# Patient Record
Sex: FEMALE | Race: BLACK OR AFRICAN AMERICAN | NOT HISPANIC OR LATINO | URBAN - METROPOLITAN AREA
[De-identification: names, ages, dates, MRNs, and addresses within clinical notes are randomized per-mention and may not be internally consistent; named-entity substitution may affect disease eponyms.]

---

## 2019-09-26 ENCOUNTER — OFFICE (OUTPATIENT)
Dept: URBAN - METROPOLITAN AREA CLINIC 64 | Facility: CLINIC | Age: 69
End: 2019-09-26

## 2019-09-26 VITALS
HEIGHT: 66 IN | DIASTOLIC BLOOD PRESSURE: 65 MMHG | SYSTOLIC BLOOD PRESSURE: 137 MMHG | HEART RATE: 81 BPM | WEIGHT: 293 LBS

## 2019-09-26 DIAGNOSIS — K59.09 OTHER CONSTIPATION: ICD-10-CM

## 2019-09-26 DIAGNOSIS — K80.20 CALCULUS OF GALLBLADDER WITHOUT CHOLECYSTITIS WITHOUT OBSTRU: ICD-10-CM

## 2019-09-26 DIAGNOSIS — K21.9 GASTRO-ESOPHAGEAL REFLUX DISEASE WITHOUT ESOPHAGITIS: ICD-10-CM

## 2019-09-26 DIAGNOSIS — K75.81 NONALCOHOLIC STEATOHEPATITIS (NASH): ICD-10-CM

## 2019-09-26 DIAGNOSIS — R13.19 OTHER DYSPHAGIA: ICD-10-CM

## 2019-09-26 DIAGNOSIS — R94.5 ABNORMAL RESULTS OF LIVER FUNCTION STUDIES: ICD-10-CM

## 2019-09-26 DIAGNOSIS — R10.13 EPIGASTRIC PAIN: ICD-10-CM

## 2019-09-26 DIAGNOSIS — Z12.11 ENCOUNTER FOR SCREENING FOR MALIGNANT NEOPLASM OF COLON: ICD-10-CM

## 2019-09-26 PROCEDURE — 99243 OFF/OP CNSLTJ NEW/EST LOW 30: CPT | Performed by: INTERNAL MEDICINE

## 2019-09-26 RX ORDER — OMEPRAZOLE 40 MG/1
40 CAPSULE, DELAYED RELEASE ORAL
Qty: 90 | Refills: 0 | Status: ACTIVE
Start: 2019-09-26

## 2019-09-26 RX ORDER — URSODIOL 500 MG/1
TABLET, FILM COATED ORAL
Qty: 60 | Refills: 10 | Status: COMPLETED
Start: 2019-09-26 | End: 2023-02-21 | Stop reason: CLARIF

## 2019-09-26 RX ORDER — LINACLOTIDE 145 UG/1
145 CAPSULE, GELATIN COATED ORAL
Qty: 90 | Refills: 3 | Status: COMPLETED
Start: 2019-09-26 | End: 2023-02-21

## 2019-10-03 ENCOUNTER — ON CAMPUS - OUTPATIENT (OUTPATIENT)
Dept: URBAN - METROPOLITAN AREA HOSPITAL 77 | Facility: HOSPITAL | Age: 69
End: 2019-10-03

## 2019-10-03 DIAGNOSIS — R74.8 ABNORMAL LEVELS OF OTHER SERUM ENZYMES: ICD-10-CM

## 2019-10-03 DIAGNOSIS — K76.89 OTHER SPECIFIED DISEASES OF LIVER: ICD-10-CM

## 2019-10-03 DIAGNOSIS — K76.0 FATTY (CHANGE OF) LIVER, NOT ELSEWHERE CLASSIFIED: ICD-10-CM

## 2019-10-03 PROCEDURE — 43242 EGD US FINE NEEDLE BX/ASPIR: CPT | Performed by: INTERNAL MEDICINE

## 2019-10-25 ENCOUNTER — OFFICE (OUTPATIENT)
Dept: URBAN - METROPOLITAN AREA CLINIC 64 | Facility: CLINIC | Age: 69
End: 2019-10-25

## 2019-10-25 VITALS
DIASTOLIC BLOOD PRESSURE: 72 MMHG | HEIGHT: 66 IN | SYSTOLIC BLOOD PRESSURE: 121 MMHG | WEIGHT: 293 LBS | HEART RATE: 75 BPM

## 2019-10-25 DIAGNOSIS — K59.09 OTHER CONSTIPATION: ICD-10-CM

## 2019-10-25 DIAGNOSIS — K21.9 GASTRO-ESOPHAGEAL REFLUX DISEASE WITHOUT ESOPHAGITIS: ICD-10-CM

## 2019-10-25 DIAGNOSIS — K75.81 NONALCOHOLIC STEATOHEPATITIS (NASH): ICD-10-CM

## 2019-10-25 DIAGNOSIS — R10.13 EPIGASTRIC PAIN: ICD-10-CM

## 2019-10-25 DIAGNOSIS — R13.19 OTHER DYSPHAGIA: ICD-10-CM

## 2019-10-25 DIAGNOSIS — R94.5 ABNORMAL RESULTS OF LIVER FUNCTION STUDIES: ICD-10-CM

## 2019-10-25 DIAGNOSIS — Z46.51 ENCOUNTER FOR FITTING AND ADJUSTMENT OF GASTRIC LAP BAND: ICD-10-CM

## 2019-10-25 DIAGNOSIS — K80.20 CALCULUS OF GALLBLADDER WITHOUT CHOLECYSTITIS WITHOUT OBSTRU: ICD-10-CM

## 2019-10-25 PROCEDURE — 99213 OFFICE O/P EST LOW 20 MIN: CPT | Performed by: INTERNAL MEDICINE

## 2019-12-17 ENCOUNTER — ON CAMPUS - OUTPATIENT (OUTPATIENT)
Dept: URBAN - METROPOLITAN AREA HOSPITAL 77 | Facility: HOSPITAL | Age: 69
End: 2019-12-17
Payer: COMMERCIAL

## 2019-12-17 DIAGNOSIS — Z12.11 ENCOUNTER FOR SCREENING FOR MALIGNANT NEOPLASM OF COLON: ICD-10-CM

## 2019-12-17 DIAGNOSIS — R13.10 DYSPHAGIA, UNSPECIFIED: ICD-10-CM

## 2019-12-17 PROCEDURE — 43450 DILATE ESOPHAGUS 1/MULT PASS: CPT | Performed by: INTERNAL MEDICINE

## 2019-12-17 PROCEDURE — 45378 DIAGNOSTIC COLONOSCOPY: CPT | Mod: 33 | Performed by: INTERNAL MEDICINE

## 2019-12-17 PROCEDURE — 43235 EGD DIAGNOSTIC BRUSH WASH: CPT | Mod: 59 | Performed by: INTERNAL MEDICINE

## 2020-01-28 ENCOUNTER — OFFICE (OUTPATIENT)
Dept: URBAN - METROPOLITAN AREA CLINIC 64 | Facility: CLINIC | Age: 70
End: 2020-01-28

## 2020-01-28 VITALS
HEIGHT: 66 IN | DIASTOLIC BLOOD PRESSURE: 78 MMHG | HEART RATE: 90 BPM | WEIGHT: 293 LBS | SYSTOLIC BLOOD PRESSURE: 125 MMHG

## 2020-01-28 DIAGNOSIS — R13.19 OTHER DYSPHAGIA: ICD-10-CM

## 2020-01-28 DIAGNOSIS — R94.5 ABNORMAL RESULTS OF LIVER FUNCTION STUDIES: ICD-10-CM

## 2020-01-28 DIAGNOSIS — K59.09 OTHER CONSTIPATION: ICD-10-CM

## 2020-01-28 DIAGNOSIS — R10.13 EPIGASTRIC PAIN: ICD-10-CM

## 2020-01-28 DIAGNOSIS — K75.81 NONALCOHOLIC STEATOHEPATITIS (NASH): ICD-10-CM

## 2020-01-28 DIAGNOSIS — K21.9 GASTRO-ESOPHAGEAL REFLUX DISEASE WITHOUT ESOPHAGITIS: ICD-10-CM

## 2020-01-28 PROCEDURE — 99213 OFFICE O/P EST LOW 20 MIN: CPT | Performed by: INTERNAL MEDICINE

## 2020-01-28 RX ORDER — OMEPRAZOLE 40 MG/1
40 CAPSULE, DELAYED RELEASE ORAL
Qty: 90 | Refills: 0 | Status: ACTIVE
Start: 2019-09-26

## 2020-01-28 RX ORDER — LINACLOTIDE 145 UG/1
145 CAPSULE, GELATIN COATED ORAL
Qty: 90 | Refills: 3 | Status: COMPLETED
Start: 2019-09-26 | End: 2023-02-21

## 2020-01-28 RX ORDER — URSODIOL 500 MG/1
TABLET, FILM COATED ORAL
Qty: 60 | Refills: 10 | Status: COMPLETED
Start: 2019-09-26 | End: 2023-02-21 | Stop reason: CLARIF

## 2020-07-30 ENCOUNTER — OUTSIDE FACILITY SERVICE (OUTPATIENT)
Dept: CARDIOLOGY | Facility: CLINIC | Age: 70
End: 2020-07-30

## 2020-07-30 PROCEDURE — 99254 IP/OBS CNSLTJ NEW/EST MOD 60: CPT | Performed by: INTERNAL MEDICINE

## 2020-07-31 ENCOUNTER — OUTSIDE FACILITY SERVICE (OUTPATIENT)
Dept: CARDIOLOGY | Facility: CLINIC | Age: 70
End: 2020-07-31

## 2020-07-31 PROCEDURE — 99232 SBSQ HOSP IP/OBS MODERATE 35: CPT | Performed by: INTERNAL MEDICINE

## 2020-08-01 ENCOUNTER — OUTSIDE FACILITY SERVICE (OUTPATIENT)
Dept: CARDIOLOGY | Facility: CLINIC | Age: 70
End: 2020-08-01

## 2020-08-01 PROCEDURE — 99232 SBSQ HOSP IP/OBS MODERATE 35: CPT | Performed by: INTERNAL MEDICINE

## 2021-11-01 ENCOUNTER — APPOINTMENT (OUTPATIENT)
Dept: CT IMAGING | Facility: HOSPITAL | Age: 71
End: 2021-11-01

## 2021-11-01 ENCOUNTER — APPOINTMENT (OUTPATIENT)
Dept: GENERAL RADIOLOGY | Facility: HOSPITAL | Age: 71
End: 2021-11-01

## 2021-11-01 ENCOUNTER — HOSPITAL ENCOUNTER (INPATIENT)
Facility: HOSPITAL | Age: 71
LOS: 9 days | Discharge: NURSING, REHAB OR LONG-TERM CARE | End: 2021-11-11
Attending: EMERGENCY MEDICINE | Admitting: INTERNAL MEDICINE

## 2021-11-01 DIAGNOSIS — M16.12 PRIMARY OSTEOARTHRITIS OF LEFT HIP: ICD-10-CM

## 2021-11-01 DIAGNOSIS — R55 SYNCOPE, UNSPECIFIED SYNCOPE TYPE: Primary | ICD-10-CM

## 2021-11-01 DIAGNOSIS — I26.99 BILATERAL PULMONARY EMBOLISM (HCC): ICD-10-CM

## 2021-11-01 DIAGNOSIS — J96.01 ACUTE RESPIRATORY FAILURE WITH HYPOXIA (HCC): ICD-10-CM

## 2021-11-01 DIAGNOSIS — R77.8 ELEVATED TROPONIN: ICD-10-CM

## 2021-11-01 LAB
ALBUMIN SERPL-MCNC: 4.1 G/DL (ref 3.5–5.2)
ALBUMIN/GLOB SERPL: 1.1 G/DL
ALP SERPL-CCNC: 101 U/L (ref 39–117)
ALT SERPL W P-5'-P-CCNC: 37 U/L (ref 1–33)
ANION GAP SERPL CALCULATED.3IONS-SCNC: 14.9 MMOL/L (ref 5–15)
AST SERPL-CCNC: 57 U/L (ref 1–32)
B PARAPERT DNA SPEC QL NAA+PROBE: NOT DETECTED
B PERT DNA SPEC QL NAA+PROBE: NOT DETECTED
BASOPHILS # BLD AUTO: 0.1 10*3/MM3 (ref 0–0.2)
BASOPHILS NFR BLD AUTO: 0.8 % (ref 0–1.5)
BILIRUB SERPL-MCNC: 0.4 MG/DL (ref 0–1.2)
BUN SERPL-MCNC: 31 MG/DL (ref 8–23)
BUN/CREAT SERPL: 19.3 (ref 7–25)
C PNEUM DNA NPH QL NAA+NON-PROBE: NOT DETECTED
CALCIUM SPEC-SCNC: 9.4 MG/DL (ref 8.6–10.5)
CHLORIDE SERPL-SCNC: 97 MMOL/L (ref 98–107)
CO2 SERPL-SCNC: 22.1 MMOL/L (ref 22–29)
CREAT SERPL-MCNC: 1.61 MG/DL (ref 0.57–1)
DEPRECATED RDW RBC AUTO: 39.4 FL (ref 37–54)
EOSINOPHIL # BLD AUTO: 0.39 10*3/MM3 (ref 0–0.4)
EOSINOPHIL NFR BLD AUTO: 3 % (ref 0.3–6.2)
ERYTHROCYTE [DISTWIDTH] IN BLOOD BY AUTOMATED COUNT: 12.4 % (ref 12.3–15.4)
FLUAV SUBTYP SPEC NAA+PROBE: NOT DETECTED
FLUBV RNA ISLT QL NAA+PROBE: NOT DETECTED
GFR SERPL CREATININE-BSD FRML MDRD: 38 ML/MIN/1.73
GLOBULIN UR ELPH-MCNC: 3.9 GM/DL
GLUCOSE BLDC GLUCOMTR-MCNC: 316 MG/DL (ref 70–130)
GLUCOSE SERPL-MCNC: 451 MG/DL (ref 65–99)
HADV DNA SPEC NAA+PROBE: NOT DETECTED
HCOV 229E RNA SPEC QL NAA+PROBE: NOT DETECTED
HCOV HKU1 RNA SPEC QL NAA+PROBE: NOT DETECTED
HCOV NL63 RNA SPEC QL NAA+PROBE: NOT DETECTED
HCOV OC43 RNA SPEC QL NAA+PROBE: NOT DETECTED
HCT VFR BLD AUTO: 39 % (ref 34–46.6)
HGB BLD-MCNC: 12.5 G/DL (ref 12–15.9)
HMPV RNA NPH QL NAA+NON-PROBE: NOT DETECTED
HPIV1 RNA SPEC QL NAA+PROBE: NOT DETECTED
HPIV2 RNA SPEC QL NAA+PROBE: NOT DETECTED
HPIV3 RNA NPH QL NAA+PROBE: NOT DETECTED
HPIV4 P GENE NPH QL NAA+PROBE: NOT DETECTED
LYMPHOCYTES # BLD AUTO: 2.34 10*3/MM3 (ref 0.7–3.1)
LYMPHOCYTES NFR BLD AUTO: 18.1 % (ref 19.6–45.3)
M PNEUMO IGG SER IA-ACNC: NOT DETECTED
MCH RBC QN AUTO: 28.3 PG (ref 26.6–33)
MCHC RBC AUTO-ENTMCNC: 32.1 G/DL (ref 31.5–35.7)
MCV RBC AUTO: 88.2 FL (ref 79–97)
MONOCYTES # BLD AUTO: 0.88 10*3/MM3 (ref 0.1–0.9)
MONOCYTES NFR BLD AUTO: 6.8 % (ref 5–12)
NEUTROPHILS NFR BLD AUTO: 70.8 % (ref 42.7–76)
NEUTROPHILS NFR BLD AUTO: 9.14 10*3/MM3 (ref 1.7–7)
NT-PROBNP SERPL-MCNC: 99.8 PG/ML (ref 0–900)
PLATELET # BLD AUTO: 225 10*3/MM3 (ref 140–450)
PMV BLD AUTO: 10.8 FL (ref 6–12)
POTASSIUM SERPL-SCNC: 4.7 MMOL/L (ref 3.5–5.2)
PROT SERPL-MCNC: 8 G/DL (ref 6–8.5)
QT INTERVAL: 353 MS
RBC # BLD AUTO: 4.42 10*6/MM3 (ref 3.77–5.28)
RHINOVIRUS RNA SPEC NAA+PROBE: NOT DETECTED
RSV RNA NPH QL NAA+NON-PROBE: NOT DETECTED
SARS-COV-2 RNA NPH QL NAA+NON-PROBE: NOT DETECTED
SODIUM SERPL-SCNC: 134 MMOL/L (ref 136–145)
TROPONIN T SERPL-MCNC: 0.03 NG/ML (ref 0–0.03)
TROPONIN T SERPL-MCNC: 0.18 NG/ML (ref 0–0.03)
WBC # BLD AUTO: 12.92 10*3/MM3 (ref 3.4–10.8)

## 2021-11-01 PROCEDURE — 70450 CT HEAD/BRAIN W/O DYE: CPT

## 2021-11-01 PROCEDURE — G0378 HOSPITAL OBSERVATION PER HR: HCPCS

## 2021-11-01 PROCEDURE — 83880 ASSAY OF NATRIURETIC PEPTIDE: CPT | Performed by: EMERGENCY MEDICINE

## 2021-11-01 PROCEDURE — 82962 GLUCOSE BLOOD TEST: CPT

## 2021-11-01 PROCEDURE — 84484 ASSAY OF TROPONIN QUANT: CPT | Performed by: EMERGENCY MEDICINE

## 2021-11-01 PROCEDURE — 93010 ELECTROCARDIOGRAM REPORT: CPT | Performed by: INTERNAL MEDICINE

## 2021-11-01 PROCEDURE — 93005 ELECTROCARDIOGRAM TRACING: CPT | Performed by: EMERGENCY MEDICINE

## 2021-11-01 PROCEDURE — 71045 X-RAY EXAM CHEST 1 VIEW: CPT

## 2021-11-01 PROCEDURE — 85025 COMPLETE CBC W/AUTO DIFF WBC: CPT | Performed by: EMERGENCY MEDICINE

## 2021-11-01 PROCEDURE — 80053 COMPREHEN METABOLIC PANEL: CPT | Performed by: EMERGENCY MEDICINE

## 2021-11-01 PROCEDURE — 99284 EMERGENCY DEPT VISIT MOD MDM: CPT

## 2021-11-01 PROCEDURE — 0202U NFCT DS 22 TRGT SARS-COV-2: CPT | Performed by: EMERGENCY MEDICINE

## 2021-11-01 RX ORDER — SODIUM CHLORIDE 0.9 % (FLUSH) 0.9 %
10 SYRINGE (ML) INJECTION AS NEEDED
Status: DISCONTINUED | OUTPATIENT
Start: 2021-11-01 | End: 2021-11-11 | Stop reason: HOSPADM

## 2021-11-01 RX ORDER — ASPIRIN 325 MG
325 TABLET ORAL ONCE
Status: COMPLETED | OUTPATIENT
Start: 2021-11-01 | End: 2021-11-02

## 2021-11-01 NOTE — ED TRIAGE NOTES
Pt states she was at [employer] when she started to feel dizzy and passed out, bystanders state she did not hit head. Upon EMS arrival, pt was found to be at %88 on room air. Now %96 on 6L NC.     Pt was recently at a wedding and states she has also been feeling fatigued for the past 2 days.     Pt and RN wearing mask upon triage.

## 2021-11-01 NOTE — ED PROVIDER NOTES
EMERGENCY DEPARTMENT ENCOUNTER    Room Number:  37/37  Date of encounter:  11/1/2021  PCP: Jani Sun MD  Patient Care Team:  Jani Sun MD as PCP - General (Family Medicine)   Historian: Patient, EMS    HPI:  Chief Complaint: Syncope, hypoxia  A complete HPI/ROS/PMH/PSH/SH/FH are unobtainable due to: Nothing    Context: Grace Renee is a 71 y.o. female who presents to the ED c/o having a syncopal episode today.  She was at [facility] and states that she got up to walk and felt short of breath and lightheaded.  She reports she passed out.  EMS found her with sats of 88% on room air.  She does not use oxygen at home.  She states since Saturday she has been having episodes of shortness of breath with exertion.  She states she went to a wedding ceremony on Saturday and had to sit intermittently while walking to catch her breath.  She states this is abnormal for her.  She has no chest pain or any other pain.  Her oxygen improved with nasal cannula.  Exertion always seems to make it worse.  Rest seems to make it better.  She has never had a syncopal episode in the past.  She states she had her second Covid vaccine on October 5.    Prior record review: No prior records in our system    PAST MEDICAL HISTORY  Active Ambulatory Problems     Diagnosis Date Noted   • No Active Ambulatory Problems     Resolved Ambulatory Problems     Diagnosis Date Noted   • No Resolved Ambulatory Problems     No Additional Past Medical History       The patient has started, but not completed, their COVID-19 vaccination series.    PAST SURGICAL HISTORY  No past surgical history on file.      FAMILY HISTORY  No family history on file.      SOCIAL HISTORY  Social History     Socioeconomic History   • Marital status:          ALLERGIES  Patient has no known allergies.        REVIEW OF SYSTEMS  Review of Systems   Negative fever, negative chills, positive cough, positive sneezing, negative chest pain, positive shortness of  breath, negative abdominal pain, negative nausea, negative vomiting  All systems reviewed and negative except for those discussed in HPI.       PHYSICAL EXAM    I have reviewed the triage vital signs and nursing notes.    ED Triage Vitals [11/01/21 1835]   Temp Heart Rate Resp BP SpO2   97.8 °F (36.6 °C) 101 24 173/81 96 %      Temp src Heart Rate Source Patient Position BP Location FiO2 (%)   -- -- -- -- --       Physical Exam  GENERAL: Awake, alert, no acute distress  SKIN: Warm, dry  HENT: Normocephalic, atraumatic  EYES: no scleral icterus  CV: regular rhythm, regular rate  RESPIRATORY: normal effort, lungs clear  ABDOMEN: soft, nontender, nondistended  MUSCULOSKELETAL: no deformity  NEURO: alert, moves all extremities, follows commands          LAB RESULTS  Recent Results (from the past 24 hour(s))   ECG 12 Lead    Collection Time: 11/01/21  7:08 PM   Result Value Ref Range    QT Interval 353 ms   Comprehensive Metabolic Panel    Collection Time: 11/01/21  7:14 PM    Specimen: Blood   Result Value Ref Range    Glucose 451 (C) 65 - 99 mg/dL    BUN 31 (H) 8 - 23 mg/dL    Creatinine 1.61 (H) 0.57 - 1.00 mg/dL    Sodium 134 (L) 136 - 145 mmol/L    Potassium 4.7 3.5 - 5.2 mmol/L    Chloride 97 (L) 98 - 107 mmol/L    CO2 22.1 22.0 - 29.0 mmol/L    Calcium 9.4 8.6 - 10.5 mg/dL    Total Protein 8.0 6.0 - 8.5 g/dL    Albumin 4.10 3.50 - 5.20 g/dL    ALT (SGPT) 37 (H) 1 - 33 U/L    AST (SGOT) 57 (H) 1 - 32 U/L    Alkaline Phosphatase 101 39 - 117 U/L    Total Bilirubin 0.4 0.0 - 1.2 mg/dL    eGFR  African Amer 38 (L) >60 mL/min/1.73    Globulin 3.9 gm/dL    A/G Ratio 1.1 g/dL    BUN/Creatinine Ratio 19.3 7.0 - 25.0    Anion Gap 14.9 5.0 - 15.0 mmol/L   Troponin    Collection Time: 11/01/21  7:14 PM    Specimen: Blood   Result Value Ref Range    Troponin T 0.033 (C) 0.000 - 0.030 ng/mL   BNP    Collection Time: 11/01/21  7:14 PM    Specimen: Blood   Result Value Ref Range    proBNP 99.8 0.0 - 900.0 pg/mL   CBC Auto  Differential    Collection Time: 11/01/21  7:14 PM    Specimen: Blood   Result Value Ref Range    WBC 12.92 (H) 3.40 - 10.80 10*3/mm3    RBC 4.42 3.77 - 5.28 10*6/mm3    Hemoglobin 12.5 12.0 - 15.9 g/dL    Hematocrit 39.0 34.0 - 46.6 %    MCV 88.2 79.0 - 97.0 fL    MCH 28.3 26.6 - 33.0 pg    MCHC 32.1 31.5 - 35.7 g/dL    RDW 12.4 12.3 - 15.4 %    RDW-SD 39.4 37.0 - 54.0 fl    MPV 10.8 6.0 - 12.0 fL    Platelets 225 140 - 450 10*3/mm3    Neutrophil % 70.8 42.7 - 76.0 %    Lymphocyte % 18.1 (L) 19.6 - 45.3 %    Monocyte % 6.8 5.0 - 12.0 %    Eosinophil % 3.0 0.3 - 6.2 %    Basophil % 0.8 0.0 - 1.5 %    Neutrophils, Absolute 9.14 (H) 1.70 - 7.00 10*3/mm3    Lymphocytes, Absolute 2.34 0.70 - 3.10 10*3/mm3    Monocytes, Absolute 0.88 0.10 - 0.90 10*3/mm3    Eosinophils, Absolute 0.39 0.00 - 0.40 10*3/mm3    Basophils, Absolute 0.10 0.00 - 0.20 10*3/mm3   Respiratory Panel PCR w/COVID-19(SARS-CoV-2) KENDALL/JOHN/HANNAH/PAD/COR/MAD/ZEKE In-House, NP Swab in [facility], 3-4 HR TAT - Swab, Nasopharynx    Collection Time: 11/01/21  7:54 PM    Specimen: Nasopharynx; Swab   Result Value Ref Range    ADENOVIRUS, PCR Not Detected Not Detected    Coronavirus 229E Not Detected Not Detected    Coronavirus HKU1 Not Detected Not Detected    Coronavirus NL63 Not Detected Not Detected    Coronavirus OC43 Not Detected Not Detected    COVID19 Not Detected Not Detected - Ref. Range    Human Metapneumovirus Not Detected Not Detected    Human Rhinovirus/Enterovirus Not Detected Not Detected    Influenza A PCR Not Detected Not Detected    Influenza B PCR Not Detected Not Detected    Parainfluenza Virus 1 Not Detected Not Detected    Parainfluenza Virus 2 Not Detected Not Detected    Parainfluenza Virus 3 Not Detected Not Detected    Parainfluenza Virus 4 Not Detected Not Detected    RSV, PCR Not Detected Not Detected    Bordetella pertussis pcr Not Detected Not Detected    Bordetella parapertussis PCR Not Detected Not Detected    Chlamydophila pneumoniae  PCR Not Detected Not Detected    Mycoplasma pneumo by PCR Not Detected Not Detected   Troponin    Collection Time: 11/01/21  9:48 PM    Specimen: Blood   Result Value Ref Range    Troponin T 0.185 (C) 0.000 - 0.030 ng/mL   POC Glucose Once    Collection Time: 11/01/21  9:59 PM    Specimen: Blood   Result Value Ref Range    Glucose 316 (H) 70 - 130 mg/dL       Ordered the above labs and independently reviewed the results.        RADIOLOGY  CT Head Without Contrast    Result Date: 11/1/2021  CT HEAD WITHOUT CONTRAST  HISTORY: Syncope  COMPARISON: None available.  TECHNIQUE: Axial CT imaging was obtained through the brain. No IV contrast was administered.  FINDINGS: No acute intracranial hemorrhage is seen. Ventricles are normal in size. There is no midline shift or mass effect. No calvarial fracture is seen. There is mucosal thickening noted within the left maxillary sinus. There is also a mucous retention cyst. Mastoid air cells appear clear.      No acute intracranial findings.  Radiation dose reduction techniques were utilized, including automated exposure control and exposure modulation based on body size.  This report was finalized on 11/1/2021 10:00 PM by Dr. Josie Malik M.D.      XR Chest 1 View    Result Date: 11/1/2021  PORTABLE CHEST  HISTORY: Cough.  FINDINGS: A single view of the chest was obtained. The study is hampered by the patient's body habitus. The heart is at the upper limits of normal in size. There is no evidence of infiltrate, effusion or of congestive failure.        I ordered the above noted radiological studies. Reviewed by me and discussed with radiologist.  See dictation for official radiology interpretation.      PROCEDURES    Critical Care  Performed by: Canelo Muniz MD  Authorized by: Canelo Muniz MD     Critical care provider statement:     Critical care time (minutes): between 30&74 minutes.          MEDICATIONS GIVEN IN ER    Medications   sodium chloride 0.9 % flush 10  mL (has no administration in time range)   enoxaparin (LOVENOX) syringe 130 mg (has no administration in time range)   aspirin tablet 325 mg (has no administration in time range)         PROGRESS, DATA ANALYSIS, CONSULTS, AND MEDICAL DECISION MAKING    All labs have been independently reviewed by me.  All radiology studies have been reviewed by me and discussed with radiologist dictating the report.   EKG's independently viewed and interpreted by me.  Discussion below represents my analysis of pertinent findings related to patient's condition, differential diagnosis, treatment plan and final disposition.    Differential diagnosis includes but is not limited to pneumonia, intracranial hemorrhage, PE, viral pneumonia, Covid, dehydration, arrhythmia, STEMI, non-STEMI.    ED Course as of 11/01/21 2310 Mon Nov 01, 2021 1924 EKG          EKG time: 1908  Rhythm/Rate: Sinus tachycardia, rate 104  P waves and NM: Normal P, normal NM  QRS, axis: Narrow QRS, normal axis  ST and T waves: Normal ST and T waves    Interpreted Contemporaneously by me, independently viewed  No prior EKG available for comparison   [TR]   2001 Troponin T(!!): 0.033 [TR]   2001 Glucose(!!): 451 [TR]   2001 Creatinine(!): 1.61 [TR]   2001 She reports she took her insulin just prior to having her syncopal episode.  Plan to monitor her blood sugar rather than acutely intervening. [TR]   2015 Renal function does not support getting IV contrasted CT scan of her chest.  Her troponin is elevated.  Plan admission for further work-up and Lovenox treatment until VQ scan can be performed tomorrow. [TR]   2207 I reviewed work-up and findings with the patient.  Plan admission for cardiac evaluation given syncope and elevated troponin, hypoxia, to rule out PE.  She is agreeable. [TR]   2245 Speaking with Dr. Tang with cardiology. [TR]   2307 Speaking with Mayelin with A.  Will admit to Dr. Mcnulty. [TR]      ED Course User Index  [TR] Canelo Muniz MD            PPE: The patient wore a mask and I wore a N95 mask throughout the entire patient encounter.  I wore a gown.      AS OF 23:10 EDT VITALS:    BP - 173/81  HR - 103  TEMP - 97.8 °F (36.6 °C)  O2 SATS - 96%        DIAGNOSIS  Final diagnoses:   Syncope, unspecified syncope type   Elevated troponin         DISPOSITION  ED Disposition     ED Disposition Condition Comment    Decision to Admit  Level of Care: Telemetry [5]   Diagnosis: Syncope, unspecified syncope type [[id number]]   Admitting Physician: JEAN DOLAN [[id number]]   Attending Physician: JEAN DOLAN [[id number]]                  Canelo Muniz MD  11/01/21 1911

## 2021-11-02 ENCOUNTER — APPOINTMENT (OUTPATIENT)
Dept: CT IMAGING | Facility: HOSPITAL | Age: 71
End: 2021-11-02

## 2021-11-02 ENCOUNTER — APPOINTMENT (OUTPATIENT)
Dept: CARDIOLOGY | Facility: HOSPITAL | Age: 71
End: 2021-11-02

## 2021-11-02 PROBLEM — G56.03 CARPAL TUNNEL SYNDROME, BILATERAL: Status: ACTIVE | Noted: 2021-11-02

## 2021-11-02 PROBLEM — M25.552 LEFT HIP PAIN: Status: ACTIVE | Noted: 2021-11-02

## 2021-11-02 PROBLEM — I26.99 BILATERAL PULMONARY EMBOLISM (HCC): Status: ACTIVE | Noted: 2021-11-02

## 2021-11-02 LAB
ACT BLD: 235 SECONDS (ref 82–152)
ACT BLD: 246 SECONDS (ref 82–152)
ACT BLD: 285 SECONDS (ref 82–152)
ALBUMIN SERPL-MCNC: 3.9 G/DL (ref 3.5–5.2)
ALBUMIN/GLOB SERPL: 1.1 G/DL
ALP SERPL-CCNC: 97 U/L (ref 39–117)
ALT SERPL W P-5'-P-CCNC: 33 U/L (ref 1–33)
ANION GAP SERPL CALCULATED.3IONS-SCNC: 11.5 MMOL/L (ref 5–15)
AORTIC DIMENSIONLESS INDEX: 0.7 (DI)
APTT PPP: >200 SECONDS (ref 22.7–35.4)
AST SERPL-CCNC: 41 U/L (ref 1–32)
BASOPHILS # BLD AUTO: 0.1 10*3/MM3 (ref 0–0.2)
BASOPHILS NFR BLD AUTO: 0.9 % (ref 0–1.5)
BH CV ECHO MEAS - ACS: 1.7 CM
BH CV ECHO MEAS - AO MAX PG (FULL): 6 MMHG
BH CV ECHO MEAS - AO MAX PG: 15.5 MMHG
BH CV ECHO MEAS - AO MEAN PG (FULL): 3 MMHG
BH CV ECHO MEAS - AO MEAN PG: 7 MMHG
BH CV ECHO MEAS - AO V2 MAX: 196.7 CM/SEC
BH CV ECHO MEAS - AO V2 MEAN: 122.8 CM/SEC
BH CV ECHO MEAS - AO V2 VTI: 37.9 CM
BH CV ECHO MEAS - AVA(I,A): 2 CM^2
BH CV ECHO MEAS - AVA(I,D): 2 CM^2
BH CV ECHO MEAS - AVA(V,A): 2.3 CM^2
BH CV ECHO MEAS - AVA(V,D): 2.3 CM^2
BH CV ECHO MEAS - BSA(HAYCOCK): 2.5 M^2
BH CV ECHO MEAS - BSA: 2.3 M^2
BH CV ECHO MEAS - BZI_BMI: 43.9 KILOGRAMS/M^2
BH CV ECHO MEAS - BZI_METRIC_HEIGHT: 170.2 CM
BH CV ECHO MEAS - BZI_METRIC_WEIGHT: 127 KG
BH CV ECHO MEAS - CONTRAST EF 4CH: 85 CM2
BH CV ECHO MEAS - EDV(CUBED): 30 ML
BH CV ECHO MEAS - EDV(MOD-SP2): 64 ML
BH CV ECHO MEAS - EDV(MOD-SP4): 48 ML
BH CV ECHO MEAS - EDV(TEICH): 38.2 ML
BH CV ECHO MEAS - EF(CUBED): 58.4 %
BH CV ECHO MEAS - EF(MOD-BP): 84.7 %
BH CV ECHO MEAS - EF(MOD-SP2): 89.1 %
BH CV ECHO MEAS - EF(MOD-SP4): 79.2 %
BH CV ECHO MEAS - EF(TEICH): 51.5 %
BH CV ECHO MEAS - ESV(CUBED): 12.5 ML
BH CV ECHO MEAS - ESV(MOD-SP2): 7 ML
BH CV ECHO MEAS - ESV(MOD-SP4): 10 ML
BH CV ECHO MEAS - ESV(TEICH): 18.5 ML
BH CV ECHO MEAS - FS: 25.4 %
BH CV ECHO MEAS - IVS/LVPW: 1.1
BH CV ECHO MEAS - IVSD: 1.5 CM
BH CV ECHO MEAS - LAT PEAK E' VEL: 10 CM/SEC
BH CV ECHO MEAS - LV DIASTOLIC VOL/BSA (35-75): 20.6 ML/M^2
BH CV ECHO MEAS - LV MASS(C)D: 149.8 GRAMS
BH CV ECHO MEAS - LV MASS(C)DI: 64.2 GRAMS/M^2
BH CV ECHO MEAS - LV MAX PG: 9.5 MMHG
BH CV ECHO MEAS - LV MEAN PG: 4 MMHG
BH CV ECHO MEAS - LV SYSTOLIC VOL/BSA (12-30): 4.3 ML/M^2
BH CV ECHO MEAS - LV V1 MAX: 154.2 CM/SEC
BH CV ECHO MEAS - LV V1 MEAN: 91 CM/SEC
BH CV ECHO MEAS - LV V1 VTI: 26.2 CM
BH CV ECHO MEAS - LVIDD: 3.1 CM
BH CV ECHO MEAS - LVIDS: 2.3 CM
BH CV ECHO MEAS - LVLD AP2: 6.7 CM
BH CV ECHO MEAS - LVLD AP4: 6.7 CM
BH CV ECHO MEAS - LVLS AP2: 4.8 CM
BH CV ECHO MEAS - LVLS AP4: 5 CM
BH CV ECHO MEAS - LVOT AREA (M): 2.8 CM^2
BH CV ECHO MEAS - LVOT AREA: 2.9 CM^2
BH CV ECHO MEAS - LVOT DIAM: 1.9 CM
BH CV ECHO MEAS - LVPWD: 1.3 CM
BH CV ECHO MEAS - MED PEAK E' VEL: 5.4 CM/SEC
BH CV ECHO MEAS - MV A DUR: 0.13 SEC
BH CV ECHO MEAS - MV A MAX VEL: 111 CM/SEC
BH CV ECHO MEAS - MV DEC SLOPE: 343.2 CM/SEC^2
BH CV ECHO MEAS - MV DEC TIME: 170 SEC
BH CV ECHO MEAS - MV E MAX VEL: 72.8 CM/SEC
BH CV ECHO MEAS - MV E/A: 0.66
BH CV ECHO MEAS - MV MAX PG: 6.3 MMHG
BH CV ECHO MEAS - MV MEAN PG: 2.8 MMHG
BH CV ECHO MEAS - MV P1/2T MAX VEL: 86.1 CM/SEC
BH CV ECHO MEAS - MV P1/2T: 73.5 MSEC
BH CV ECHO MEAS - MV V2 MAX: 125.7 CM/SEC
BH CV ECHO MEAS - MV V2 MEAN: 79.5 CM/SEC
BH CV ECHO MEAS - MV V2 VTI: 24.2 CM
BH CV ECHO MEAS - MVA P1/2T LCG: 2.6 CM^2
BH CV ECHO MEAS - MVA(P1/2T): 3 CM^2
BH CV ECHO MEAS - MVA(VTI): 3.1 CM^2
BH CV ECHO MEAS - PULM A REVS DUR: 0.17 SEC
BH CV ECHO MEAS - PULM A REVS VEL: 30.5 CM/SEC
BH CV ECHO MEAS - PULM DIAS VEL: 28.8 CM/SEC
BH CV ECHO MEAS - PULM S/D: 1.6
BH CV ECHO MEAS - PULM SYS VEL: 46.2 CM/SEC
BH CV ECHO MEAS - RAP SYSTOLE: 8 MMHG
BH CV ECHO MEAS - RVSP: 31.3 MMHG
BH CV ECHO MEAS - SI(CUBED): 7.5 ML/M^2
BH CV ECHO MEAS - SI(LVOT): 32.6 ML/M^2
BH CV ECHO MEAS - SI(MOD-SP2): 24.4 ML/M^2
BH CV ECHO MEAS - SI(MOD-SP4): 16.3 ML/M^2
BH CV ECHO MEAS - SI(TEICH): 8.4 ML/M^2
BH CV ECHO MEAS - SV(CUBED): 17.6 ML
BH CV ECHO MEAS - SV(LVOT): 76 ML
BH CV ECHO MEAS - SV(MOD-SP2): 57 ML
BH CV ECHO MEAS - SV(MOD-SP4): 38 ML
BH CV ECHO MEAS - SV(TEICH): 19.7 ML
BH CV ECHO MEAS - TAPSE (>1.6): 1 CM
BH CV ECHO MEAS - TR MAX VEL: 241.3 CM/SEC
BH CV ECHO MEASUREMENTS AVERAGE E/E' RATIO: 9.45
BH CV VAS BP RIGHT ARM: NORMAL MMHG
BH CV XLRA - RV BASE: 3 CM
BH CV XLRA - RV LENGTH: 6 CM
BH CV XLRA - RV MID: 2 CM
BH CV XLRA - TDI S': 7.7 CM/SEC
BILIRUB SERPL-MCNC: 0.3 MG/DL (ref 0–1.2)
BUN SERPL-MCNC: 27 MG/DL (ref 8–23)
BUN/CREAT SERPL: 19.6 (ref 7–25)
CALCIUM SPEC-SCNC: 9.6 MG/DL (ref 8.6–10.5)
CHLORIDE SERPL-SCNC: 102 MMOL/L (ref 98–107)
CO2 SERPL-SCNC: 27.5 MMOL/L (ref 22–29)
CREAT SERPL-MCNC: 1.38 MG/DL (ref 0.57–1)
D DIMER PPP FEU-MCNC: 8.36 MCGFEU/ML (ref 0–0.49)
DEPRECATED RDW RBC AUTO: 38.4 FL (ref 37–54)
DEPRECATED RDW RBC AUTO: 40.4 FL (ref 37–54)
EOSINOPHIL # BLD AUTO: 0.22 10*3/MM3 (ref 0–0.4)
EOSINOPHIL NFR BLD AUTO: 2 % (ref 0.3–6.2)
ERYTHROCYTE [DISTWIDTH] IN BLOOD BY AUTOMATED COUNT: 12.6 % (ref 12.3–15.4)
ERYTHROCYTE [DISTWIDTH] IN BLOOD BY AUTOMATED COUNT: 12.8 % (ref 12.3–15.4)
GFR SERPL CREATININE-BSD FRML MDRD: 46 ML/MIN/1.73
GLOBULIN UR ELPH-MCNC: 3.4 GM/DL
GLUCOSE BLDC GLUCOMTR-MCNC: 132 MG/DL (ref 70–130)
GLUCOSE BLDC GLUCOMTR-MCNC: 208 MG/DL (ref 70–130)
GLUCOSE BLDC GLUCOMTR-MCNC: 236 MG/DL (ref 70–130)
GLUCOSE BLDC GLUCOMTR-MCNC: 248 MG/DL (ref 70–130)
GLUCOSE BLDC GLUCOMTR-MCNC: 307 MG/DL (ref 70–130)
GLUCOSE BLDC GLUCOMTR-MCNC: 372 MG/DL (ref 70–130)
GLUCOSE SERPL-MCNC: 136 MG/DL (ref 65–99)
HBA1C MFR BLD: 9.27 % (ref 4.8–5.6)
HCT VFR BLD AUTO: 32.4 % (ref 34–46.6)
HCT VFR BLD AUTO: 35.6 % (ref 34–46.6)
HCT VFR BLDA CALC: 34 % (ref 38–51)
HCT VFR BLDA CALC: 34 % (ref 38–51)
HGB BLD-MCNC: 10.4 G/DL (ref 12–15.9)
HGB BLD-MCNC: 11.8 G/DL (ref 12–15.9)
HGB BLDA-MCNC: 11.6 G/DL (ref 12–17)
HGB BLDA-MCNC: 11.6 G/DL (ref 12–17)
IMM GRANULOCYTES # BLD AUTO: 0.04 10*3/MM3 (ref 0–0.05)
IMM GRANULOCYTES NFR BLD AUTO: 0.4 % (ref 0–0.5)
INR PPP: 1.46 (ref 0.9–1.1)
LEFT ATRIUM VOLUME INDEX: 24 ML/M2
LYMPHOCYTES # BLD AUTO: 2.11 10*3/MM3 (ref 0.7–3.1)
LYMPHOCYTES NFR BLD AUTO: 19 % (ref 19.6–45.3)
MAXIMAL PREDICTED HEART RATE: 150 BPM
MCH RBC QN AUTO: 28.2 PG (ref 26.6–33)
MCH RBC QN AUTO: 28.3 PG (ref 26.6–33)
MCHC RBC AUTO-ENTMCNC: 32.1 G/DL (ref 31.5–35.7)
MCHC RBC AUTO-ENTMCNC: 33.1 G/DL (ref 31.5–35.7)
MCV RBC AUTO: 85 FL (ref 79–97)
MCV RBC AUTO: 88 FL (ref 79–97)
MONOCYTES # BLD AUTO: 0.79 10*3/MM3 (ref 0.1–0.9)
MONOCYTES NFR BLD AUTO: 7.1 % (ref 5–12)
NEUTROPHILS NFR BLD AUTO: 7.85 10*3/MM3 (ref 1.7–7)
NEUTROPHILS NFR BLD AUTO: 70.6 % (ref 42.7–76)
NRBC BLD AUTO-RTO: 0.1 /100 WBC (ref 0–0.2)
PLATELET # BLD AUTO: 216 10*3/MM3 (ref 140–450)
PLATELET # BLD AUTO: 253 10*3/MM3 (ref 140–450)
PMV BLD AUTO: 11 FL (ref 6–12)
PMV BLD AUTO: 11.2 FL (ref 6–12)
POTASSIUM SERPL-SCNC: 4.1 MMOL/L (ref 3.5–5.2)
PROT SERPL-MCNC: 7.3 G/DL (ref 6–8.5)
PROTHROMBIN TIME: 17.5 SECONDS (ref 11.7–14.2)
QT INTERVAL: 425 MS
RBC # BLD AUTO: 3.68 10*6/MM3 (ref 3.77–5.28)
RBC # BLD AUTO: 4.19 10*6/MM3 (ref 3.77–5.28)
SAO2 % BLDA: 57 % (ref 95–98)
SAO2 % BLDA: 58 % (ref 95–98)
SODIUM SERPL-SCNC: 141 MMOL/L (ref 136–145)
STRESS TARGET HR: 128 BPM
TROPONIN T SERPL-MCNC: 0.25 NG/ML (ref 0–0.03)
WBC # BLD AUTO: 11.11 10*3/MM3 (ref 3.4–10.8)
WBC # BLD AUTO: 11.86 10*3/MM3 (ref 3.4–10.8)

## 2021-11-02 PROCEDURE — 85025 COMPLETE CBC W/AUTO DIFF WBC: CPT | Performed by: INTERNAL MEDICINE

## 2021-11-02 PROCEDURE — 93005 ELECTROCARDIOGRAM TRACING: CPT | Performed by: INTERNAL MEDICINE

## 2021-11-02 PROCEDURE — 02CR3ZZ EXTIRPATION OF MATTER FROM LEFT PULMONARY ARTERY, PERCUTANEOUS APPROACH: ICD-10-PCS | Performed by: INTERNAL MEDICINE

## 2021-11-02 PROCEDURE — 84484 ASSAY OF TROPONIN QUANT: CPT | Performed by: NURSE PRACTITIONER

## 2021-11-02 PROCEDURE — C1894 INTRO/SHEATH, NON-LASER: HCPCS | Performed by: INTERNAL MEDICINE

## 2021-11-02 PROCEDURE — 85379 FIBRIN DEGRADATION QUANT: CPT | Performed by: NURSE PRACTITIONER

## 2021-11-02 PROCEDURE — 25010000002 MIDAZOLAM PER 1 MG: Performed by: INTERNAL MEDICINE

## 2021-11-02 PROCEDURE — B31T1ZZ FLUOROSCOPY OF LEFT PULMONARY ARTERY USING LOW OSMOLAR CONTRAST: ICD-10-PCS | Performed by: INTERNAL MEDICINE

## 2021-11-02 PROCEDURE — 25010000002 MORPHINE PER 10 MG: Performed by: INTERNAL MEDICINE

## 2021-11-02 PROCEDURE — 99215 OFFICE O/P EST HI 40 MIN: CPT | Performed by: INTERNAL MEDICINE

## 2021-11-02 PROCEDURE — 85014 HEMATOCRIT: CPT

## 2021-11-02 PROCEDURE — C1769 GUIDE WIRE: HCPCS | Performed by: INTERNAL MEDICINE

## 2021-11-02 PROCEDURE — 4A023N6 MEASUREMENT OF CARDIAC SAMPLING AND PRESSURE, RIGHT HEART, PERCUTANEOUS APPROACH: ICD-10-PCS | Performed by: INTERNAL MEDICINE

## 2021-11-02 PROCEDURE — 93010 ELECTROCARDIOGRAM REPORT: CPT | Performed by: INTERNAL MEDICINE

## 2021-11-02 PROCEDURE — 85018 HEMOGLOBIN: CPT

## 2021-11-02 PROCEDURE — 93451 RIGHT HEART CATH: CPT | Performed by: INTERNAL MEDICINE

## 2021-11-02 PROCEDURE — 25010000002 ENOXAPARIN PER 10 MG: Performed by: EMERGENCY MEDICINE

## 2021-11-02 PROCEDURE — 85610 PROTHROMBIN TIME: CPT | Performed by: INTERNAL MEDICINE

## 2021-11-02 PROCEDURE — 80053 COMPREHEN METABOLIC PANEL: CPT | Performed by: NURSE PRACTITIONER

## 2021-11-02 PROCEDURE — 99153 MOD SED SAME PHYS/QHP EA: CPT | Performed by: INTERNAL MEDICINE

## 2021-11-02 PROCEDURE — C1760 CLOSURE DEV, VASC: HCPCS | Performed by: INTERNAL MEDICINE

## 2021-11-02 PROCEDURE — 82962 GLUCOSE BLOOD TEST: CPT

## 2021-11-02 PROCEDURE — 93306 TTE W/DOPPLER COMPLETE: CPT

## 2021-11-02 PROCEDURE — B31S1ZZ FLUOROSCOPY OF RIGHT PULMONARY ARTERY USING LOW OSMOLAR CONTRAST: ICD-10-PCS | Performed by: INTERNAL MEDICINE

## 2021-11-02 PROCEDURE — 37184 PRIM ART M-THRMBC 1ST VSL: CPT | Performed by: INTERNAL MEDICINE

## 2021-11-02 PROCEDURE — 93568 NJX CAR CTH NSLC P-ART ANGRP: CPT | Performed by: INTERNAL MEDICINE

## 2021-11-02 PROCEDURE — 25010000002 FENTANYL CITRATE (PF) 50 MCG/ML SOLUTION: Performed by: INTERNAL MEDICINE

## 2021-11-02 PROCEDURE — 83036 HEMOGLOBIN GLYCOSYLATED A1C: CPT | Performed by: NURSE PRACTITIONER

## 2021-11-02 PROCEDURE — 85347 COAGULATION TIME ACTIVATED: CPT

## 2021-11-02 PROCEDURE — 85730 THROMBOPLASTIN TIME PARTIAL: CPT | Performed by: INTERNAL MEDICINE

## 2021-11-02 PROCEDURE — 25010000002 PERFLUTREN (DEFINITY) 8.476 MG IN SODIUM CHLORIDE (PF) 0.9 % 10 ML INJECTION: Performed by: INTERNAL MEDICINE

## 2021-11-02 PROCEDURE — 85027 COMPLETE CBC AUTOMATED: CPT | Performed by: NURSE PRACTITIONER

## 2021-11-02 PROCEDURE — 02CQ3ZZ EXTIRPATION OF MATTER FROM RIGHT PULMONARY ARTERY, PERCUTANEOUS APPROACH: ICD-10-PCS | Performed by: INTERNAL MEDICINE

## 2021-11-02 PROCEDURE — 0 IOPAMIDOL PER 1 ML: Performed by: INTERNAL MEDICINE

## 2021-11-02 PROCEDURE — 63710000001 INSULIN REGULAR HUMAN PER 5 UNITS: Performed by: INTERNAL MEDICINE

## 2021-11-02 PROCEDURE — 25010000002 DIPHENHYDRAMINE PER 50 MG: Performed by: INTERNAL MEDICINE

## 2021-11-02 PROCEDURE — 25010000002 HEPARIN (PORCINE) PER 1000 UNITS: Performed by: INTERNAL MEDICINE

## 2021-11-02 PROCEDURE — C1757 CATH, THROMBECTOMY/EMBOLECT: HCPCS | Performed by: INTERNAL MEDICINE

## 2021-11-02 PROCEDURE — 71275 CT ANGIOGRAPHY CHEST: CPT

## 2021-11-02 PROCEDURE — 99152 MOD SED SAME PHYS/QHP 5/>YRS: CPT | Performed by: INTERNAL MEDICINE

## 2021-11-02 PROCEDURE — 93306 TTE W/DOPPLER COMPLETE: CPT | Performed by: INTERNAL MEDICINE

## 2021-11-02 PROCEDURE — 63710000001 INSULIN LISPRO (HUMAN) PER 5 UNITS: Performed by: NURSE PRACTITIONER

## 2021-11-02 PROCEDURE — 36415 COLL VENOUS BLD VENIPUNCTURE: CPT | Performed by: NURSE PRACTITIONER

## 2021-11-02 RX ORDER — FENTANYL CITRATE 50 UG/ML
INJECTION, SOLUTION INTRAMUSCULAR; INTRAVENOUS AS NEEDED
Status: DISCONTINUED | OUTPATIENT
Start: 2021-11-02 | End: 2021-11-02 | Stop reason: HOSPADM

## 2021-11-02 RX ORDER — ONDANSETRON 4 MG/1
4 TABLET, FILM COATED ORAL EVERY 6 HOURS PRN
Status: DISCONTINUED | OUTPATIENT
Start: 2021-11-02 | End: 2021-11-11 | Stop reason: HOSPADM

## 2021-11-02 RX ORDER — DEXTROSE MONOHYDRATE 25 G/50ML
25 INJECTION, SOLUTION INTRAVENOUS
Status: DISCONTINUED | OUTPATIENT
Start: 2021-11-02 | End: 2021-11-09 | Stop reason: SDUPTHER

## 2021-11-02 RX ORDER — NICOTINE POLACRILEX 4 MG
15 LOZENGE BUCCAL
Status: DISCONTINUED | OUTPATIENT
Start: 2021-11-02 | End: 2021-11-09 | Stop reason: SDUPTHER

## 2021-11-02 RX ORDER — MORPHINE SULFATE 2 MG/ML
2 INJECTION, SOLUTION INTRAMUSCULAR; INTRAVENOUS EVERY 4 HOURS PRN
Status: DISPENSED | OUTPATIENT
Start: 2021-11-02 | End: 2021-11-09

## 2021-11-02 RX ORDER — NITROGLYCERIN 0.4 MG/1
0.4 TABLET SUBLINGUAL
Status: DISCONTINUED | OUTPATIENT
Start: 2021-11-02 | End: 2021-11-11 | Stop reason: HOSPADM

## 2021-11-02 RX ORDER — DEXTROSE MONOHYDRATE 25 G/50ML
25 INJECTION, SOLUTION INTRAVENOUS
Status: DISCONTINUED | OUTPATIENT
Start: 2021-11-02 | End: 2021-11-11 | Stop reason: HOSPADM

## 2021-11-02 RX ORDER — HEPARIN SODIUM 10000 [USP'U]/100ML
18 INJECTION, SOLUTION INTRAVENOUS
Status: DISCONTINUED | OUTPATIENT
Start: 2021-11-02 | End: 2021-11-11 | Stop reason: HOSPADM

## 2021-11-02 RX ORDER — HEPARIN SODIUM 10000 [USP'U]/100ML
INJECTION, SOLUTION INTRAVENOUS CONTINUOUS PRN
Status: COMPLETED | OUTPATIENT
Start: 2021-11-02 | End: 2021-11-02

## 2021-11-02 RX ORDER — SODIUM CHLORIDE 9 MG/ML
125 INJECTION, SOLUTION INTRAVENOUS CONTINUOUS
Status: DISCONTINUED | OUTPATIENT
Start: 2021-11-02 | End: 2021-11-02

## 2021-11-02 RX ORDER — SODIUM CHLORIDE 0.9 % (FLUSH) 0.9 %
10 SYRINGE (ML) INJECTION EVERY 12 HOURS SCHEDULED
Status: DISCONTINUED | OUTPATIENT
Start: 2021-11-02 | End: 2021-11-11 | Stop reason: HOSPADM

## 2021-11-02 RX ORDER — HEPARIN SODIUM 1000 [USP'U]/ML
INJECTION, SOLUTION INTRAVENOUS; SUBCUTANEOUS AS NEEDED
Status: DISCONTINUED | OUTPATIENT
Start: 2021-11-02 | End: 2021-11-02 | Stop reason: HOSPADM

## 2021-11-02 RX ORDER — HEPARIN SODIUM 5000 [USP'U]/ML
40-80 INJECTION, SOLUTION INTRAVENOUS; SUBCUTANEOUS EVERY 6 HOURS PRN
Status: DISCONTINUED | OUTPATIENT
Start: 2021-11-02 | End: 2021-11-11 | Stop reason: HOSPADM

## 2021-11-02 RX ORDER — SODIUM CHLORIDE 0.9 % (FLUSH) 0.9 %
10 SYRINGE (ML) INJECTION AS NEEDED
Status: DISCONTINUED | OUTPATIENT
Start: 2021-11-02 | End: 2021-11-11 | Stop reason: HOSPADM

## 2021-11-02 RX ORDER — NICOTINE POLACRILEX 4 MG
15 LOZENGE BUCCAL
Status: DISCONTINUED | OUTPATIENT
Start: 2021-11-02 | End: 2021-11-11 | Stop reason: HOSPADM

## 2021-11-02 RX ORDER — LIDOCAINE HYDROCHLORIDE 20 MG/ML
INJECTION, SOLUTION INFILTRATION; PERINEURAL AS NEEDED
Status: DISCONTINUED | OUTPATIENT
Start: 2021-11-02 | End: 2021-11-02 | Stop reason: HOSPADM

## 2021-11-02 RX ORDER — MIDAZOLAM HYDROCHLORIDE 1 MG/ML
INJECTION INTRAMUSCULAR; INTRAVENOUS AS NEEDED
Status: DISCONTINUED | OUTPATIENT
Start: 2021-11-02 | End: 2021-11-02 | Stop reason: HOSPADM

## 2021-11-02 RX ORDER — DIPHENHYDRAMINE HYDROCHLORIDE 50 MG/ML
INJECTION INTRAMUSCULAR; INTRAVENOUS AS NEEDED
Status: DISCONTINUED | OUTPATIENT
Start: 2021-11-02 | End: 2021-11-02 | Stop reason: HOSPADM

## 2021-11-02 RX ORDER — ONDANSETRON 2 MG/ML
4 INJECTION INTRAMUSCULAR; INTRAVENOUS EVERY 6 HOURS PRN
Status: DISCONTINUED | OUTPATIENT
Start: 2021-11-02 | End: 2021-11-11 | Stop reason: HOSPADM

## 2021-11-02 RX ORDER — HYDROCODONE BITARTRATE AND ACETAMINOPHEN 5; 325 MG/1; MG/1
1 TABLET ORAL ONCE AS NEEDED
Status: COMPLETED | OUTPATIENT
Start: 2021-11-02 | End: 2021-11-02

## 2021-11-02 RX ORDER — INSULIN LISPRO 100 [IU]/ML
0-9 INJECTION, SOLUTION INTRAVENOUS; SUBCUTANEOUS
Status: DISCONTINUED | OUTPATIENT
Start: 2021-11-02 | End: 2021-11-02

## 2021-11-02 RX ORDER — ACETAMINOPHEN 325 MG/1
650 TABLET ORAL EVERY 4 HOURS PRN
Status: DISCONTINUED | OUTPATIENT
Start: 2021-11-02 | End: 2021-11-11 | Stop reason: HOSPADM

## 2021-11-02 RX ADMIN — SODIUM CHLORIDE 500 ML: 9 INJECTION, SOLUTION INTRAVENOUS at 11:36

## 2021-11-02 RX ADMIN — SODIUM CHLORIDE, PRESERVATIVE FREE 10 ML: 5 INJECTION INTRAVENOUS at 21:41

## 2021-11-02 RX ADMIN — IOPAMIDOL 95 ML: 755 INJECTION, SOLUTION INTRAVENOUS at 08:59

## 2021-11-02 RX ADMIN — INSULIN LISPRO 4 UNITS: 100 INJECTION, SOLUTION INTRAVENOUS; SUBCUTANEOUS at 12:18

## 2021-11-02 RX ADMIN — SODIUM CHLORIDE 125 ML/HR: 9 INJECTION, SOLUTION INTRAVENOUS at 11:40

## 2021-11-02 RX ADMIN — ENOXAPARIN SODIUM 130 MG: 80 INJECTION, SOLUTION INTRAVENOUS; SUBCUTANEOUS at 01:49

## 2021-11-02 RX ADMIN — SODIUM CHLORIDE, PRESERVATIVE FREE 10 ML: 5 INJECTION INTRAVENOUS at 15:32

## 2021-11-02 RX ADMIN — ASPIRIN 325 MG: 325 TABLET ORAL at 01:49

## 2021-11-02 RX ADMIN — HYDROCODONE BITARTRATE AND ACETAMINOPHEN 1 TABLET: 5; 325 TABLET ORAL at 09:28

## 2021-11-02 RX ADMIN — MORPHINE SULFATE 2 MG: 2 INJECTION, SOLUTION INTRAMUSCULAR; INTRAVENOUS at 15:31

## 2021-11-02 RX ADMIN — SODIUM CHLORIDE 1000 ML: 9 INJECTION, SOLUTION INTRAVENOUS at 08:05

## 2021-11-02 RX ADMIN — INSULIN HUMAN 10 UNITS: 100 INJECTION, SOLUTION PARENTERAL at 23:41

## 2021-11-02 RX ADMIN — PERFLUTREN 2 ML: 6.52 INJECTION, SUSPENSION INTRAVENOUS at 16:10

## 2021-11-02 RX ADMIN — INSULIN HUMAN 5 UNITS: 100 INJECTION, SOLUTION PARENTERAL at 18:02

## 2021-11-02 NOTE — PROGRESS NOTES
Pharmacy consult for Lovenox dosing    Grace Renee is a 71 y.o. female 127 kg (280 lb).    Pharmacy consulted to dose for Active DVT/PE per Samantha SIFUENTES's request.    Estimated Creatinine Clearance: 52.6 mL/min (A) (by C-G formula based on SCr of 1.38 mg/dL (H)).  Creatinine   Date Value Ref Range Status   11/02/2021 1.38 (H) 0.57 - 1.00 mg/dL Final   11/01/2021 1.61 (H) 0.57 - 1.00 mg/dL Final     Platelets   Date Value Ref Range Status   11/02/2021 253 140 - 450 10*3/mm3 Final   11/01/2021 225 140 - 450 10*3/mm3 Final       Home anticoagulation: no meds listed on PTA med list    PLAN:  I will start Lovenox 1 mg/kg sc Q 12 hr.      I will profile the medication order and remove consult placeholder order.  Pharmacy services will follow for dose changes per renal policy    Zeenat Jimenez, Pharm.D., BCPS  11/02/21 08:29 EDT

## 2021-11-02 NOTE — PLAN OF CARE
Goal Outcome Evaluation:  Plan of Care Reviewed With: patient        Progress: no change   Pt admitted after having a syncope episode while at [facility]. CXR negative for infiltrates, CHF and Effusions. D Dimer noted to be 8.36 Troponin 0.185 On call practitioner notified. Uses [facility] with the assist of 1 staff. Aox4. Covid negative.

## 2021-11-02 NOTE — H&P
Patient Name:  Grace Renee  YOB: 1950  MRN:  [id number]  Admit Date:  11/1/2021  Patient Care Team:  Jani Sun MD as PCP - General (Family Medicine)      Subjective   History Present Illness     Chief Complaint   Patient presents with   • Shortness of Breath   • Syncope     History of Present Illness   Ms. Renee is a 71 y.o. non-smoker with a history of CONNOR, HTN, HLD, GERD, carpal tunnel and DM2 that presents to [facility] complaining of dyspnea and syncope. The patient states for the last several months she has been dealing with some left hip pain as well as bilateral upper extremity paraesthesias. She recently reports a detailed work up with [facility] providers where she had MRI of her spine. She states she has bilateral carpal tunnel related to some stenosis in her spine as well as left hip pain from this as well. She was being seen by Dr. Hunt with hand surgery and underwent recent repair of her left hand on 10/15/21 and is scheduled for right hand surgery on 11/22/21. She reports no post operative complications from this.   The patient states she was doing well at home and was going to bin with her sister and niece yesterday. She drove from [place] to the [employer] tomas where she reports feeling more weak than normal when getting out of the car. She was able to walk into the tomas, but felt more short of breath in the process. Her sister had her sit down and catch her breath where she initially recovered. She states she got back up to go pay for her cards and again felt short of breath with exertion. She had to stop at a closer table on the way back, but felt like she was going to pass out. She yelled for her sister to help her prior to fall backwards and losing consciousness. She states she woke up to people calling her name. She denies any associated chest pain, cough, fever or chills. She denies any recent changes in bowel or bladder. She is unaware of any prior  history of clotting and denies prior syncopal episodes in the past.    In the ED, CXR and CT head were negative for acute findings. WBC were 12.92, hemoglobin 12.5, troponin 0.033, BNP 99.8, glucose 451 and creatinine 1.61. ALT was 37 and AST 57. RVP negative. D-dimer was 8.36 and troponin increased to 0.185->0.249.  EKG showed ST with anterior Q waves. There was high suspicion for PE and Lovenox started. Cardiology was consulted and CTA chest performed this morning showing bilateral pulmonary thromboemboli with RV:LV 1.6. She has been intermittently tachycardic with BP trending down to 96/66 on last check. She is 96% on 6 L at this time.    Review of Systems   Constitutional: Negative for activity change, appetite change, chills, fatigue and fever.   HENT: Negative for congestion and ear pain.    Eyes: Negative for pain and discharge.   Respiratory: Positive for shortness of breath. Negative for cough and chest tightness.    Cardiovascular: Positive for leg swelling. Negative for chest pain.   Gastrointestinal: Negative for abdominal distention, abdominal pain, constipation, diarrhea, nausea and vomiting.   Genitourinary: Negative for difficulty urinating and dysuria.   Musculoskeletal: Positive for arthralgias. Negative for back pain.   Skin: Negative for color change and pallor.   Neurological: Positive for dizziness, syncope, weakness and light-headedness.   Psychiatric/Behavioral: Negative for confusion. The patient is not nervous/anxious.       Personal History     Past Medical History:   Diagnosis Date   • Diabetes mellitus (HCC)    • Elevated cholesterol    • GERD (gastroesophageal reflux disease)    • Hypertension    • Sleep apnea      History reviewed. No pertinent surgical history.  History reviewed. No pertinent family history.  Social History     Tobacco Use   • Smoking status: Never Smoker   • Smokeless tobacco: Never Used   Substance Use Topics   • Alcohol use: Not Currently   • Drug use: Never     No  medications prior to admission.     Allergies:  No Known Allergies    Objective    Objective     Vital Signs  Temp:  [97.4 °F (36.3 °C)-98.2 °F (36.8 °C)] 97.4 °F (36.3 °C)  Heart Rate:  [] 84  Resp:  [16-24] 16  BP: ()/(20-81) 92/51  SpO2:  [95 %-99 %] 95 %  on  Flow (L/min):  [6] 6;   Device (Oxygen Therapy): humidified; nasal cannula  Body mass index is 43.85 kg/m².    Physical Exam  Vitals and nursing note reviewed.   Constitutional:       General: She is not in acute distress.     Appearance: She is obese. She is ill-appearing.   HENT:      Head: Normocephalic and atraumatic.      Right Ear: External ear normal.      Left Ear: External ear normal.      Nose: Nose normal.   Eyes:      General:         Right eye: No discharge.         Left eye: No discharge.      Conjunctiva/sclera: Conjunctivae normal.   Cardiovascular:      Rate and Rhythm: Regular rhythm. Tachycardia present.      Pulses: Normal pulses.      Heart sounds: Normal heart sounds.   Pulmonary:      Effort: Respiratory distress (mild, currently reporting severe left hip pain) present.      Comments: Diminished throughout. Fairly clear. 96% on 6 L  Abdominal:      General: Bowel sounds are normal. There is no distension.      Palpations: Abdomen is soft.      Tenderness: There is no abdominal tenderness.   Musculoskeletal:         General: Swelling (bilateral lower extremities.) and tenderness (to left hip/groin) present. Normal range of motion.      Cervical back: Normal range of motion and neck supple.   Skin:     General: Skin is warm and dry.      Findings: No bruising.   Neurological:      Mental Status: She is alert and oriented to person, place, and time.      Sensory: No sensory deficit.      Coordination: Coordination normal.   Psychiatric:      Comments: Mildly anxious, but pleasant and conversing.        Results Review:  I reviewed the patient's new clinical results.  I reviewed the patient's new imaging results and agree with  the interpretation.  I reviewed the patient's other test results and agree with the interpretation  I personally viewed and interpreted the patient's EKG/Telemetry data    Lab Results (last 24 hours)     Procedure Component Value Units Date/Time    CBC & Differential [[id number]]  (Abnormal) Collected: 11/01/21 1914    Specimen: Blood Updated: 11/01/21 1925    Narrative:      The following orders were created for panel order CBC & Differential.  Procedure                               Abnormality         Status                     ---------                               -----------         ------                     CBC Auto Differential[[id number]]        Abnormal            Final result                 Please view results for these tests on the individual orders.    Comprehensive Metabolic Panel [[id number]]  (Abnormal) Collected: 11/01/21 1914    Specimen: Blood Updated: 11/01/21 1958     Glucose 451 mg/dL      BUN 31 mg/dL      Creatinine 1.61 mg/dL      Sodium 134 mmol/L      Potassium 4.7 mmol/L      Chloride 97 mmol/L      CO2 22.1 mmol/L      Calcium 9.4 mg/dL      Total Protein 8.0 g/dL      Albumin 4.10 g/dL      ALT (SGPT) 37 U/L      AST (SGOT) 57 U/L      Alkaline Phosphatase 101 U/L      Total Bilirubin 0.4 mg/dL      eGFR  African Amer 38 mL/min/1.73      Globulin 3.9 gm/dL      A/G Ratio 1.1 g/dL      BUN/Creatinine Ratio 19.3     Anion Gap 14.9 mmol/L     Narrative:      GFR Normal >60  Chronic Kidney Disease <60  Kidney Failure <15      Troponin [[id number]]  (Abnormal) Collected: 11/01/21 1914    Specimen: Blood Updated: 11/01/21 1950     Troponin T 0.033 ng/mL     Narrative:      Troponin T Reference Range:  <= 0.03 ng/mL-   Negative for AMI  >0.03 ng/mL-     Abnormal for myocardial necrosis.  Clinicians would have to utilize clinical acumen, EKG, Troponin and serial changes to determine if it is an Acute Myocardial Infarction or myocardial injury due to an underlying chronic condition.       Results  may be falsely decreased if patient taking Biotin.      BNP [[id number]]  (Normal) Collected: 11/01/21 1914    Specimen: Blood Updated: 11/01/21 1944     proBNP 99.8 pg/mL     Narrative:      Among patients with dyspnea, NT-proBNP is highly sensitive for the detection of acute congestive heart failure. In addition NT-proBNP of <300 pg/ml effectively rules out acute congestive heart failure with 99% negative predictive value.    Results may be falsely decreased if patient taking Biotin.      CBC Auto Differential [[id number]]  (Abnormal) Collected: 11/01/21 1914    Specimen: Blood Updated: 11/01/21 1925     WBC 12.92 10*3/mm3      RBC 4.42 10*6/mm3      Hemoglobin 12.5 g/dL      Hematocrit 39.0 %      MCV 88.2 fL      MCH 28.3 pg      MCHC 32.1 g/dL      RDW 12.4 %      RDW-SD 39.4 fl      MPV 10.8 fL      Platelets 225 10*3/mm3      Neutrophil % 70.8 %      Lymphocyte % 18.1 %      Monocyte % 6.8 %      Eosinophil % 3.0 %      Basophil % 0.8 %      Neutrophils, Absolute 9.14 10*3/mm3      Lymphocytes, Absolute 2.34 10*3/mm3      Monocytes, Absolute 0.88 10*3/mm3      Eosinophils, Absolute 0.39 10*3/mm3      Basophils, Absolute 0.10 10*3/mm3     Respiratory Panel PCR w/COVID-19(SARS-CoV-2) KENDALL/JOHN/HANNAH/PAD/COR/MAD/ZEKE In-House, NP Swab in [facility], 3-4 HR TAT - Swab, Nasopharynx [[id number]]  (Normal) Collected: 11/01/21 1954    Specimen: Swab from Nasopharynx Updated: 11/01/21 2125     ADENOVIRUS, PCR Not Detected     Coronavirus 229E Not Detected     Coronavirus HKU1 Not Detected     Coronavirus NL63 Not Detected     Coronavirus OC43 Not Detected     COVID19 Not Detected     Human Metapneumovirus Not Detected     Human Rhinovirus/Enterovirus Not Detected     Influenza A PCR Not Detected     Influenza B PCR Not Detected     Parainfluenza Virus 1 Not Detected     Parainfluenza Virus 2 Not Detected     Parainfluenza Virus 3 Not Detected     Parainfluenza Virus 4 Not Detected     RSV, PCR Not Detected     Bordetella  pertussis pcr Not Detected     Bordetella parapertussis PCR Not Detected     Chlamydophila pneumoniae PCR Not Detected     Mycoplasma pneumo by PCR Not Detected    Narrative:      In the setting of a positive respiratory panel with a viral infection PLUS a negative procalcitonin without other underlying concern for bacterial infection, consider observing off antibiotics or discontinuation of antibiotics and continue supportive care. If the respiratory panel is positive for atypical bacterial infection (Bordetella pertussis, Chlamydophila pneumoniae, or Mycoplasma pneumoniae), consider antibiotic de-escalation to target atypical bacterial infection.    Troponin [[id number]]  (Abnormal) Collected: 11/01/21 2148    Specimen: Blood Updated: 11/01/21 2226     Troponin T 0.185 ng/mL     Narrative:      Troponin T Reference Range:  <= 0.03 ng/mL-   Negative for AMI  >0.03 ng/mL-     Abnormal for myocardial necrosis.  Clinicians would have to utilize clinical acumen, EKG, Troponin and serial changes to determine if it is an Acute Myocardial Infarction or myocardial injury due to an underlying chronic condition.       Results may be falsely decreased if patient taking Biotin.      POC Glucose Once [[id number]]  (Abnormal) Collected: 11/01/21 2159    Specimen: Blood Updated: 11/01/21 2200     Glucose 316 mg/dL      Comment: Meter: [id number] : [id number] Isaias Jim RN       Hemoglobin A1c [[id number]]  (Abnormal) Collected: 11/02/21 0144    Specimen: Blood Updated: 11/02/21 0435     Hemoglobin A1C 9.27 %     Narrative:      Hemoglobin A1C Ranges:    Increased Risk for Diabetes  5.7% to 6.4%  Diabetes                     >= 6.5%  Diabetic Goal                < 7.0%    D-dimer, Quantitative [[id number]]  (Abnormal) Collected: 11/02/21 0144    Specimen: Blood Updated: 11/02/21 0256     D-Dimer, Quantitative 8.36 MCGFEU/mL     Narrative:      The Stago D-Dimer test used in conjunction with a clinical pretest probability (PTP)  assessment model, has been approved by the FDA to rule out the presence of venous thromboembolism (VTE) in outpatients suspected of deep venous thrombosis (DVT) or pulmonary embolism (PE). The cut-off for negative predictive value is <0.50 MCGFEU/mL.    Troponin [[id number]]  (Abnormal) Collected: 11/02/21 0541    Specimen: Blood Updated: 11/02/21 0728     Troponin T 0.249 ng/mL     Narrative:      Troponin T Reference Range:  <= 0.03 ng/mL-   Negative for AMI  >0.03 ng/mL-     Abnormal for myocardial necrosis.  Clinicians would have to utilize clinical acumen, EKG, Troponin and serial changes to determine if it is an Acute Myocardial Infarction or myocardial injury due to an underlying chronic condition.       Results may be falsely decreased if patient taking Biotin.      Comprehensive Metabolic Panel [[id number]]  (Abnormal) Collected: 11/02/21 0541    Specimen: Blood Updated: 11/02/21 0726     Glucose 136 mg/dL      BUN 27 mg/dL      Creatinine 1.38 mg/dL      Sodium 141 mmol/L      Potassium 4.1 mmol/L      Chloride 102 mmol/L      CO2 27.5 mmol/L      Calcium 9.6 mg/dL      Total Protein 7.3 g/dL      Albumin 3.90 g/dL      ALT (SGPT) 33 U/L      AST (SGOT) 41 U/L      Alkaline Phosphatase 97 U/L      Total Bilirubin 0.3 mg/dL      eGFR  African Amer 46 mL/min/1.73      Globulin 3.4 gm/dL      A/G Ratio 1.1 g/dL      BUN/Creatinine Ratio 19.6     Anion Gap 11.5 mmol/L     Narrative:      GFR Normal >60  Chronic Kidney Disease <60  Kidney Failure <15      CBC (No Diff) [[id number]]  (Abnormal) Collected: 11/02/21 0541    Specimen: Blood Updated: 11/02/21 0713     WBC 11.86 10*3/mm3      RBC 4.19 10*6/mm3      Hemoglobin 11.8 g/dL      Hematocrit 35.6 %      MCV 85.0 fL      MCH 28.2 pg      MCHC 33.1 g/dL      RDW 12.8 %      RDW-SD 38.4 fl      MPV 11.0 fL      Platelets 253 10*3/mm3     POC Glucose Once [[id number]]  (Abnormal) Collected: 11/02/21 0547    Specimen: Blood Updated: 11/02/21 0549     Glucose 132  mg/dL      Comment: Meter: [id number] : [id number] Harper ANTONIO       POC Glucose Once [[id number]]  (Abnormal) Collected: 11/02/21 1050    Specimen: Blood Updated: 11/02/21 1051     Glucose 208 mg/dL      Comment: Meter: [id number] : [id number] Turner ANTONIO             Imaging Results (Last 24 Hours)     Procedure Component Value Units Date/Time    CT Angiogram Chest With & Without Contrast [[id number]] Collected: 11/02/21 1012     Updated: 11/02/21 1031    Narrative:      CT ANGIOGRAM OF THE CHEST. MULTIPLE CORONAL, SAGITTAL, AND 3D  RECONSTRUCTIONS     HISTORY: 71-year-old female with shortness of breath. Evaluate for  pulmonary thromboemboli.     TECHNIQUE: Radiation dose reduction techniques were utilized, including  automated exposure control and exposure modulation based on body size.   CT angiogram of the chest was performed following the administration of  IV contrast. Multiple coronal, sagittal, and 3D reconstruction images  were obtained. There is no previous CT for comparison.     FINDINGS: There is thrombus within the distal right main pulmonary  artery extending into the right upper, right middle, and lower lobe  pulmonary arteries. There is a smaller volume of pulmonary thromboemboli  within the lingular and left lower lobe pulmonary arteries. There is no  thrombus within the left main pulmonary artery. The RV:LV ratio is 1.6  (4.6/2.8). There are no airspace consolidations other than mild  dependent atelectatic changes. There are no pleural or pericardial  effusions. There is no lymphadenopathy within the chest. Gastric banding  is noted at the visualized upper abdomen. There is fairly extensive  fatty infiltration of the liver.       Impression:      Bilateral pulmonary thromboemboli as described. The RV:LV  ratio is 1.6.     Discussed with the patient's nurse by phone.     This report was finalized on 11/2/2021 10:28 AM by Dr. Valerie Martinez M.D.       CT Head Without Contrast  [[id number]] Collected: 11/01/21 2152     Updated: 11/01/21 2203    Narrative:      CT HEAD WITHOUT CONTRAST     HISTORY: Syncope     COMPARISON: None available.     TECHNIQUE: Axial CT imaging was obtained through the brain. No IV  contrast was administered.     FINDINGS:  No acute intracranial hemorrhage is seen. Ventricles are normal in size.  There is no midline shift or mass effect. No calvarial fracture is seen.  There is mucosal thickening noted within the left maxillary sinus. There  is also a mucous retention cyst. Mastoid air cells appear clear.       Impression:      No acute intracranial findings.     Radiation dose reduction techniques were utilized, including automated  exposure control and exposure modulation based on body size.     This report was finalized on 11/1/2021 10:00 PM by Dr. Josie Malik M.D.       XR Chest 1 View [[id number]] Collected: 11/01/21 2016     Updated: 11/01/21 2016    Narrative:      PORTABLE CHEST     HISTORY: Cough.     FINDINGS: A single view of the chest was obtained. The study is hampered  by the patient's body habitus. The heart is at the upper limits of  normal in size. There is no evidence of infiltrate, effusion or of  congestive failure.             ECG 12 Lead   Preliminary Result   HEART RATE= 78  bpm   RR Interval= 764  ms   HI Interval= 155  ms   P Horizontal Axis= 36  deg   P Front Axis= 51  deg   QRSD Interval= 85  ms   QT Interval= 425  ms   QRS Axis= 28  deg   T Wave Axis= 53  deg   - BORDERLINE ECG -   Sinus rhythm   Borderline prolonged QT interval   Electronically Signed By:    Date and Time of Study: 2021-11-02 11:05:22      ECG 12 Lead   Final Result   HEART RATE= 104  bpm   RR Interval= 580  ms   HI Interval= 167  ms   P Horizontal Axis=   deg   P Front Axis= 51  deg   QRSD Interval= 90  ms   QT Interval= 353  ms   QRS Axis= 53  deg   T Wave Axis= 11  deg   - ABNORMAL ECG -   Sinus tachycardia   Low voltage, precordial leads   Abnormal inferior Q  waves   Borderline T abnormalities, anterior leads   NO PRIOR TRACING AVAILABLE FOR COMPARISON   Electronically Signed By: Venu Valenzuela ([facility]) 01-Nov-2021 20:07:40   Date and Time of Study: 2021-11-01 19:08:51           Assessment/Plan     Active Hospital Problems    Diagnosis  POA   • **Syncope [R55]  Yes   • Left hip pain [M25.552]  Yes   • Carpal tunnel syndrome, bilateral [G56.03]  Yes   • Bilateral pulmonary embolism (HCC) [I26.99]  Yes   • Sleep apnea [G47.30]  Yes   • Hypertension [I10]  Yes   • GERD (gastroesophageal reflux disease) [K21.9]  Yes   • Elevated cholesterol [E78.00]  Yes   • Type 2 diabetes mellitus, with long-term current use of insulin (HCC) [E11.9, Z79.4]  Not Applicable   • DINAH (acute kidney injury) (HCC) [N17.9]  Yes     Ms. Renee is a 71 year old female who initially presented to the hospital with complaints of a syncopal episode at [facility]. She recently had a carpal tunnel surgery 10/15. Upon arrival to the hospital, her d-dimer was 8 and troponin trending up to 0.2. CTA confirmed presence of bilateral PE with heart strain.     · Syncope: Secondary to submassive PE. CT head negative.   · Bilateral PE: ? Related to recent surgeries in the setting of obesity. D-dimer markedly elevated with troponin rising with heart strain. Cardiology following and will need intervention. She is on weight-based Lovenox. Will plan to check lower extremity dopplers.  · Left hip pain: Acute on chronic. With her increased pain, do wonder if she has a DVT in this leg or if she injured herself further with fall. Will need x-ray/CT imaging as well as dopplers once more life-threatening issues resolved. Pain medication as needed.  · Carpel tunnel: Bilateral and being followed by Dr. Hunt.  · HTN: She reports she takes lisinopril but uncertain of her home meds. Regardless, she is hypotensive. Hold all agents. Bolus ordered.  · DM2: On insulin therapy at home. Await home medication list. Will monitor ACHS and use  SSI as needed. A1c 9% showing poor control.   · HLD: ? Home therapy. LFTs up at present. Monitor labs.  · DINAH: With questionable baseline. Improving with fluids overnight. Monitor trends.    · I discussed the patients findings and my recommendations with patient, nursing staff and Dr. Newell.    VTE Prophylaxis - Pharmacy to dose Lovenox.  Code Status - Full code. Confirmed with patient at bedside.       MARIA Purcell  [facility]  11/02/21  10:00 AM   Adequate: hears normal conversation without difficulty

## 2021-11-02 NOTE — PLAN OF CARE
Goal Outcome Evaluation:  Plan of Care Reviewed With: patient        Progress: improving     Pt admitted to CCU for chest pain, hypotension. No c/o chest pain upon arrival to unit. Pt went to cath lab for thrombectomy of bilat PE. VSS. Slightly hypotensive on arrival initally, and hypertensive after returning from cath lab with SBP <160. Pt complaints of pain in L hip. Treated with prn morphine, position changes. Arrived from cath lab on heparin gtt. Fem site soft, intact. Family updated at bedside.

## 2021-11-02 NOTE — PROGRESS NOTES
Name: Grace Renee ADMIT: 2021   : 1950  PCP: Jani Sun MD    MRN: [id number] LOS: 0 days   AGE/SEX: 71 y.o. female  ROOM: [facility]   Subjective   Chief Complaint   Patient presents with   • Shortness of Breath   • Syncope     Returned to see the patient a second time later in the morning due to hypotension and worsening status and concern of the RN    Pt in acute distress. +Resp distress.     Had CT with +PE  On lovenox        ROS  No f/c  No n/v  +cp/palp  +soa/cough    Objective   Vital Signs  Temp:  [97.8 °F (36.6 °C)-98.2 °F (36.8 °C)] 98.2 °F (36.8 °C)  Heart Rate:  [] 83  Resp:  [16-24] 16  BP: ()/(20-81) 92/51  SpO2:  [95 %-99 %] 98 %  on  Flow (L/min):  [6] 6;   Device (Oxygen Therapy): humidified; nasal cannula  Body mass index is 43.85 kg/m².    Physical Exam  Constitutional:       General: She is in acute distress.      Appearance: She is obese. She is ill-appearing and toxic-appearing.   HENT:      Head: Normocephalic and atraumatic.   Pulmonary:      Effort: Respiratory distress present.      Breath sounds: No wheezing.   Abdominal:      Palpations: Abdomen is soft.   Musculoskeletal:      Right lower leg: Edema present.      Left lower leg: Edema present.         Results Review:       I reviewed the patient's new clinical results.  Results from last 7 days   Lab Units 21  0541 21   WBC 10*3/mm3 11.86* 12.92*   HEMOGLOBIN g/dL 11.8* 12.5   PLATELETS 10*3/mm3 253 225     Results from last 7 days   Lab Units 21  0541 21   SODIUM mmol/L 141 134*   POTASSIUM mmol/L 4.1 4.7   CHLORIDE mmol/L 102 97*   CO2 mmol/L 27.5 22.1   BUN mg/dL 27* 31*   CREATININE mg/dL 1.38* 1.61*   GLUCOSE mg/dL 136* 451*   Estimated Creatinine Clearance: 52.6 mL/min (A) (by C-G formula based on SCr of 1.38 mg/dL (H)).  Results from last 7 days   Lab Units 21  0541 21  1914   ALBUMIN g/dL 3.90 4.10   BILIRUBIN mg/dL 0.3 0.4   ALK PHOS U/L 97 101    AST (SGOT) U/L 41* 57*   ALT (SGPT) U/L 33 37*     Results from last 7 days   Lab Units 11/02/21  0541 11/01/21  1914   CALCIUM mg/dL 9.6 9.4   ALBUMIN g/dL 3.90 4.10         Coag     HbA1C   Lab Results   Component Value Date    HGBA1C 9.27 (H) 11/02/2021     Infection     Radiology(recent) CT Head Without Contrast    Result Date: 11/1/2021  No acute intracranial findings.  Radiation dose reduction techniques were utilized, including automated exposure control and exposure modulation based on body size.  This report was finalized on 11/1/2021 10:00 PM by Dr. Josie Malik M.D.      CT Angiogram Chest With & Without Contrast    Result Date: 11/2/2021  Bilateral pulmonary thromboemboli as described. The RV:LV ratio is 1.6.  Discussed with the patient's nurse by phone.  This report was finalized on 11/2/2021 10:28 AM by Dr. Valerie Martinez M.D.      Troponin T   Date Value Ref Range Status   11/02/2021 0.249 (C) 0.000 - 0.030 ng/mL Final     No components found for: TSH;2    enoxaparin, 1 mg/kg, Subcutaneous, Q12H  insulin lispro, 0-9 Units, Subcutaneous, 4x Daily With Meals & Nightly  sodium chloride, 500 mL, Intravenous, Once      sodium chloride, 125 mL/hr    Diet Clear Liquid      Assessment/Plan      Active Hospital Problems    Diagnosis  POA   • **Syncope [R55]  Yes   • Left hip pain [M25.552]  Yes   • Carpal tunnel syndrome, bilateral [G56.03]  Yes   • Bilateral pulmonary embolism (HCC) [I26.99]  Yes   • Sleep apnea [G47.30]  Yes   • Hypertension [I10]  Yes   • GERD (gastroesophageal reflux disease) [K21.9]  Yes   • Elevated cholesterol [E78.00]  Yes   • Type 2 diabetes mellitus, with long-term current use of insulin (HCC) [E11.9, Z79.4]  Not Applicable   • DINAH (acute kidney injury) (HCC) [N17.9]  Yes      Resolved Hospital Problems   No resolved problems to display.     Returned to see the patient a second time due to hypotension and worsening status.     Submassive PE with acute resp distress, currently on  lovenox, with plans for thrombectomy today. Drop in BP to 79/51 - giving fluid bolus with improvement (92/51) but will go ahead and transfer to ICU for close monitoring as she is critical ill and risk for to deteriorate further. May need pressor agents or consideration of TPA if doesn't stabilize       DW RN  DW charge nurse      Full H/P to follow    Harrison Newell MD  [facility]  11/02/21  11:11 EDT          31 minutes critical care   10:40-11:11AM

## 2021-11-02 NOTE — CONSULTS
CONSULT NOTE    Patient Identification:  Grace Renee  71 y.o.  female  1950  [id number]            Requesting physician: Hospitalist    Reason for Consultation: Acute bilateral submassive PE with plans for EKOS catheter ICU comanagement        History of Present Illness:  71-year-old female history of sleep apnea hypertension hyperlipidemia GERD carpal tunnel presented to the emergency room with dyspnea and syncope.  Apparently she has been dealing with some left hip pain and some bilateral upper extremity paresthesias and recently underwent repair of left hand on 10/15/2021.  Apparently she started feeling weak and more short of breath with exertion.  In the emergency room elevated D-dimer led to CT angiogram which showed bilateral submassive PE and started on full dose therapeutic Lovenox.  She was on the floor and started decompensating with low blood pressure.  She was earlier evaluated by cardiology with plans for EKOS catheter-based thrombectomy.  She also reports that she has CONNOR but noncompliant with her CPAP.  Currently she reports some chest tightness and shortness of breath but requiring nasal cannula oxygen.  She had a brief episode of low hypertension which improved with fluid bolus.      Review of Systems  As above rest of the twelve point review system negative  Past Medical History:  Past Medical History:   Diagnosis Date   • Diabetes mellitus (HCC)    • Elevated cholesterol    • GERD (gastroesophageal reflux disease)    • Hypertension    • Sleep apnea        Past Surgical History:  History reviewed. No pertinent surgical history.     Home Meds:  No medications prior to admission.       Allergies:  No Known Allergies    Social History:   Social History     Socioeconomic History   • Marital status:    Tobacco Use   • Smoking status: Never Smoker   • Smokeless tobacco: Never Used   Substance and Sexual Activity   • Alcohol use: Not Currently   • Drug use: Never   • Sexual activity: Not  "Currently       Family History:  History reviewed. No pertinent family history.    Physical Exam:  /79   Pulse 81   Temp 97.4 °F (36.3 °C) (Oral)   Resp 16   Ht 170.2 cm (67\")   Wt 127 kg (280 lb)   SpO2 96%   BMI 43.85 kg/m²  Body mass index is 43.85 kg/m². 96% 127 kg (280 lb)  Physical Exam  Patient is examined using the personal protective equipment as per guidelines from infection control for this particular patient as enacted.  Hand hygiene was performed before and after patient interaction.  Well-developed morbidly obese body habitus  Eyes normal conjunctivae and pupils reactive to light  ENT Mallampati between 3 and 4 normal nasal exam  Neck midline trachea no thyromegaly  Chest no labored breathing clear  CVS regular rate and rhythm no lower extremity edema  Abdomen soft nontender no hepatosplenomegaly  CNS intact normal sensory exam  Skin no rashes no nodules  Psych oriented to time place and person normal memory  Musculoskeletal no cyanosis no clubbing normal range of motion        LABS:  Lab Results   Component Value Date    CALCIUM 9.6 11/02/2021     Results from last 7 days   Lab Units 11/02/21  0541 11/01/21 1914 11/01/21 1914   SODIUM mmol/L 141  --  134*   POTASSIUM mmol/L 4.1  --  4.7   CHLORIDE mmol/L 102  --  97*   CO2 mmol/L 27.5  --  22.1   BUN mg/dL 27*  --  31*   CREATININE mg/dL 1.38*  --  1.61*   GLUCOSE mg/dL 136*   < > 451*   CALCIUM mg/dL 9.6  --  9.4   WBC 10*3/mm3 11.86*  --  12.92*   HEMOGLOBIN g/dL 11.8*  --  12.5   PLATELETS 10*3/mm3 253  --  225   ALT (SGPT) U/L 33  --  37*   AST (SGOT) U/L 41*  --  57*   PROBNP pg/mL  --   --  99.8    < > = values in this interval not displayed.     Lab Results   Component Value Date    TROPONINT 0.249 (C) 11/02/2021     Results from last 7 days   Lab Units 11/02/21  0541 11/01/21 2148 11/01/21 1914   TROPONIN T ng/mL 0.249* 0.185* 0.033*                 Results from last 7 days   Lab Units 11/01/21 1954   ADENOVIRUS DETECTION BY " PCR  Not Detected   CORONAVIRUS 229E  Not Detected   CORONAVIRUS HKU1  Not Detected   CORONAVIRUS NL63  Not Detected   CORONAVIRUS OC43  Not Detected   HUMAN METAPNEUMOVIRUS  Not Detected   HUMAN RHINOVIRUS/ENTEROVIRUS  Not Detected   INFLUENZA B PCR  Not Detected   PARAINFLUENZA 1  Not Detected   PARAINFLUENZA VIRUS 2  Not Detected   PARAINFLUENZA VIRUS 3  Not Detected   PARAINFLUENZA VIRUS 4  Not Detected   BORDETELLA PERTUSSIS PCR  Not Detected   BORDETELLA PARAPERTUSSIS PCR  Not Detected   CHLAMYDOPHILA PNEUMONIAE PCR  Not Detected   MYCOPLAMA PNEUMO PCR  Not Detected   RSV, PCR  Not Detected             No results found for: TSH  Estimated Creatinine Clearance: 52.6 mL/min (A) (by C-G formula based on SCr of 1.38 mg/dL (H)).         Imaging: I personally visualized the images of scans/x-rays performed within last 3 days.      Assessment:  Syncope  Acute submassive PE  CONNOR intolerant to CPAP  Hypertension  GERD  Morbid obesity  Type 2 diabetes mellitus  Acute kidney injury      Recommendations:  At this point we have a elderly female with recent hand surgery postop presenting with bilateral submassive PE.  She was briefly hypotensive but resuscitated with IV fluids.  Plans per interventional cardiology for catheter-based EKOS thrombolysis.  Continue full dose Lovenox  Likely this reflects postop thromboembolism.  I will order Doppler lower extremity rule out DVT  Cardiology currently following  Educated patient on CONNOR and the importance of CPAP.  She will likely require repeat evaluation as outpatient sleep study.  If she desaturates with sleep during the hospitalization will recommend empiric CPAP.  ICU core measures  Monitor clinical course closely.    Critical care time 35 minutes          Alicia Clayton MD  11/2/2021  12:45 EDT      Much of this encounter note is an electronic transcription/translation of spoken language to printed text using Dragon Software.

## 2021-11-02 NOTE — ED NOTES
".  Nursing report ED to floor  Grace Renee  71 y.o.  female    HPI :   Chief Complaint   Patient presents with   • Shortness of Breath   • Syncope       Admitting doctor:   Grayson Mcnulty MD    Admitting diagnosis:   The primary encounter diagnosis was Syncope, unspecified syncope type. A diagnosis of Elevated troponin was also pertinent to this visit.    Code status:   Current Code Status     Date Active Code Status Order ID Comments User Context       11/2/2021 0035 CPR [id number]  Mayelin Weathers, APRN ED     Advance Care Planning Activity      Questions for Current Code Status     Question Answer    Code Status CPR    Medical Interventions (Level of Support Prior to Arrest) Full          Allergies:   Patient has no known allergies.    Intake and Output  No intake or output data in the 24 hours ending 11/02/21 0343    Weight:       11/01/21  2221   Weight: 127 kg (280 lb)       Most recent vitals:   Vitals:    11/01/21 1835 11/01/21 1928 11/01/21 2221   BP: 173/81     Pulse: 101 103    Resp: 24     Temp: 97.8 °F (36.6 °C)     SpO2: 96%     Weight:   127 kg (280 lb)   Height:   170.2 cm (67\")       Active LDAs/IV Access:   Lines, Drains & Airways     Active LDAs     Name Placement date Placement time Site Days    Peripheral IV 11/01/21 Right Antecubital 11/01/21  --  Antecubital  1                Labs (abnormal labs have a star):   Labs Reviewed   COMPREHENSIVE METABOLIC PANEL - Abnormal; Notable for the following components:       Result Value    Glucose 451 (*)     BUN 31 (*)     Creatinine 1.61 (*)     Sodium 134 (*)     Chloride 97 (*)     ALT (SGPT) 37 (*)     AST (SGOT) 57 (*)     eGFR   Amer 38 (*)     All other components within normal limits    Narrative:     GFR Normal >60  Chronic Kidney Disease <60  Kidney Failure <15     TROPONIN (IN-HOUSE) - Abnormal; Notable for the following components:    Troponin T 0.033 (*)     All other components within normal limits    Narrative:     Troponin T " Reference Range:  <= 0.03 ng/mL-   Negative for AMI  >0.03 ng/mL-     Abnormal for myocardial necrosis.  Clinicians would have to utilize clinical acumen, EKG, Troponin and serial changes to determine if it is an Acute Myocardial Infarction or myocardial injury due to an underlying chronic condition.       Results may be falsely decreased if patient taking Biotin.     TROPONIN (IN-HOUSE) - Abnormal; Notable for the following components:    Troponin T 0.185 (*)     All other components within normal limits    Narrative:     Troponin T Reference Range:  <= 0.03 ng/mL-   Negative for AMI  >0.03 ng/mL-     Abnormal for myocardial necrosis.  Clinicians would have to utilize clinical acumen, EKG, Troponin and serial changes to determine if it is an Acute Myocardial Infarction or myocardial injury due to an underlying chronic condition.       Results may be falsely decreased if patient taking Biotin.     CBC WITH AUTO DIFFERENTIAL - Abnormal; Notable for the following components:    WBC 12.92 (*)     Lymphocyte % 18.1 (*)     Neutrophils, Absolute 9.14 (*)     All other components within normal limits   D-DIMER, QUANTITATIVE - Abnormal; Notable for the following components:    D-Dimer, Quantitative 8.36 (*)     All other components within normal limits    Narrative:     The Stago D-Dimer test used in conjunction with a clinical pretest probability (PTP) assessment model, has been approved by the FDA to rule out the presence of venous thromboembolism (VTE) in outpatients suspected of deep venous thrombosis (DVT) or pulmonary embolism (PE). The cut-off for negative predictive value is <0.50 MCGFEU/mL.   POCT GLUCOSE FINGERSTICK - Abnormal; Notable for the following components:    Glucose 316 (*)     All other components within normal limits   RESPIRATORY PANEL PCR W/ COVID-19 (SARS-COV-2) KENDALL/JOHN/HANNAH/PAD/COR/MAD/ZEKE IN-HOUSE, NP SWAB IN UTM/VTP, 3-4 HR TAT - Normal    Narrative:     In the setting of a positive respiratory  panel with a viral infection PLUS a negative procalcitonin without other underlying concern for bacterial infection, consider observing off antibiotics or discontinuation of antibiotics and continue supportive care. If the respiratory panel is positive for atypical bacterial infection (Bordetella pertussis, Chlamydophila pneumoniae, or Mycoplasma pneumoniae), consider antibiotic de-escalation to target atypical bacterial infection.   BNP (IN-HOUSE) - Normal    Narrative:     Among patients with dyspnea, NT-proBNP is highly sensitive for the detection of acute congestive heart failure. In addition NT-proBNP of <300 pg/ml effectively rules out acute congestive heart failure with 99% negative predictive value.    Results may be falsely decreased if patient taking Biotin.     TROPONIN (IN-HOUSE)   HEMOGLOBIN A1C   COMPREHENSIVE METABOLIC PANEL   CBC (NO DIFF)   TROPONIN (IN-HOUSE)   POCT GLUCOSE FINGERSTICK   POCT GLUCOSE FINGERSTICK   POCT GLUCOSE FINGERSTICK   POCT GLUCOSE FINGERSTICK   POCT GLUCOSE FINGERSTICK   CBC AND DIFFERENTIAL    Narrative:     The following orders were created for panel order CBC & Differential.  Procedure                               Abnormality         Status                     ---------                               -----------         ------                     CBC Auto Differential[[id number]]        Abnormal            Final result                 Please view results for these tests on the individual orders.       EKG:   ECG 12 Lead   Final Result   HEART RATE= 104  bpm   RR Interval= 580  ms   AZ Interval= 167  ms   P Horizontal Axis=   deg   P Front Axis= 51  deg   QRSD Interval= 90  ms   QT Interval= 353  ms   QRS Axis= 53  deg   T Wave Axis= 11  deg   - ABNORMAL ECG -   Sinus tachycardia   Low voltage, precordial leads   Abnormal inferior Q waves   Borderline T abnormalities, anterior leads   NO PRIOR TRACING AVAILABLE FOR COMPARISON   Electronically Signed By: Venu Valenzuela  ([facility]) 01-Nov-2021 20:07:40   Date and Time of Study: 2021-11-01 19:08:51          Meds given in ED:   Medications   sodium chloride 0.9 % flush 10 mL (has no administration in time range)   sodium chloride 0.9 % flush 10 mL (has no administration in time range)   nitroglycerin (NITROSTAT) SL tablet 0.4 mg (has no administration in time range)   acetaminophen (TYLENOL) tablet 650 mg (has no administration in time range)   ondansetron (ZOFRAN) tablet 4 mg (has no administration in time range)     Or   ondansetron (ZOFRAN) injection 4 mg (has no administration in time range)   dextrose (GLUTOSE) oral gel 15 g (has no administration in time range)   dextrose (D50W) (25 g/50 mL) IV injection 25 g (has no administration in time range)   glucagon (human recombinant) (GLUCAGEN DIAGNOSTIC) injection 1 mg (has no administration in time range)   insulin lispro (ADMELOG) injection 0-9 Units (has no administration in time range)   enoxaparin (LOVENOX) syringe 130 mg (130 mg Subcutaneous Given 11/2/21 0149)   aspirin tablet 325 mg (325 mg Oral Given 11/2/21 0149)       Imaging results:  CT Head Without Contrast    Result Date: 11/1/2021  No acute intracranial findings.  Radiation dose reduction techniques were utilized, including automated exposure control and exposure modulation based on body size.  This report was finalized on 11/1/2021 10:00 PM by Dr. Josie Malik M.D.        Ambulatory status:   - with assistance    Social issues:   Social History     Socioeconomic History   • Marital status:         Nursing report ED to floor:       Diandra Esparza, RN  11/02/21 5314

## 2021-11-02 NOTE — CONSULTS
Date of Hospital Visit: 2021  Date of consult: 2021  Encounter Provider: Dm Tabor MD  Place of Service: [facility] CARDIOLOGY  Patient Name: Grace Renee  :1950  Referral Provider: Grayson Mcnulty MD    Chief complaint-syncope and shortness of breath    Reason for consult: Elevated troponin    History of Present Illness   Grace Renee is a 71 year old pt with a history of DM, GERD, HTN and sleep apnea.   Pt presented to ER with complaints of a syncopal episode. Pt reported she was at [facility] and states she got up to walk and felt short of breath and lightheaded. Pt reported she passed out. When EMS arrived her oxygen sats were 88% on room air. Pt reported she has been having episodes of shortness of breath with exertion since Saturday. Pt denied any chest pain. In ER CR 1.61, troponin 0.033, WBC 12.92, COVID 19 negative, CT of head was negative for anything acute, CXR showed no evidence of infiltrate, effusion or CHF. EKG showed , Pt was given lovenox until VQ scan can be obtained.         Past Medical History:   Diagnosis Date   • Diabetes mellitus (HCC)    • Elevated cholesterol    • GERD (gastroesophageal reflux disease)    • Hypertension    • Sleep apnea        History reviewed. No pertinent surgical history.    No medications prior to admission.       Current Meds  Scheduled Meds:insulin lispro, 0-9 Units, Subcutaneous, 4x Daily With Meals & Nightly  sodium chloride, 1,000 mL, Intravenous, Once      Continuous Infusions:   PRN Meds:.•  acetaminophen  •  dextrose  •  dextrose  •  glucagon (human recombinant)  •  [START ON 11/3/2021] influenza vaccine  •  nitroglycerin  •  ondansetron **OR** ondansetron  •  [COMPLETED] Insert peripheral IV **AND** sodium chloride  •  sodium chloride    Allergies as of 2021   • (No Known Allergies)       Social History     Socioeconomic History   • Marital status:    Tobacco Use   • Smoking status:  "Never Smoker   • Smokeless tobacco: Never Used   Substance and Sexual Activity   • Alcohol use: Not Currently   • Drug use: Never   • Sexual activity: Not Currently       History reviewed. No pertinent family history.    REVIEW OF SYSTEMS:   All systems reviewed and negative except as noted in HPI       Objective:   Temp:  [97.8 °F (36.6 °C)-98.2 °F (36.8 °C)] 98.2 °F (36.8 °C)  Heart Rate:  [] 92  Resp:  [18-24] 18  BP: ()/(66-81) 96/66  Body mass index is 43.85 kg/m².  Flowsheet Rows      First Filed Value   Admission Height 170.2 cm (67\") Documented at 11/01/2021 2221   Admission Weight 127 kg (280 lb) Documented at 11/01/2021 2221        Vitals:    11/02/21 0715   BP: 96/66   Pulse: 92   Resp: 18   Temp: 98.2 °F (36.8 °C)   SpO2: 98%       General Appearance:    Alert, cooperative, no distress on supplemental, obese   Head:    Normocephalic, without obvious abnormality, atraumatic   Eyes:            Lids and lashes normal, conjunctivae and sclerae normal, no   icterus, no pallor, corneas clear, PERRLA   Ears:    Ears appear intact with no abnormalities noted   Throat:   No oral lesions, no thrush, oral mucosa moist   Neck:   No adenopathy, supple, trachea midline, no thyromegaly, no   carotid bruit, no JVD   Back:     No kyphosis present, no scoliosis present, no skin lesions, erythema or scars, no tenderness to percussion or palpation, range of motion normal   Lungs:     Clear to auscultation,respirations regular, even and unlabored    Heart:    Regular rhythm and normal rate, normal S1 and S2, no murmur, no gallop, no rub, no click   Chest Wall:    No abnormalities observed   Abdomen:     Normal bowel sounds, no masses, no organomegaly, soft nontender, nondistended, no guarding, no rebound  tenderness   Extremities:   Moves all extremities well, no edema, no cyanosis, no redness   Pulses:   Pulses palpable and equal bilaterally. Normal radial, carotid, femoral, dorsalis pedis and posterior tibial " pulses bilaterally. Normal abdominal aorta   Skin:  Neurology:   Psychiatric:   No bleeding, bruising or rash   Normal speech and cranial nerve exam, no focal deficit   Alert and oriented x 3, normal mood and affect                 Review of Data:      Results from last 7 days   Lab Units 11/02/21  0541   SODIUM mmol/L 141   POTASSIUM mmol/L 4.1   CHLORIDE mmol/L 102   CO2 mmol/L 27.5   BUN mg/dL 27*   CREATININE mg/dL 1.38*   CALCIUM mg/dL 9.6   BILIRUBIN mg/dL 0.3   ALK PHOS U/L 97   ALT (SGPT) U/L 33   AST (SGOT) U/L 41*   GLUCOSE mg/dL 136*     Results from last 7 days   Lab Units 11/02/21  0541 11/01/21  2148 11/01/21  1914   TROPONIN T ng/mL 0.249* 0.185* 0.033*     @LABRCNTbnp@  Results from last 7 days   Lab Units 11/02/21  0541 11/01/21  1914   WBC 10*3/mm3 11.86* 12.92*   HEMOGLOBIN g/dL 11.8* 12.5   HEMATOCRIT % 35.6 39.0   PLATELETS 10*3/mm3 253 225             @LABRCNTIP(chol,trig,hdl,ldl)    I personally viewed and interpreted the patient's EKG/Telemetry data  )  Patient Active Problem List   Diagnosis   • Syncope     Assessment and Plan:    Ms. Renee is a 71 years old female patient with past medical history of hypertension, hyperlipidemia and type II DM, obesity came to the ER after syncopal episode that she sustained at a [employer] [facility].  She felt short of breath and lightheaded before passing out.  She reports she has had shortness of breath for past 4 days.  She has had worsening back problems and limping for the past 2 weeks or more with limited ambulation.  She was noted to be hypoxemic, hypotensive, tachycardic with elevated troponin D-dimer.  She probably has hemodynamically significant PE.  She received a therapeutic dose of Lovenox early this morning.  Her creatinine is slightly improved and I am giving her IV fluid and send her for stat CTA and also stat echocardiogram.  She may need invasive intervention for PE.    1.  Syncope and  acute hypoxemic respiratory failure-rule out PE  2.   Elevated troponin-no other evidence for ACS-likely due to myocardial injury from PE  3.  Hypotension-Baseline she is hypertensive on lisinopril and her blood pressure usually runs with systolic in the 140s but today it is below 100.  4.  Obesity  5.  Type II DM uncontrolled with A1c above 9  6.  Hyperlipidemia  7.  Degenerative back disease with radiculopathy and limited ambulation  8. DINAH- likely prerenal from hypotension-hold lisinopril.  She is getting IV fluid bolus    Further recommendations to follow CTA and echocardiogram findings.    Thank you for consulting with cardiology    Dm Tabor MD  11/02/21  08:08 EDT.  Time spent in reviewing chart, discussion and examination:

## 2021-11-03 ENCOUNTER — APPOINTMENT (OUTPATIENT)
Dept: CARDIOLOGY | Facility: HOSPITAL | Age: 71
End: 2021-11-03

## 2021-11-03 ENCOUNTER — RESEARCH ENCOUNTER (OUTPATIENT)
Dept: CARDIOLOGY | Facility: CLINIC | Age: 71
End: 2021-11-03

## 2021-11-03 PROBLEM — J96.01 ACUTE RESPIRATORY FAILURE WITH HYPOXIA: Status: ACTIVE | Noted: 2021-11-03

## 2021-11-03 LAB
APTT PPP: 138 SECONDS (ref 22.7–35.4)
APTT PPP: 87.9 SECONDS (ref 22.7–35.4)
APTT PPP: >200 SECONDS (ref 22.7–35.4)
BASOPHILS # BLD AUTO: 0.12 10*3/MM3 (ref 0–0.2)
BASOPHILS NFR BLD AUTO: 1.2 % (ref 0–1.5)
BH CV LOWER VASCULAR LEFT COMMON FEMORAL AUGMENT: NORMAL
BH CV LOWER VASCULAR LEFT COMMON FEMORAL COMPETENT: NORMAL
BH CV LOWER VASCULAR LEFT COMMON FEMORAL PHASIC: NORMAL
BH CV LOWER VASCULAR LEFT COMMON FEMORAL SPONT: NORMAL
BH CV LOWER VASCULAR LEFT DISTAL FEMORAL AUGMENT: NORMAL
BH CV LOWER VASCULAR LEFT DISTAL FEMORAL PHASIC: NORMAL
BH CV LOWER VASCULAR LEFT DISTAL FEMORAL SPONT: NORMAL
BH CV LOWER VASCULAR LEFT GASTRONEMIUS COMPRESS: NORMAL
BH CV LOWER VASCULAR LEFT GREATER SAPH AK COMPRESS: NORMAL
BH CV LOWER VASCULAR LEFT GREATER SAPH BK COMPRESS: NORMAL
BH CV LOWER VASCULAR LEFT LESSER SAPH COMPRESS: NORMAL
BH CV LOWER VASCULAR LEFT MID FEMORAL AUGMENT: NORMAL
BH CV LOWER VASCULAR LEFT MID FEMORAL PHASIC: NORMAL
BH CV LOWER VASCULAR LEFT MID FEMORAL SPONT: NORMAL
BH CV LOWER VASCULAR LEFT PERONEAL COMPRESS: NORMAL
BH CV LOWER VASCULAR LEFT POPLITEAL AUGMENT: NORMAL
BH CV LOWER VASCULAR LEFT POPLITEAL COMPETENT: NORMAL
BH CV LOWER VASCULAR LEFT POPLITEAL COMPRESS: NORMAL
BH CV LOWER VASCULAR LEFT POPLITEAL PHASIC: NORMAL
BH CV LOWER VASCULAR LEFT POPLITEAL SPONT: NORMAL
BH CV LOWER VASCULAR LEFT POSTERIOR TIBIAL COMPRESS: NORMAL
BH CV LOWER VASCULAR LEFT PROFUNDA FEMORAL AUGMENT: NORMAL
BH CV LOWER VASCULAR LEFT PROFUNDA FEMORAL PHASIC: NORMAL
BH CV LOWER VASCULAR LEFT PROFUNDA FEMORAL SPONT: NORMAL
BH CV LOWER VASCULAR LEFT PROXIMAL FEMORAL AUGMENT: NORMAL
BH CV LOWER VASCULAR LEFT PROXIMAL FEMORAL PHASIC: NORMAL
BH CV LOWER VASCULAR LEFT PROXIMAL FEMORAL SPONT: NORMAL
BH CV LOWER VASCULAR LEFT SAPHENOFEMORAL JUNCTION AUGMENT: NORMAL
BH CV LOWER VASCULAR LEFT SAPHENOFEMORAL JUNCTION PHASIC: NORMAL
BH CV LOWER VASCULAR LEFT SAPHENOFEMORAL JUNCTION SPONT: NORMAL
BH CV LOWER VASCULAR RIGHT DISTAL FEMORAL COMPRESS: NORMAL
BH CV LOWER VASCULAR RIGHT GASTRONEMIUS COMPRESS: NORMAL
BH CV LOWER VASCULAR RIGHT GREATER SAPH AK COMPRESS: NORMAL
BH CV LOWER VASCULAR RIGHT GREATER SAPH BK COMPRESS: NORMAL
BH CV LOWER VASCULAR RIGHT LESSER SAPH COMPRESS: NORMAL
BH CV LOWER VASCULAR RIGHT MID FEMORAL AUGMENT: NORMAL
BH CV LOWER VASCULAR RIGHT MID FEMORAL COMPETENT: NORMAL
BH CV LOWER VASCULAR RIGHT MID FEMORAL COMPRESS: NORMAL
BH CV LOWER VASCULAR RIGHT MID FEMORAL PHASIC: NORMAL
BH CV LOWER VASCULAR RIGHT MID FEMORAL SPONT: NORMAL
BH CV LOWER VASCULAR RIGHT PERONEAL COMPRESS: NORMAL
BH CV LOWER VASCULAR RIGHT POPLITEAL AUGMENT: NORMAL
BH CV LOWER VASCULAR RIGHT POPLITEAL COMPETENT: NORMAL
BH CV LOWER VASCULAR RIGHT POPLITEAL COMPRESS: NORMAL
BH CV LOWER VASCULAR RIGHT POPLITEAL PHASIC: NORMAL
BH CV LOWER VASCULAR RIGHT POPLITEAL SPONT: NORMAL
BH CV LOWER VASCULAR RIGHT POSTERIOR TIBIAL COMPRESS: NORMAL
BH CV LOWER VASCULAR RIGHT PROFUNDA FEMORAL COMPRESS: NORMAL
BH CV LOWER VASCULAR RIGHT PROXIMAL FEMORAL COMPRESS: NORMAL
DEPRECATED RDW RBC AUTO: 41.7 FL (ref 37–54)
EOSINOPHIL # BLD AUTO: 0.64 10*3/MM3 (ref 0–0.4)
EOSINOPHIL NFR BLD AUTO: 6.2 % (ref 0.3–6.2)
ERYTHROCYTE [DISTWIDTH] IN BLOOD BY AUTOMATED COUNT: 12.7 % (ref 12.3–15.4)
GLUCOSE BLDC GLUCOMTR-MCNC: 300 MG/DL (ref 70–130)
GLUCOSE BLDC GLUCOMTR-MCNC: 308 MG/DL (ref 70–130)
GLUCOSE BLDC GLUCOMTR-MCNC: 326 MG/DL (ref 70–130)
HCT VFR BLD AUTO: 30.7 % (ref 34–46.6)
HGB BLD-MCNC: 9.6 G/DL (ref 12–15.9)
IMM GRANULOCYTES # BLD AUTO: 0.04 10*3/MM3 (ref 0–0.05)
IMM GRANULOCYTES NFR BLD AUTO: 0.4 % (ref 0–0.5)
LYMPHOCYTES # BLD AUTO: 2.27 10*3/MM3 (ref 0.7–3.1)
LYMPHOCYTES NFR BLD AUTO: 21.9 % (ref 19.6–45.3)
MCH RBC QN AUTO: 28 PG (ref 26.6–33)
MCHC RBC AUTO-ENTMCNC: 31.3 G/DL (ref 31.5–35.7)
MCV RBC AUTO: 89.5 FL (ref 79–97)
MONOCYTES # BLD AUTO: 0.98 10*3/MM3 (ref 0.1–0.9)
MONOCYTES NFR BLD AUTO: 9.4 % (ref 5–12)
NEUTROPHILS NFR BLD AUTO: 6.33 10*3/MM3 (ref 1.7–7)
NEUTROPHILS NFR BLD AUTO: 60.9 % (ref 42.7–76)
NRBC BLD AUTO-RTO: 0 /100 WBC (ref 0–0.2)
PLATELET # BLD AUTO: 205 10*3/MM3 (ref 140–450)
PMV BLD AUTO: 11.3 FL (ref 6–12)
QT INTERVAL: 411 MS
RBC # BLD AUTO: 3.43 10*6/MM3 (ref 3.77–5.28)
WBC # BLD AUTO: 10.38 10*3/MM3 (ref 3.4–10.8)

## 2021-11-03 PROCEDURE — 85025 COMPLETE CBC W/AUTO DIFF WBC: CPT | Performed by: INTERNAL MEDICINE

## 2021-11-03 PROCEDURE — 85730 THROMBOPLASTIN TIME PARTIAL: CPT | Performed by: INTERNAL MEDICINE

## 2021-11-03 PROCEDURE — 99232 SBSQ HOSP IP/OBS MODERATE 35: CPT | Performed by: INTERNAL MEDICINE

## 2021-11-03 PROCEDURE — 93970 EXTREMITY STUDY: CPT

## 2021-11-03 PROCEDURE — 93005 ELECTROCARDIOGRAM TRACING: CPT | Performed by: INTERNAL MEDICINE

## 2021-11-03 PROCEDURE — 63710000001 INSULIN REGULAR HUMAN PER 5 UNITS: Performed by: INTERNAL MEDICINE

## 2021-11-03 PROCEDURE — 63710000001 INSULIN LISPRO (HUMAN) PER 5 UNITS: Performed by: INTERNAL MEDICINE

## 2021-11-03 PROCEDURE — 82962 GLUCOSE BLOOD TEST: CPT

## 2021-11-03 PROCEDURE — 63710000001 INSULIN GLARGINE PER 5 UNITS: Performed by: INTERNAL MEDICINE

## 2021-11-03 PROCEDURE — 25010000002 MORPHINE PER 10 MG: Performed by: INTERNAL MEDICINE

## 2021-11-03 PROCEDURE — 25010000002 HEPARIN (PORCINE) PER 1000 UNITS: Performed by: INTERNAL MEDICINE

## 2021-11-03 RX ORDER — MELATONIN
2000 DAILY
COMMUNITY

## 2021-11-03 RX ORDER — AMLODIPINE BESYLATE 10 MG/1
10 TABLET ORAL
COMMUNITY

## 2021-11-03 RX ORDER — ALLOPURINOL 100 MG/1
100 TABLET ORAL NIGHTLY
COMMUNITY

## 2021-11-03 RX ORDER — MELOXICAM 15 MG/1
15 TABLET ORAL DAILY
COMMUNITY
End: 2021-11-11 | Stop reason: HOSPADM

## 2021-11-03 RX ORDER — URSODIOL 300 MG/1
500 CAPSULE ORAL 2 TIMES DAILY
COMMUNITY

## 2021-11-03 RX ORDER — CALCIUM CARBONATE 200(500)MG
1 TABLET,CHEWABLE ORAL 3 TIMES DAILY PRN
Status: DISCONTINUED | OUTPATIENT
Start: 2021-11-03 | End: 2021-11-11 | Stop reason: HOSPADM

## 2021-11-03 RX ORDER — GABAPENTIN 300 MG/1
300 CAPSULE ORAL 2 TIMES DAILY
Status: ON HOLD | COMMUNITY
End: 2021-11-10 | Stop reason: SDUPTHER

## 2021-11-03 RX ORDER — OMEPRAZOLE 40 MG/1
40 CAPSULE, DELAYED RELEASE ORAL DAILY
COMMUNITY
End: 2021-11-11 | Stop reason: HOSPADM

## 2021-11-03 RX ORDER — INSULIN LISPRO 100 [IU]/ML
0-14 INJECTION, SOLUTION INTRAVENOUS; SUBCUTANEOUS
Status: DISCONTINUED | OUTPATIENT
Start: 2021-11-03 | End: 2021-11-11 | Stop reason: HOSPADM

## 2021-11-03 RX ORDER — LISINOPRIL AND HYDROCHLOROTHIAZIDE 20; 12.5 MG/1; MG/1
1 TABLET ORAL 2 TIMES DAILY
COMMUNITY
End: 2021-11-11 | Stop reason: HOSPADM

## 2021-11-03 RX ORDER — INSULIN GLARGINE 100 [IU]/ML
15 INJECTION, SOLUTION SUBCUTANEOUS EVERY MORNING
Status: DISCONTINUED | OUTPATIENT
Start: 2021-11-03 | End: 2021-11-05

## 2021-11-03 RX ORDER — POTASSIUM CHLORIDE 750 MG/1
10 TABLET, FILM COATED, EXTENDED RELEASE ORAL 2 TIMES DAILY
COMMUNITY
End: 2021-11-11 | Stop reason: HOSPADM

## 2021-11-03 RX ADMIN — ANTACID TABLETS 1 TABLET: 500 TABLET, CHEWABLE ORAL at 10:10

## 2021-11-03 RX ADMIN — INSULIN LISPRO 10 UNITS: 100 INJECTION, SOLUTION INTRAVENOUS; SUBCUTANEOUS at 13:20

## 2021-11-03 RX ADMIN — INSULIN HUMAN 10 UNITS: 100 INJECTION, SOLUTION PARENTERAL at 06:29

## 2021-11-03 RX ADMIN — SODIUM CHLORIDE, PRESERVATIVE FREE 10 ML: 5 INJECTION INTRAVENOUS at 09:00

## 2021-11-03 RX ADMIN — MORPHINE SULFATE 2 MG: 2 INJECTION, SOLUTION INTRAMUSCULAR; INTRAVENOUS at 06:23

## 2021-11-03 RX ADMIN — INSULIN LISPRO 10 UNITS: 100 INJECTION, SOLUTION INTRAVENOUS; SUBCUTANEOUS at 17:35

## 2021-11-03 RX ADMIN — INSULIN GLARGINE 15 UNITS: 100 INJECTION, SOLUTION SUBCUTANEOUS at 13:20

## 2021-11-03 RX ADMIN — HEPARIN SODIUM 15 UNITS/KG/HR: 10000 INJECTION, SOLUTION INTRAVENOUS at 05:54

## 2021-11-03 NOTE — PLAN OF CARE
Goal Outcome Evaluation:  Plan of Care Reviewed With: patient        Progress: improving       Pt remains in CCU as tele overflow. No complaints of pain. VSS. 2L NC, pt desats when she sleeps. Fem site clean and intact, stitch removed by Dr Pritchett. Up to chair and BSC this shift, reports feeling stronger. Remains on hep gtt. Family updated on phone by the patient.

## 2021-11-03 NOTE — CASE MANAGEMENT/SOCIAL WORK
Discharge Planning Assessment  [facility]     Patient Name: Grace Renee  MRN: [id number]  Today's Date: 11/3/2021    Admit Date: 11/1/2021     Discharge Needs Assessment     Row Name 11/03/21 1526       Living Environment    Lives With other relative(s)    Current Living Arrangements home/apartment/condo    Primary Care Provided by self    Provides Primary Care For no one    Family Caregiver if Needed sibling(s); other relative(s)    Quality of Family Relationships helpful; supportive    Able to Return to Prior Arrangements yes       Resource/Environmental Concerns    Resource/Environmental Concerns none       Transition Planning    Patient/Family Anticipates Transition to home    Patient/Family Anticipated Services at Transition none    Transportation Anticipated family or friend will provide; car, drives self       Discharge Needs Assessment    Readmission Within the Last 30 Days no previous admission in last 30 days    Equipment Currently Used at Home none    Concerns to be Addressed denies needs/concerns at this time               Discharge Plan     Row Name 11/03/21 1527       Plan    Plan Home    Plan Comments S/W pt at bedside.  Facesheet and pharmacy info confirmed.  Pt lives in a single story house w/ her niece Yandy, is IADLs and can drive. She does not usee any home DME.  She has used HH in the past but does not recall which agency.  No hx of SNF.  Pt states her niece can assist at home as needed - she works from home.  Pt did enroll in Meds to Bed............sk              Continued Care and Services - Admitted Since 11/1/2021    Coordination has not been started for this encounter.          Demographic Summary     Row Name 11/03/21 1525       General Information    Admission Type inpatient    Arrived From home    Referral Source admission list    Reason for Consult discharge planning    Preferred Language English               Functional Status     Row Name 11/03/21 1525       Functional Status     Usual Activity Tolerance moderate       Functional Status, IADL    Medications independent    Meal Preparation independent; assistive person    Housekeeping independent; assistive person    Laundry independent; assistive person    Shopping independent; assistive person       Mental Status    General Appearance WDL WDL       Mental Status Summary    Recent Changes in Mental Status/Cognitive Functioning no changes       Employment/    Employment Status retired                         Tiffanie Tenorio RN

## 2021-11-03 NOTE — PLAN OF CARE
Goal Outcome Evaluation:               Plan of Care Reviewed With: patient  Progress: improving   Pt had no pain in her chest but some productive cough, pain in her left hip at around 0600 given pain medication. Insertion site looks great, no bleeding or swelling. Pt had about 1000 ml of reed color urine. Ate an drinks ok. ptt has been over 200. This time drawn with peripheral stick to see if it has any error. Day shift nurse is aware and heparin had stopped for an hour per protocol.

## 2021-11-03 NOTE — PROGRESS NOTES
"      [facility]         Dr Vargas Sayied   LOS: 1 day   Patient Care Team:  Jani Sun MD as PCP - General (Family Medicine)    Chief Complaint: Acute submassive PE with morbid obesity CONNOR recent hip pain and surgery.    Interval History: Status post mechanical thrombectomy.  Currently on heparin drip.  Feels much better this morning.  Reports shortness of breath and chest tightness better.    REVIEW OF SYSTEMS:   CARDIOVASCULAR: No chest pain, chest pressure or chest discomfort. No palpitations or edema.   RESPIRATORY: Shortness of breath with activity.  GASTROINTESTINAL: No anorexia, nausea, vomiting or diarrhea. No abdominal pain or blood.   HEMATOLOGIC: No bleeding or bruising.     Ventilator/Non-Invasive Ventilation Settings (From admission, onward)            None            Vital Signs  Temp:  [97.1 °F (36.2 °C)-98.4 °F (36.9 °C)] 98.3 °F (36.8 °C)  Heart Rate:  [77-92] 86  Resp:  [9-20] 17  BP: ()/() 167/100    Intake/Output Summary (Last 24 hours) at 11/3/2021 1036  Last data filed at 11/3/2021 0857  Gross per 24 hour   Intake 2008 ml   Output 900 ml   Net 1108 ml     Flowsheet Rows      First Filed Value   Admission Height 170.2 cm (67\") Documented at 11/01/2021 2221   Admission Weight 127 kg (280 lb) Documented at 11/01/2021 2221          Physical Exam:  Patient is examined using the personal protective equipment as per guidelines from infection control for this particular patient as enacted.  Hand hygiene was performed before and after patient interaction.   General Appearance:    Alert, cooperative, in no acute distress.  Following simple commands  Neck midline trachea, no thyromegaly   Lungs:    Diminished breath sounds in the bases    Heart:    Regular rhythm and normal rate, normal S1 and S2, no            murmur, no gallop, no rub, no click   Chest Wall:    No abnormalities observed   Abdomen:     Normal bowel sounds, no masses, no organomegaly, soft        " nontender, nondistended, no guarding, no rebound                tenderness   Extremities:   Moves all extremities well, no edema, no cyanosis, no             redness  CNS no focal neurological deficits normal sensory exam  Skin no rashes no nodules  Musculoskeletal no cyanosis no clubbing normal range of motion     Results Review:        Results from last 7 days   Lab Units 11/02/21  0541 11/01/21 1914 11/01/21 1914   SODIUM mmol/L 141  --  134*   POTASSIUM mmol/L 4.1  --  4.7   CHLORIDE mmol/L 102  --  97*   CO2 mmol/L 27.5  --  22.1   BUN mg/dL 27*  --  31*   CREATININE mg/dL 1.38*  --  1.61*   GLUCOSE mg/dL 136*   < > 451*   CALCIUM mg/dL 9.6  --  9.4    < > = values in this interval not displayed.     Results from last 7 days   Lab Units 11/02/21  0541 11/01/21  2148 11/01/21 1914   TROPONIN T ng/mL 0.249* 0.185* 0.033*     Results from last 7 days   Lab Units 11/03/21  0522 11/02/21  1541 11/02/21  1432 11/02/21  1342 11/02/21  0541   WBC 10*3/mm3 10.38 11.11*  --   --  11.86*   HEMOGLOBIN g/dL 9.6* 10.4*  --   --  11.8*   HEMOGLOBIN, POC g/dL  --   --  11.6*   < >  --    HEMATOCRIT % 30.7* 32.4*  --   --  35.6   HEMATOCRIT POC %  --   --  34*   < >  --    PLATELETS 10*3/mm3 205 216  --   --  253    < > = values in this interval not displayed.     Results from last 7 days   Lab Units 11/03/21  0717 11/03/21  0522 11/02/21  2216 11/02/21  1541   INR   --   --   --  1.46*   APTT seconds 138.0* >200.0* >200.0*  --                        I reviewed the patient's new clinical results.  I personally viewed and interpreted the patient's chest x-ray.        Medication Review:   insulin regular, 0-14 Units, Subcutaneous, Q6H  sodium chloride, 10 mL, Intravenous, Q12H        heparin (porcine), 18 Units/kg/hr, Last Rate: 12 Units/kg/hr (11/03/21 0928)        ASSESSMENT:   Syncope  Acute submassive PE status post mechanical thrombectomy  CONNOR intolerant to CPAP  Hypertension  GERD  Morbid obesity  Type 2 diabetes  mellitus  Acute kidney injury    PLAN:  Much improved post mechanical thrombectomy.  Heparin drip possibly could be transitioned to NOAC per cardiology.  Home CPAP okay to use  Oxygen to keep sats above 90%  PT OT  Doppler lower extremity still pending  Mobilize ambulate  We'll transfer patient out of the ICU today.        Alicia Clayton MD  11/03/21  10:36 EDT

## 2021-11-03 NOTE — PROGRESS NOTES
Name: Grace Renee ADMIT: 2021   : 1950  PCP: Jani Sun MD    MRN: [id number] LOS: 1 days   AGE/SEX: 71 y.o. female  ROOM: [facility]   Subjective   Chief Complaint   Patient presents with   • Shortness of Breath   • Syncope       Had CT with +PE  Moved to ICU yesterday  S/p thrombectomy  On heparin gtt  Dyspnea better      ROS  No f/c  No n/v  +cp/palp  +soa/cough    Objective   Vital Signs  Temp:  [97.1 °F (36.2 °C)-98.4 °F (36.9 °C)] 98.3 °F (36.8 °C)  Heart Rate:  [77-92] 86  Resp:  [9-20] 17  BP: ()/() 167/100  SpO2:  [91 %-100 %] 93 %  on  Flow (L/min):  [2-6] 2;   Device (Oxygen Therapy): nasal cannula  Body mass index is 43.85 kg/m².    Physical Exam  Constitutional:       Appearance: She is obese. She is ill-appearing.   HENT:      Head: Normocephalic and atraumatic.   Eyes:      General:         Right eye: No discharge.         Left eye: No discharge.   Cardiovascular:      Rate and Rhythm: Normal rate and regular rhythm.   Pulmonary:      Effort: Respiratory distress present.      Breath sounds: No wheezing.   Abdominal:      Palpations: Abdomen is soft.   Musculoskeletal:      Right lower leg: Edema present.      Left lower leg: Edema present.   Neurological:      Mental Status: She is alert.   Psychiatric:         Mood and Affect: Mood normal.         Thought Content: Thought content normal.         Judgment: Judgment normal.         Results Review:       I reviewed the patient's new clinical results.  Results from last 7 days   Lab Units 21  0522 21  1541 21  1432 21  1342 21  0541 21   WBC 10*3/mm3 10.38 11.11*  --   --  11.86*  --  12.92*   HEMOGLOBIN g/dL 9.6* 10.4*  --   --  11.8*   < > 12.5   HEMOGLOBIN, POC g/dL  --   --  11.6* 11.6*  --   --   --    PLATELETS 10*3/mm3 205 216  --   --  253  --  225    < > = values in this interval not displayed.     Results from last 7 days   Lab Units 21  0537  11/01/21 1914   SODIUM mmol/L 141 134*   POTASSIUM mmol/L 4.1 4.7   CHLORIDE mmol/L 102 97*   CO2 mmol/L 27.5 22.1   BUN mg/dL 27* 31*   CREATININE mg/dL 1.38* 1.61*   GLUCOSE mg/dL 136* 451*   Estimated Creatinine Clearance: 52.6 mL/min (A) (by C-G formula based on SCr of 1.38 mg/dL (H)).  Results from last 7 days   Lab Units 11/02/21  0541 11/01/21  1914   ALBUMIN g/dL 3.90 4.10   BILIRUBIN mg/dL 0.3 0.4   ALK PHOS U/L 97 101   AST (SGOT) U/L 41* 57*   ALT (SGPT) U/L 33 37*     Results from last 7 days   Lab Units 11/02/21  0541 11/01/21 1914   CALCIUM mg/dL 9.6 9.4   ALBUMIN g/dL 3.90 4.10         Coag   Results from last 7 days   Lab Units 11/03/21  0717 11/03/21  0522 11/02/21  2216 11/02/21  1541   INR   --   --   --  1.46*   APTT seconds 138.0* >200.0* >200.0*  --      HbA1C   Lab Results   Component Value Date    HGBA1C 9.27 (H) 11/02/2021     Infection     Radiology(recent) CT Head Without Contrast    Result Date: 11/1/2021  No acute intracranial findings.  Radiation dose reduction techniques were utilized, including automated exposure control and exposure modulation based on body size.  This report was finalized on 11/1/2021 10:00 PM by Dr. Josie Malik M.D.      CT Angiogram Chest With & Without Contrast    Result Date: 11/2/2021  Bilateral pulmonary thromboemboli as described. The RV:LV ratio is 1.6.  Discussed with the patient's nurse by phone.  This report was finalized on 11/2/2021 10:28 AM by Dr. Valerie Martinez M.D.      Troponin T   Date Value Ref Range Status   11/02/2021 0.249 (C) 0.000 - 0.030 ng/mL Final     No components found for: TSH;2    insulin regular, 0-14 Units, Subcutaneous, Q6H  sodium chloride, 10 mL, Intravenous, Q12H      heparin (porcine), 18 Units/kg/hr, Last Rate: 12 Units/kg/hr (11/03/21 0907)    Diet Regular; Consistent Carbohydrate, Cardiac      Assessment/Plan      Active Hospital Problems    Diagnosis  POA   • **Syncope [R55]  Yes   • Left hip pain [M25.552]  Yes   •  Carpal tunnel syndrome, bilateral [G56.03]  Yes   • Bilateral pulmonary embolism (HCC) [I26.99]  Yes   • Sleep apnea [G47.30]  Yes   • Hypertension [I10]  Yes   • GERD (gastroesophageal reflux disease) [K21.9]  Yes   • Elevated cholesterol [E78.00]  Yes   • Type 2 diabetes mellitus, with long-term current use of insulin (HCC) [E11.9, Z79.4]  Not Applicable   • DINAH (acute kidney injury) (HCC) [N17.9]  Yes      Resolved Hospital Problems   No resolved problems to display.     70 yo with history of morbid obesity who presents with syncope, DINAH, and submassive pulmonary embolism with respiratory failure. She also had respiratory failure and hypotension.  She was transferred to the ICU and underwent aspiration thrombectomy with cardiology.  Not much change in her pulmonary pressures afterwards.    -Continue heparin drip.  Monitor PTT and labs closely  -Transfer out of the ICU today  -Monitor oxygen, blood pressure closely  -Still awaiting home medications to be reconciled  -SSI, monitor BG, Add lantus. A1c 9.2 but do not know home meds yet    Thanks to specialists      staff          Harrison Newell MD  [facility]  11/03/21  11:11 EDT

## 2021-11-03 NOTE — PROGRESS NOTES
[facility] Progress Note       Encounter Date:21  Patient:Grace Renee  :1950  MRN:[id number]      Chief Complaint: Follow-up pulmonary emboli      Subjective:        Patient states that she is feeling much better compared to yesterday.  Figure-of-eight suture removed by myself this morning    Review of Systems:  Review of Systems   Constitutional: Positive for malaise/fatigue.   Cardiovascular: Negative for chest pain.   Respiratory: Positive for cough and shortness of breath.        Medications:  Scheduled Meds:  insulin regular, 0-14 Units, Subcutaneous, Q6H  sodium chloride, 10 mL, Intravenous, Q12H    Continuous Infusions:  heparin (porcine), 18 Units/kg/hr, Last Rate: 12 Units/kg/hr (21 0907)    PRN Meds:  •  acetaminophen  •  dextrose  •  dextrose  •  dextrose  •  dextrose  •  glucagon (human recombinant)  •  glucagon (human recombinant)  •  heparin (porcine)  •  influenza vaccine  •  Morphine  •  nitroglycerin  •  ondansetron **OR** ondansetron  •  [COMPLETED] Insert peripheral IV **AND** sodium chloride  •  sodium chloride  •  sodium chloride         Objective:       Vitals:    21 0600 21 0700 21 0800 21 0900   BP: 139/81 118/53 126/68 142/81   BP Location: Right arm      Patient Position: Lying      Pulse: 82 80 79 88   Resp: 17      Temp:       TempSrc:       SpO2: 98% 98% 100% 96%   Weight:       Height:               Physical Exam:  Constitutional: Well appearing, well developed, no acute distress   HENT: Oropharynx clear and membrane moist  Eyes: Normal conjunctiva, no sclera icterus.  Neck: Supple, no carotid bruit bilaterally.  Cardiovascular: Regular rate and rhythm, No Murmur, No bilateral lower extremity edema.  Pulmonary: Normal respiratory effort, normal lung sounds, no wheezing.  Abdominal: Soft, nontender, no hepatosplenomegaly, liver is non-pulsatile.  Neurological: Alert and orient x 3.   Skin: Warm, dry, no ecchymosis, no  rash.  Right groin site no hematoma and soft figure-of-eight suture removed  Psych: Appropriate mood and affect. Normal judgment and insight.           Lab Review:   Results from last 7 days   Lab Units 11/02/21  0541 11/01/21 1914   SODIUM mmol/L 141 134*   POTASSIUM mmol/L 4.1 4.7   CHLORIDE mmol/L 102 97*   CO2 mmol/L 27.5 22.1   BUN mg/dL 27* 31*   CREATININE mg/dL 1.38* 1.61*   GLUCOSE mg/dL 136* 451*   CALCIUM mg/dL 9.6 9.4   AST (SGOT) U/L 41* 57*   ALT (SGPT) U/L 33 37*     Results from last 7 days   Lab Units 11/02/21  0541 11/01/21  2148 11/01/21 1914   TROPONIN T ng/mL 0.249* 0.185* 0.033*     Results from last 7 days   Lab Units 11/03/21  0522 11/02/21  1541 11/02/21  1432 11/02/21  1342 11/02/21  0541 11/01/21 1914   WBC 10*3/mm3 10.38 11.11*  --   --  11.86* 12.92*   HEMOGLOBIN g/dL 9.6* 10.4*  --   --  11.8* 12.5   HEMOGLOBIN, POC g/dL  --   --  11.6* 11.6*  --   --    HEMATOCRIT % 30.7* 32.4*  --   --  35.6 39.0   HEMATOCRIT POC %  --   --  34* 34*  --   --    PLATELETS 10*3/mm3 205 216  --   --  253 225     Results from last 7 days   Lab Units 11/03/21  0717 11/03/21  0522 11/02/21  2216 11/02/21  1541   INR   --   --   --  1.46*   APTT seconds 138.0* >200.0* >200.0*  --                Invalid input(s): LDLCALC  Results from last 7 days   Lab Units 11/01/21 1914   PROBNP pg/mL 99.8           Echocardiogram 11/2/2021 with images reviewed myself:  · Left ventricular wall thickness is consistent with moderate concentric hypertrophy.  · Estimated right ventricular systolic pressure from tricuspid regurgitation is normal (<35 mmHg).  · Left ventricular diastolic function is consistent with (grade I) impaired relaxation.  · Calculated left ventricular EF = 84.7% Estimated left ventricular EF was in agreement with the calculated left ventricular EF. Left ventricular systolic function is hyperdynamic (EF > 70%).    Right heart catheterization with pulmonary angiography and thrombectomy  11/2/2021:  · Severe pulmonary hypertension with mean pulmonary artery pressures of 42 mmHg with decompensated state with elevated right atrial pressure of 27 mmHg.  · Reduced cardiac output and index of 3.8 L/min and 6.4 L/min/m2  · Aspiration and thrombectomy performed in right and left pulmonary arteries.  Successful removal of grade 2 clot score from right pulmonary artery and a grade 1 clot score from the left.  · Unchanged hemodynamics with continued severe pulmonary pretension with a mean PA 42 mmHg with elevated right atrial pressures and moderately reduced cardiac output and index of 4.1 L/min and 1.8 L/min/m2         Assessment:          Diagnosis Plan   1. Syncope, unspecified syncope type     2. Elevated troponin            Plan:       Ms. Renee is a 71 y.o. woman with past medical history notable for morbid obesity and chronic low back pain who presented to the emergency room yesterday with a syncopal spell and was noted to be hypoxic.  Initial work-up was limited due to her chronic kidney disease and CTA was deferred.  She was given Lovenox with plans for VQ scan this morning but unfortunately she was also noted to have a mildly elevated troponin which continued to rise the patient became somewhat hypotensive this morning.  A stat CTA was performed and confirmed large bilateral pulmonary emboli with evidence of right ventricular strain.  Patient's clinical status actually worsened and she was transferred to the intensive care unit and eventually transferred to the Cath Lab for urgent pulmonary angiography with possible aspiration thrombectomy.  She underwent aspiration thrombectomy with moderate clot burden removal but really no change in pulmonary pressures.  This is more consistent with an acute on chronic presentation.  Furthermore when I talked with her further she is really been having a persistent cough for almost 2 months my guess is that her clots have been there since then.  Given that she  did not have much in the way of any changes in hemodynamics would like to monitor on heparin today.  Assuming she continues to do well we can transition over to oral anticoagulants tomorrow.    Pulmonary emboli with cor pulmonale:  · Continue anticoagulation and would continue IV heparin today and will monitor clinical course with likely transition to oral anticoagulation tomorrow as long she continues to do well  · Presentation more likely acute on chronic and residual clot burden likely represents chronic clot based upon angiographic appearance and the type of clot removed  · Will need follow-up on echocardiogram in 30 days to see if any improvement.               Chris Pritchett MD  [facility]  11/03/21  09:53 EDT

## 2021-11-03 NOTE — RESEARCH
Research study: [facility] [facility] for Patient Safety and Hemodynamics (FLASH)  Subject study ID#: [id number]  Subject initials: OUSMANE ROMEO presented to [facility] on 11/1/21 after c/o SOA and having a syncopal episode at a local [facility]. She was diagnosed with pulmonary embolism and treated with pulmonary thrombectomy procedure in the cath lab on 11/2/21 by Dr Pritchett. OUSMANE meets all inclusion and no exclusion criteria for participation in the FLASH study. Per study protocol which allows study consent post procedure, I approached her this morning (11/3/21) in her hospital room in [facility] to describe the study and assess her interest in participating.      I summarized the purpose of the study as data collection to review outcomes among people who are treated with thrombectomy+anticoagulation as well as those who are treated with anticoagulation meds only, depending on the recommendation of the physician. I assured her that participation was completely voluntary and she was under no obligation to participate. OUSMANE said she was interested and would like to hear more.       I said that the study data was mostly collected by extraction from her medical record but also involved a few questions to her now and during the 6-month follow up period as well as two 6-minute walk tests in the follow up period. OUSMANE would see Dr Pritchett in about 1 month and in 6 months per normal standard of care. She would also have echocardiograms at both of those time points which the study would pay for. We would see her at those same appointments for about 20 minutes to record the walk tests and questionnaires so there are no special study visits.      I again emphasized to OUSMANE that her participation in FLASH is voluntary and would not impact her care with our physicians and staff should she decide not to participate or should she enroll but then decide to withdraw at some point. In addition, I emphasized that FLASH  was not a treatment study. There was no direct benefit to her for participating - but the data collected may help people in the future with blood clots.       We reviewed the study consent form page by page and I allowed adequate time for questions. We   reviewed the risks, benefits, alternatives, cost to participate (none), her right to withdraw at any time, measures we take to protect her identity and maintain confidentiality, authorization to use and disclose health information, and who to call with questions. OUSMANE verbalized understanding all these areas and she initialed and signed where indicated on the study consent form.      I told OUSMANE that I needed Dr Brush or Dr Pritchett to also sign the consent form to acknowledge her enrollment and then I could make a photocopy for her. I said I anticipated being able to provide it at her first follow up visit if not earlier.  OUSMANE was agreeable to this plan. I gave her my business card with direct telephone number. I also pointed out that Dr Pritchett's office address was the same as [facility].       I thanked OUSMANE for enrolling and then asked a few questions related to the study.      [facility]'s discharge papers will need to include a 1-month follow up appointment with Dr Pritchett as well as an echocardiogram preferably scheduled on the same day just prior to the MD appointment. I will speak with the [facility] rounding nurse to help get this arranged.      Chelsea CHRISTOPHER RN  Clinical Research Coordinator

## 2021-11-04 LAB
APTT PPP: 88.3 SECONDS (ref 22.7–35.4)
BASOPHILS # BLD AUTO: 0.1 10*3/MM3 (ref 0–0.2)
BASOPHILS NFR BLD AUTO: 1 % (ref 0–1.5)
DEPRECATED RDW RBC AUTO: 39.7 FL (ref 37–54)
EOSINOPHIL # BLD AUTO: 0.72 10*3/MM3 (ref 0–0.4)
EOSINOPHIL NFR BLD AUTO: 7.3 % (ref 0.3–6.2)
ERYTHROCYTE [DISTWIDTH] IN BLOOD BY AUTOMATED COUNT: 12.5 % (ref 12.3–15.4)
GLUCOSE BLDC GLUCOMTR-MCNC: 177 MG/DL (ref 70–130)
GLUCOSE BLDC GLUCOMTR-MCNC: 191 MG/DL (ref 70–130)
GLUCOSE BLDC GLUCOMTR-MCNC: 279 MG/DL (ref 70–130)
GLUCOSE BLDC GLUCOMTR-MCNC: 304 MG/DL (ref 70–130)
GLUCOSE BLDC GLUCOMTR-MCNC: 355 MG/DL (ref 70–130)
HCT VFR BLD AUTO: 30.9 % (ref 34–46.6)
HGB BLD-MCNC: 9.8 G/DL (ref 12–15.9)
IMM GRANULOCYTES # BLD AUTO: 0.05 10*3/MM3 (ref 0–0.05)
IMM GRANULOCYTES NFR BLD AUTO: 0.5 % (ref 0–0.5)
INR PPP: 1.12 (ref 0.9–1.1)
LYMPHOCYTES # BLD AUTO: 2.84 10*3/MM3 (ref 0.7–3.1)
LYMPHOCYTES NFR BLD AUTO: 28.8 % (ref 19.6–45.3)
MCH RBC QN AUTO: 27.9 PG (ref 26.6–33)
MCHC RBC AUTO-ENTMCNC: 31.7 G/DL (ref 31.5–35.7)
MCV RBC AUTO: 88 FL (ref 79–97)
MONOCYTES # BLD AUTO: 0.93 10*3/MM3 (ref 0.1–0.9)
MONOCYTES NFR BLD AUTO: 9.4 % (ref 5–12)
NEUTROPHILS NFR BLD AUTO: 5.23 10*3/MM3 (ref 1.7–7)
NEUTROPHILS NFR BLD AUTO: 53 % (ref 42.7–76)
NRBC BLD AUTO-RTO: 0.1 /100 WBC (ref 0–0.2)
PLATELET # BLD AUTO: 205 10*3/MM3 (ref 140–450)
PMV BLD AUTO: 10.7 FL (ref 6–12)
PROTHROMBIN TIME: 14.3 SECONDS (ref 11.7–14.2)
RBC # BLD AUTO: 3.51 10*6/MM3 (ref 3.77–5.28)
WBC # BLD AUTO: 9.87 10*3/MM3 (ref 3.4–10.8)

## 2021-11-04 PROCEDURE — 94799 UNLISTED PULMONARY SVC/PX: CPT

## 2021-11-04 PROCEDURE — 25010000002 MORPHINE PER 10 MG: Performed by: INTERNAL MEDICINE

## 2021-11-04 PROCEDURE — 82962 GLUCOSE BLOOD TEST: CPT

## 2021-11-04 PROCEDURE — 63710000001 INSULIN LISPRO (HUMAN) PER 5 UNITS: Performed by: INTERNAL MEDICINE

## 2021-11-04 PROCEDURE — 99232 SBSQ HOSP IP/OBS MODERATE 35: CPT | Performed by: INTERNAL MEDICINE

## 2021-11-04 PROCEDURE — 97162 PT EVAL MOD COMPLEX 30 MIN: CPT

## 2021-11-04 PROCEDURE — 85730 THROMBOPLASTIN TIME PARTIAL: CPT | Performed by: INTERNAL MEDICINE

## 2021-11-04 PROCEDURE — 25010000002 HEPARIN (PORCINE) PER 1000 UNITS: Performed by: INTERNAL MEDICINE

## 2021-11-04 PROCEDURE — 97530 THERAPEUTIC ACTIVITIES: CPT

## 2021-11-04 PROCEDURE — 85025 COMPLETE CBC W/AUTO DIFF WBC: CPT | Performed by: INTERNAL MEDICINE

## 2021-11-04 PROCEDURE — 63710000001 INSULIN GLARGINE PER 5 UNITS: Performed by: INTERNAL MEDICINE

## 2021-11-04 PROCEDURE — 85610 PROTHROMBIN TIME: CPT | Performed by: INTERNAL MEDICINE

## 2021-11-04 RX ORDER — GABAPENTIN 300 MG/1
300 CAPSULE ORAL 2 TIMES DAILY
Status: DISCONTINUED | OUTPATIENT
Start: 2021-11-04 | End: 2021-11-11 | Stop reason: HOSPADM

## 2021-11-04 RX ORDER — AMLODIPINE BESYLATE 5 MG/1
5 TABLET ORAL NIGHTLY
Status: DISCONTINUED | OUTPATIENT
Start: 2021-11-04 | End: 2021-11-06

## 2021-11-04 RX ORDER — WARFARIN SODIUM 5 MG/1
5 TABLET ORAL
Status: DISCONTINUED | OUTPATIENT
Start: 2021-11-04 | End: 2021-11-06 | Stop reason: DRUGHIGH

## 2021-11-04 RX ORDER — URSODIOL 300 MG/1
300 CAPSULE ORAL 2 TIMES DAILY
Status: DISCONTINUED | OUTPATIENT
Start: 2021-11-04 | End: 2021-11-11 | Stop reason: HOSPADM

## 2021-11-04 RX ORDER — PANTOPRAZOLE SODIUM 40 MG/1
40 TABLET, DELAYED RELEASE ORAL EVERY MORNING
Status: DISCONTINUED | OUTPATIENT
Start: 2021-11-05 | End: 2021-11-06

## 2021-11-04 RX ORDER — MELATONIN
2000 DAILY
Status: DISCONTINUED | OUTPATIENT
Start: 2021-11-04 | End: 2021-11-11 | Stop reason: HOSPADM

## 2021-11-04 RX ORDER — APIXABAN 5 MG (74)
5 KIT ORAL TAKE AS DIRECTED
Qty: 74 TABLET | Refills: 0 | Status: SHIPPED | OUTPATIENT
Start: 2021-11-04 | End: 2021-11-10 | Stop reason: HOSPADM

## 2021-11-04 RX ORDER — HYDROCHLOROTHIAZIDE 12.5 MG/1
12.5 CAPSULE, GELATIN COATED ORAL DAILY
Status: DISCONTINUED | OUTPATIENT
Start: 2021-11-04 | End: 2021-11-06

## 2021-11-04 RX ORDER — ALLOPURINOL 100 MG/1
100 TABLET ORAL NIGHTLY
Status: DISCONTINUED | OUTPATIENT
Start: 2021-11-04 | End: 2021-11-11 | Stop reason: HOSPADM

## 2021-11-04 RX ADMIN — GABAPENTIN 300 MG: 300 CAPSULE ORAL at 20:48

## 2021-11-04 RX ADMIN — GABAPENTIN 300 MG: 300 CAPSULE ORAL at 13:07

## 2021-11-04 RX ADMIN — SODIUM CHLORIDE, PRESERVATIVE FREE 10 ML: 5 INJECTION INTRAVENOUS at 20:49

## 2021-11-04 RX ADMIN — INSULIN LISPRO 3 UNITS: 100 INJECTION, SOLUTION INTRAVENOUS; SUBCUTANEOUS at 09:03

## 2021-11-04 RX ADMIN — ALLOPURINOL 100 MG: 100 TABLET ORAL at 22:10

## 2021-11-04 RX ADMIN — SODIUM CHLORIDE, PRESERVATIVE FREE 10 ML: 5 INJECTION INTRAVENOUS at 02:02

## 2021-11-04 RX ADMIN — URSODIOL 300 MG: 300 CAPSULE ORAL at 20:48

## 2021-11-04 RX ADMIN — Medication 2000 UNITS: at 16:58

## 2021-11-04 RX ADMIN — HYDROCHLOROTHIAZIDE 12.5 MG: 12.5 CAPSULE ORAL at 16:58

## 2021-11-04 RX ADMIN — HEPARIN SODIUM 12 UNITS/KG/HR: 10000 INJECTION, SOLUTION INTRAVENOUS at 01:55

## 2021-11-04 RX ADMIN — INSULIN GLARGINE 15 UNITS: 100 INJECTION, SOLUTION SUBCUTANEOUS at 09:03

## 2021-11-04 RX ADMIN — HEPARIN SODIUM 12 UNITS/KG/HR: 10000 INJECTION, SOLUTION INTRAVENOUS at 20:51

## 2021-11-04 RX ADMIN — INSULIN LISPRO 3 UNITS: 100 INJECTION, SOLUTION INTRAVENOUS; SUBCUTANEOUS at 12:58

## 2021-11-04 RX ADMIN — WARFARIN SODIUM 5 MG: 5 TABLET ORAL at 17:01

## 2021-11-04 RX ADMIN — MORPHINE SULFATE 2 MG: 2 INJECTION, SOLUTION INTRAMUSCULAR; INTRAVENOUS at 20:49

## 2021-11-04 RX ADMIN — MORPHINE SULFATE 2 MG: 2 INJECTION, SOLUTION INTRAMUSCULAR; INTRAVENOUS at 02:07

## 2021-11-04 RX ADMIN — AMLODIPINE BESYLATE 5 MG: 5 TABLET ORAL at 22:10

## 2021-11-04 RX ADMIN — INSULIN LISPRO 10 UNITS: 100 INJECTION, SOLUTION INTRAVENOUS; SUBCUTANEOUS at 16:58

## 2021-11-04 NOTE — PROGRESS NOTES
"      [facility]         Dr Vargas Sayied   LOS: 2 days   Patient Care Team:  Jani Sun MD as PCP - General (Family Medicine)    Chief Complaint: Acute submassive PE with morbid obesity CONNOR recent hip pain and surgery.    Interval History: Sitting up in a chair on nasal cannula oxygen.  Feels much better this morning.    REVIEW OF SYSTEMS:   CARDIOVASCULAR: No chest pain, chest pressure or chest discomfort. No palpitations or edema.   RESPIRATORY: Shortness of breath with activity.  GASTROINTESTINAL: No anorexia, nausea, vomiting or diarrhea. No abdominal pain or blood.   HEMATOLOGIC: No bleeding or bruising.     Ventilator/Non-Invasive Ventilation Settings (From admission, onward)            None            Vital Signs  Temp:  [97.5 °F (36.4 °C)-98.2 °F (36.8 °C)] 98 °F (36.7 °C)  Heart Rate:  [76-93] 88  Resp:  [11-18] 18  BP: (103-171)/(52-91) 129/61    Intake/Output Summary (Last 24 hours) at 11/4/2021 1300  Last data filed at 11/4/2021 0830  Gross per 24 hour   Intake 1060.12 ml   Output 1000 ml   Net 60.12 ml     Flowsheet Rows      First Filed Value   Admission Height 170.2 cm (67\") Documented at 11/01/2021 2221   Admission Weight 127 kg (280 lb) Documented at 11/01/2021 2221          Physical Exam:  Patient is examined using the personal protective equipment as per guidelines from infection control for this particular patient as enacted.  Hand hygiene was performed before and after patient interaction.   General Appearance:    Alert, cooperative, in no acute distress.  Following simple commands  Neck midline trachea, no thyromegaly   Lungs:    Diminished breath sounds in the bases    Heart:    Regular rhythm and normal rate, normal S1 and S2, no            murmur, no gallop, no rub, no click   Chest Wall:    No abnormalities observed   Abdomen:     Normal bowel sounds, no masses, no organomegaly, soft        nontender, nondistended, no guarding, no rebound                tenderness "   Extremities:   Moves all extremities well, no edema, no cyanosis, no             redness  CNS no focal neurological deficits normal sensory exam  Skin no rashes no nodules  Musculoskeletal no cyanosis no clubbing normal range of motion     Results Review:        Results from last 7 days   Lab Units 11/02/21  0541 11/01/21 1914 11/01/21 1914   SODIUM mmol/L 141  --  134*   POTASSIUM mmol/L 4.1  --  4.7   CHLORIDE mmol/L 102  --  97*   CO2 mmol/L 27.5  --  22.1   BUN mg/dL 27*  --  31*   CREATININE mg/dL 1.38*  --  1.61*   GLUCOSE mg/dL 136*   < > 451*   CALCIUM mg/dL 9.6  --  9.4    < > = values in this interval not displayed.     Results from last 7 days   Lab Units 11/02/21  0541 11/01/21  2148 11/01/21 1914   TROPONIN T ng/mL 0.249* 0.185* 0.033*     Results from last 7 days   Lab Units 11/04/21  0546 11/03/21  0522 11/02/21  1541   WBC 10*3/mm3 9.87 10.38 11.11*   HEMOGLOBIN g/dL 9.8* 9.6* 10.4*   HEMATOCRIT % 30.9* 30.7* 32.4*   PLATELETS 10*3/mm3 205 205 216     Results from last 7 days   Lab Units 11/04/21  0546 11/03/21  1447 11/03/21  0717 11/02/21  2216 11/02/21  1541   INR   --   --   --   --  1.46*   APTT seconds 88.3* 87.9* 138.0*   < >  --     < > = values in this interval not displayed.                       I reviewed the patient's new clinical results.  I personally viewed and interpreted the patient's chest x-ray.        Medication Review:   allopurinol, 100 mg, Oral, Nightly  amLODIPine, 5 mg, Oral, Nightly  cholecalciferol, 2,000 Units, Oral, Daily  gabapentin, 300 mg, Oral, BID  hydroCHLOROthiazide Oral, 12.5 mg, Oral, Daily  insulin glargine, 15 Units, Subcutaneous, QAM  insulin lispro, 0-14 Units, Subcutaneous, TID AC  [START ON 11/5/2021] pantoprazole, 40 mg, Oral, QAM  sodium chloride, 10 mL, Intravenous, Q12H  ursodiol, 300 mg, Oral, BID        heparin (porcine), 18 Units/kg/hr, Last Rate: 12 Units/kg/hr (11/04/21 9275)        ASSESSMENT:   Syncope  Acute submassive PE status post  mechanical thrombectomy  CONNOR intolerant to CPAP  Hypertension  GERD  Morbid obesity  Type 2 diabetes mellitus  Acute kidney injury    PLAN:  Much improved post mechanical thrombectomy.  Heparin drip possibly could be transitioned to NOAC once okay with cardiology.  Home CPAP okay to use  Oxygen to keep sats above 90%  PT OT  Doppler lower extremity still pending  Mobilize ambulate  Awaiting bed to transfer out of the ICU  Hospitalist currently following on the floor        Alicia Clayton MD  11/04/21  13:00 EDT

## 2021-11-04 NOTE — PROGRESS NOTES
Discharge Planning Assessment  [facility]     Patient Name: Grace Renee  MRN: [id number]  Today's Date: 11/4/2021    Admit Date: 11/1/2021     Discharge Needs Assessment    No documentation.                Discharge Plan     Row Name 11/04/21 1617       Plan    Plan Home with family    Plan Comments Checked price for Eliquis with [facility] cost will be 129.00 per month, spoke with patient at bedside regarding price she stated she would not be able to afford 129.00 per month. Mike ORTIZ              Continued Care and Services - Admitted Since 11/1/2021    Coordination has not been started for this encounter.       Expected Discharge Date and Time     Expected Discharge Date Expected Discharge Time    Nov 5, 2021          Demographic Summary    No documentation.                Functional Status    No documentation.                Psychosocial    No documentation.                Abuse/Neglect    No documentation.                Legal    No documentation.                Substance Abuse    No documentation.                Patient Forms    No documentation.                   Rashmi Contreras, RN

## 2021-11-04 NOTE — PROGRESS NOTES
[facility] Follow Up    Chief Complaint: Follow up pulmonary embolism    Interval History: Reports some chest discomfort with deep breaths but this is improving.  Is unclear if her breathing overall is improving because she complains of upper airway congestion including nasal congestion.  She continues to have a cough which she reports is productive with yellow sputum.    Objective:     Objective:  Temp:  [97.5 °F (36.4 °C)-98.6 °F (37 °C)] 98 °F (36.7 °C)  Heart Rate:  [79-93] 79  Resp:  [11] 11  BP: (103-171)/() 123/70     Intake/Output Summary (Last 24 hours) at 11/4/2021 0820  Last data filed at 11/4/2021 0400  Gross per 24 hour   Intake 1000.12 ml   Output 1000 ml   Net 0.12 ml     Body mass index is 43.85 kg/m².      11/01/21  2221 11/02/21  1609   Weight: 127 kg (280 lb) 127 kg (280 lb)     Weight change:       Physical Exam:   General : Alert, cooperative, in no acute distress.  Neuro: Alert,cooperative and oriented.  Lungs: CTAB. Normal respiratory effort and rate.  CV: Regular rate and rhythm, normal S1 and S2, no murmurs, gallops or rubs.  ABD: Soft, nontender, nondistended. Positive bowel sounds.  Extr: No edema or cyanosis, moves all extremities.    Lab Review:   Results from last 7 days   Lab Units 11/02/21  0541 11/01/21 1914   SODIUM mmol/L 141 134*   POTASSIUM mmol/L 4.1 4.7   CHLORIDE mmol/L 102 97*   CO2 mmol/L 27.5 22.1   BUN mg/dL 27* 31*   CREATININE mg/dL 1.38* 1.61*   GLUCOSE mg/dL 136* 451*   CALCIUM mg/dL 9.6 9.4   AST (SGOT) U/L 41* 57*   ALT (SGPT) U/L 33 37*     Results from last 7 days   Lab Units 11/02/21  0541 11/01/21 2148 11/01/21 1914   TROPONIN T ng/mL 0.249* 0.185* 0.033*     Results from last 7 days   Lab Units 11/04/21  0546 11/03/21  0522   WBC 10*3/mm3 9.87 10.38   HEMOGLOBIN g/dL 9.8* 9.6*   HEMATOCRIT % 30.9* 30.7*   PLATELETS 10*3/mm3 205 205     Results from last 7 days   Lab Units 11/04/21  0546 11/03/21  1447 11/02/21  2215 11/02/21  1543    INR   --   --   --  1.46*   APTT seconds 88.3* 87.9*   < >  --     < > = values in this interval not displayed.               Invalid input(s): LDLCALC  Results from last 7 days   Lab Units 11/01/21  1914   PROBNP pg/mL 99.8         I reviewed the patient's new clinical results.  I personally viewed and interpreted the patient's EKG  Current Medications:   Scheduled Meds:insulin glargine, 15 Units, Subcutaneous, QAM  insulin lispro, 0-14 Units, Subcutaneous, TID AC  sodium chloride, 10 mL, Intravenous, Q12H      Continuous Infusions:heparin (porcine), 18 Units/kg/hr, Last Rate: 12 Units/kg/hr (11/04/21 0155)        Allergies:  No Known Allergies    Assessment/Plan:     1. Bilateral pulmonary embolism with cor pulmonale.  Suspected to be acute on chronic.  Status post thrombectomy with moderate amount of thrombus removed.  Remains on heparin gtt.  BP improved.  Heart rates normal.   2. Syncope.  Due to #1.   3. DINAH on CKD.  Improving.   4. Hypertension  5. CONNOR. Intolerant to CPAP.   6. Diabetes mellitus type 2  7. Morbid obesity  8. Anemia.  Post-procedure decline.  Stable since then.     -She appears largely stable at this point.  I think we can try and transition her to oral anticoagulation.  We will check see if one of the DOAC's are an option in terms of cost.  If it does not we may need to consider warfarin she want to have any trouble remaining on anticoagulation long-term.    Sierra Brush MD  11/04/21  08:20 EDT     ADDENDUM:  Both eliquis and xarelto are cost prohibitive for the patient.  Will start warfarin.  Continue heparin infusion until INR greater than 2.

## 2021-11-04 NOTE — PROGRESS NOTES
Name: Grace Renee ADMIT: 2021   : 1950  PCP: Jani Sun MD    MRN: [id number] LOS: 2 days   AGE/SEX: 71 y.o. female  ROOM: [facility]   Subjective   Chief Complaint   Patient presents with   • Shortness of Breath   • Syncope       Had CT with +PE  S/p thrombectomy  On heparin gtt  Dyspnea better  Still on some oxygen  Overall with generalized weakness and fatigue      ROS  No f/c  No n/v  +cp/palp  +soa/cough    Objective   Vital Signs  Temp:  [97.5 °F (36.4 °C)-98.2 °F (36.8 °C)] 98 °F (36.7 °C)  Heart Rate:  [76-93] 76  Resp:  [11] 11  BP: (103-171)/(52-91) 129/61  SpO2:  [90 %-100 %] 100 %  on  Flow (L/min):  [2] 2;   Device (Oxygen Therapy): nasal cannula  Body mass index is 43.85 kg/m².    Physical Exam  Constitutional:       Appearance: She is obese. She is ill-appearing.   HENT:      Head: Normocephalic and atraumatic.   Eyes:      General:         Right eye: No discharge.         Left eye: No discharge.   Cardiovascular:      Rate and Rhythm: Normal rate and regular rhythm.   Pulmonary:      Effort: Respiratory distress present.      Breath sounds: No wheezing.   Abdominal:      Palpations: Abdomen is soft.   Musculoskeletal:      Right lower leg: Edema present.      Left lower leg: Edema present.   Neurological:      Mental Status: She is alert.   Psychiatric:         Mood and Affect: Mood normal.         Thought Content: Thought content normal.         Judgment: Judgment normal.         Results Review:       I reviewed the patient's new clinical results.  Results from last 7 days   Lab Units 21  0546 21  0522 21  1541 21  1432 21  1342 21  0541   WBC 10*3/mm3 9.87 10.38 11.11*  --   --  11.86*   HEMOGLOBIN g/dL 9.8* 9.6* 10.4*  --   --  11.8*   HEMOGLOBIN, POC g/dL  --   --   --  11.6*   < >  --    PLATELETS 10*3/mm3 205 205 216  --   --  253    < > = values in this interval not displayed.     Results from last 7 days   Lab Units 21  0511  11/01/21 1914   SODIUM mmol/L 141 134*   POTASSIUM mmol/L 4.1 4.7   CHLORIDE mmol/L 102 97*   CO2 mmol/L 27.5 22.1   BUN mg/dL 27* 31*   CREATININE mg/dL 1.38* 1.61*   GLUCOSE mg/dL 136* 451*   Estimated Creatinine Clearance: 52.6 mL/min (A) (by C-G formula based on SCr of 1.38 mg/dL (H)).  Results from last 7 days   Lab Units 11/02/21  0541 11/01/21  1914   ALBUMIN g/dL 3.90 4.10   BILIRUBIN mg/dL 0.3 0.4   ALK PHOS U/L 97 101   AST (SGOT) U/L 41* 57*   ALT (SGPT) U/L 33 37*     Results from last 7 days   Lab Units 11/02/21  0541 11/01/21 1914   CALCIUM mg/dL 9.6 9.4   ALBUMIN g/dL 3.90 4.10         Coag   Results from last 7 days   Lab Units 11/04/21  0546 11/03/21  1447 11/03/21  0717 11/03/21  0522 11/02/21  2216 11/02/21  1541   INR   --   --   --   --   --  1.46*   APTT seconds 88.3* 87.9* 138.0* >200.0* >200.0*  --      HbA1C   Lab Results   Component Value Date    HGBA1C 9.27 (H) 11/02/2021     Infection     Radiology(recent) No radiology results for the last day  Troponin T   Date Value Ref Range Status   11/02/2021 0.249 (C) 0.000 - 0.030 ng/mL Final     No components found for: TSH;2    insulin glargine, 15 Units, Subcutaneous, QAM  insulin lispro, 0-14 Units, Subcutaneous, TID AC  sodium chloride, 10 mL, Intravenous, Q12H      heparin (porcine), 18 Units/kg/hr, Last Rate: 12 Units/kg/hr (11/04/21 0155)    Diet Regular; Consistent Carbohydrate, Cardiac      Assessment/Plan      Active Hospital Problems    Diagnosis  POA   • **Bilateral pulmonary embolism (HCC) [I26.99]  Yes   • Acute respiratory failure with hypoxia (HCC) [J96.01]  Unknown   • Left hip pain [M25.552]  Yes   • Carpal tunnel syndrome, bilateral [G56.03]  Yes   • Sleep apnea [G47.30]  Yes   • Hypertension [I10]  Yes   • GERD (gastroesophageal reflux disease) [K21.9]  Yes   • Elevated cholesterol [E78.00]  Yes   • Type 2 diabetes mellitus, with long-term current use of insulin (HCC) [E11.9, Z79.4]  Not Applicable   • DINAH (acute kidney  injury) (HCC) [N17.9]  Yes   • Syncope [R55]  Yes      Resolved Hospital Problems   No resolved problems to display.     72 yo with history of morbid obesity who presents with syncope, DINAH, and submassive pulmonary embolism with respiratory failure. She also had respiratory failure and hypotension.  She was transferred to the ICU and underwent aspiration thrombectomy with cardiology.  Not much change in her pulmonary pressures afterwards.    -Continue heparin drip.  Monitor PTT and labs closely (high risk medication). Oral agent either today or tomorrow  -Transfer out of the ICU when bed available  -Monitor oxygen, blood pressure closely  -Restart many of home medications. Can add back low dose hctz but hold lisinopril and nsaid and check BMP in AM. Lower dose of norvasc than normal  -SSI, monitor BG, Added lantus. A1c 9.2 doesn't look to be on any home DM meds will consult DM educator    Thanks to specialists     DW staff    Dispo- hopefully home vs rehab in the next 1-2 days see how things go with rehab          Harrison Newell MD  [facility]  11/04/21  11:11 EDT

## 2021-11-04 NOTE — THERAPY EVALUATION
Patient Name: Grace Renee  : 1950    MRN: [id number]                              Today's Date: 2021       Admit Date: 2021    Visit Dx:     ICD-10-CM ICD-9-CM   1. Syncope, unspecified syncope type  R55 780.2   2. Elevated troponin  R77.8 790.6   3. Bilateral pulmonary embolism (HCC)  I26.99 415.19     Patient Active Problem List   Diagnosis   • Syncope   • Left hip pain   • Carpal tunnel syndrome, bilateral   • Bilateral pulmonary embolism (HCC)   • Sleep apnea   • Hypertension   • GERD (gastroesophageal reflux disease)   • Elevated cholesterol   • Type 2 diabetes mellitus, with long-term current use of insulin (HCC)   • DINAH (acute kidney injury) (HCC)   • Acute respiratory failure with hypoxia (HCC)     Past Medical History:   Diagnosis Date   • Diabetes mellitus (HCC)    • Elevated cholesterol    • GERD (gastroesophageal reflux disease)    • Hypertension    • Sleep apnea      Past Surgical History:   Procedure Laterality Date   • CARDIAC CATHETERIZATION Bilateral 2021    Procedure: Percutaneous Mechanical Thrombectomy- INARI;  Surgeon: Chris Pritchett MD;  Location:  [facility];  Service: Cardiovascular;  Laterality: Bilateral;   • CARDIAC CATHETERIZATION N/A 2021    Procedure: Right Heart Cath;  Surgeon: Chris Pritchett MD;  Location:  [facility];  Service: Cardiovascular;  Laterality: N/A;   • INTERVENTIONAL RADIOLOGY PROCEDURE Bilateral 2021    Procedure: Pulmonary Angiogram;  Surgeon: Chris Pritchett MD;  Location:  [facility];  Service: Cardiovascular;  Laterality: Bilateral;      General Information     Row Name 21 1348          Physical Therapy Time and Intention    Document Type evaluation  -CF     Mode of Treatment individual therapy; physical therapy  -CF     Row Name 21 1348          General Information    Patient Profile Reviewed yes  -CF     Prior Level of Function independent:  denies AD  and falls  -CF     Existing Precautions/Restrictions fall; oxygen therapy device and L/min  -CF     Barriers to Rehab none identified  -     Row Name 11/04/21 1348          Living Environment    Lives With other relative(s)  -     Row Name 11/04/21 1348          Home Main Entrance    Number of Stairs, Main Entrance none  -CF     Row Name 11/04/21 1348          Stairs Within Home, Primary    Number of Stairs, Within Home, Primary none  -CF     Row Name 11/04/21 1348          Cognition    Orientation Status (Cognition) oriented x 4  -CF     Row Name 11/04/21 1348          Safety Issues, Functional Mobility    Impairments Affecting Function (Mobility) endurance/activity tolerance; shortness of breath; strength; balance; pain  -CF           User Key  (r) = Recorded By, (t) = Taken By, (c) = Cosigned By    Initials Name Provider Type    CF Leslie Rogers PT Physical Therapist               Mobility     Row Name 11/04/21 1349          Bed Mobility    Bed Mobility supine-sit  -CF     Supine-Sit [place] (Bed Mobility) standby assist  -CF     Assistive Device (Bed Mobility) bed rails  -CF     Comment (Bed Mobility) difficulty with moving L hip for supine>sit due to pain  -CF     Row Name 11/04/21 1349          Sit-Stand Transfer    Sit-Stand [place] (Transfers) contact guard; verbal cues  -     Row Name 11/04/21 1349          Gait/Stairs (Locomotion)    [place] Level (Gait) contact guard; verbal cues  -CF     Distance in Feet (Gait) 60 then 15' to chair after seated rest  -CF     Deviations/Abnormal Patterns (Gait) neeraj decreased; gait speed decreased; antalgic  -CF     Comment (Gait/Stairs) Slow antalgic pace due to pain, no overt loss of balance noted, no AD used  -CF           User Key  (r) = Recorded By, (t) = Taken By, (c) = Cosigned By    Initials Name Provider Type    CF Leslie Rogers PT Physical Therapist               Obj/Interventions     Row Name 11/04/21 1351          Range of  Motion Comprehensive    General Range of Motion bilateral lower extremity ROM WFL  -CF     Comment, General Range of Motion L hip limited 2/2 pain  -CF     Row Name 11/04/21 1357          Strength Comprehensive (MMT)    General Manual Muscle Testing (MMT) Assessment --  -CF     Comment, General Manual Muscle Testing (MMT) Assessment BLE WFL  -CF     Row Name 11/04/21 1354          Balance    Balance Assessment sitting static balance; sitting dynamic balance; standing static balance; standing dynamic balance  -CF     Static Sitting Balance WFL; unsupported; sitting, edge of bed  -CF     Dynamic Sitting Balance WFL; unsupported; sitting, edge of bed  -CF     Static Standing Balance WFL; unsupported  -CF     Dynamic Standing Balance mild impairment; unsupported  -CF     Row Name 11/04/21 1353          Sensory Assessment (Somatosensory)    Sensory Assessment (Somatosensory) LE sensation intact  -CF           User Key  (r) = Recorded By, (t) = Taken By, (c) = Cosigned By    Initials Name Provider Type    CF Leslie Rogers PT Physical Therapist               Goals/Plan     Row Name 11/04/21 5478          Transfer Goal 1 (PT)    Activity/Assistive Device (Transfer Goal 1, PT) sit-to-stand/stand-to-sit; bed-to-chair/chair-to-bed  -CF     [place] Level/Cues Needed (Transfer Goal 1, PT) modified independence  -CF     Time Frame (Transfer Goal 1, PT) 2 weeks  -CF     Row Name 11/04/21 9897          Gait Training Goal 1 (PT)    Activity/Assistive Device (Gait Training Goal 1, PT) gait (walking locomotion)  -CF     [place] Level (Gait Training Goal 1, PT) supervision required  -CF     Distance (Gait Training Goal 1, PT) 150  -CF     Time Frame (Gait Training Goal 1, PT) 2 weeks  -CF           User Key  (r) = Recorded By, (t) = Taken By, (c) = Cosigned By    Initials Name Provider Type    CF Leslie Rogers PT Physical Therapist               Clinical Impression     Row Name 11/04/21 6080          Pain     Additional Documentation Pain Scale: Numbers Pre/Post-Treatment (Group)  -CF     Row Name 11/04/21 5363          Pain Scale: Numbers Pre/Post-Treatment    Pretreatment Pain Rating 4/10  -CF     Posttreatment Pain Rating 8/10  -CF     Pain Location - Side Left  -CF     Pain Location hip  -CF     Pre/Posttreatment Pain Comment with movement  -CF     Pain Intervention(s) Repositioned; Ambulation/increased activity  -CF     Row Name 11/04/21 0888          Plan of Care Review    Plan of Care Reviewed With patient  -CF     Outcome Summary Pt is a 70 yo female admitted following a syncopal event at [facility]. Pt had been feeling increasingly SOA for several days prior to event and workup revealed B PE. Pt lives with her niece and typically is IND Adls and mobility. Pt able to perform supine>sit with increased time due to L hip pain. Pt transferred and ambulated without AD with cga. No overt loss of balance but antalgic gait noted due to pain. Pt may benefit from skilled PT to address endurance deficits. Anticipate return home with assist once medically cleared.  -     Row Name 11/04/21 1561          Therapy Assessment/Plan (PT)    Rehab Potential (PT) good, to achieve stated therapy goals  -     Criteria for Skilled Interventions Met (PT) yes  -CF     Predicted Duration of Therapy Intervention (PT) 2 weeks  -     Row Name 11/04/21 0885          Vital Signs    Pre SpO2 (%) 93  -CF     O2 Delivery Pre Treatment supplemental O2  2L  -CF     Intra SpO2 (%) 89  -CF     O2 Delivery Intra Treatment supplemental O2  -CF     Post SpO2 (%) 91  -CF     O2 Delivery Post Treatment supplemental O2  -CF     Row Name 11/04/21 7085          Positioning and Restraints    Pre-Treatment Position in bed  -CF     Post Treatment Position chair  -CF     In Chair sitting; call light within reach; encouraged to call for assist; exit alarm on; notified nsg  -CF           User Key  (r) = Recorded By, (t) = Taken By, (c) = Cosigned By    Initials  Name Provider Type    CF Leslie Rogers, MONIQUE Physical Therapist               Outcome Measures     Row Name 11/04/21 1356          How much help from another person do you currently need...    Turning from your back to your side while in flat bed without using bedrails? 4  -CF     Moving from lying on back to sitting on the side of a flat bed without bedrails? 3  -CF     Moving to and from a bed to a chair (including a wheelchair)? 3  -CF     Standing up from a chair using your arms (e.g., wheelchair, bedside chair)? 3  -CF     Climbing 3-5 steps with a railing? 2  -CF     To walk in hospital room? 3  -CF     AM-PAC 6 Clicks Score (PT) 18  -CF     Row Name 11/04/21 1356          Functional Assessment    Outcome Measure Options AM-PAC 6 Clicks Basic Mobility (PT)  -CF           User Key  (r) = Recorded By, (t) = Taken By, (c) = Cosigned By    Initials Name Provider Type    CF Leslie Rogers PT Physical Therapist                             Physical Therapy Education                 Title: PT OT SLP Therapies (Done)     Topic: Physical Therapy (Done)     Point: Mobility training (Done)     Learning Progress Summary           Patient Acceptance, E, VU by CF at 11/4/2021 1357                   Point: Home exercise program (Done)     Learning Progress Summary           Patient Acceptance, E, VU by CF at 11/4/2021 1357                   Point: Body mechanics (Done)     Learning Progress Summary           Patient Acceptance, E, VU by CF at 11/4/2021 1357                   Point: Precautions (Done)     Learning Progress Summary           Patient Acceptance, E, VU by CF at 11/4/2021 1357                               User Key     Initials Effective Dates Name Provider Type Discipline    CF 06/16/21 -  Leslie Rogers, MONIQUE Physical Therapist PT              PT Recommendation and Plan  Planned Therapy Interventions (PT): balance training, bed mobility training, gait training, strengthening, patient/family education,  transfer training, stretching, ROM (range of motion)  Plan of Care Reviewed With: patient  Outcome Summary: Pt is a 70 yo female admitted following a syncopal event at [facility]. Pt had been feeling increasingly SOA for several days prior to event and workup revealed B PE. Pt lives with her niece and typically is IND Adls and mobility. Pt able to perform supine>sit with increased time due to L hip pain. Pt transferred and ambulated without AD with cga. No overt loss of balance but antalgic gait noted due to pain. Pt may benefit from skilled PT to address endurance deficits. Anticipate return home with assist once medically cleared.     Time Calculation:    PT Charges     Row Name 11/04/21 1357             Time Calculation    Start Time 1336  -CF      Stop Time 1359  -CF      Time Calculation (min) 23 min  -CF      PT Received On 11/04/21  -CF      PT - Next Appointment 11/05/21  -CF      PT Goal Re-Cert Due Date 11/18/21  -CF              Time Calculation- PT    Total Timed Code Minutes- PT 16 minute(s)  -CF              Timed Charges    33706 - PT Therapeutic Activity Minutes 16  -CF              Total Minutes    Timed Charges Total Minutes 16  -CF       Total Minutes 16  -CF            User Key  (r) = Recorded By, (t) = Taken By, (c) = Cosigned By    Initials Name Provider Type    CF Leslie Rogers, PT Physical Therapist              Therapy Charges for Today     Code Description Service Date Service Provider Modifiers Qty    12736284157  PT EVAL MOD COMPLEXITY 2 11/4/2021 Leslie Rogers, PT GP 1    [id number] HC PT THERAPEUTIC ACT EA 15 MIN 11/4/2021 Leslie Rogers, PT GP 1          PT G-Codes  Outcome Measure Options: AM-PAC 6 Clicks Basic Mobility (PT)  AM-PAC 6 Clicks Score (PT): 18    Leslie Rogers PT  11/4/2021

## 2021-11-04 NOTE — PLAN OF CARE
Goal Outcome Evaluation:  Plan of Care Reviewed With: patient           Outcome Summary: Pt is a 70 yo female admitted following a syncopal event at [facility]. Pt had been feeling increasingly SOA for several days prior to event and workup revealed B PE. Pt lives with her niece and typically is IND Adls and mobility. Pt able to perform supine>sit with increased time due to L hip pain. Pt transferred and ambulated without AD with cga. No overt loss of balance but antalgic gait noted due to pain. Pt may benefit from skilled PT to address endurance deficits. Anticipate return home with assist once medically cleared.

## 2021-11-04 NOTE — PLAN OF CARE
Problem: Adult Inpatient Plan of Care  Goal: Plan of Care Review  Outcome: Ongoing, Progressing  Goal: Patient-Specific Goal (Individualized)  Outcome: Ongoing, Progressing  Goal: Absence of Hospital-Acquired Illness or Injury  Outcome: Ongoing, Progressing  Intervention: Identify and Manage Fall Risk  Description: Perform standard risk assessment with a validated tool or comprehensive approach appropriate to the patient on admission; reassess fall risk frequently, with change in status or transfer to another level of care.  Communicate fall injury risk to interprofessional healthcare team.  Determine need for increased observation, equipment and environmental modification, such as low bed and signage, as well as supportive, nonskid footwear.  Adjust safety measures to individual developmental age, stage and identified risk factors.  Reinforce the importance of safety and physical activity with patient and family.  Perform regular intentional rounding to assess need for position change, pain assessment, personal needs, including assistance with toileting.  Goal: Optimal Comfort and Wellbeing  Outcome: Ongoing, Progressing  Goal: Readiness for Transition of Care  Outcome: Ongoing, Progressing     Problem: Fall Injury Risk  Goal: Absence of Fall and Fall-Related Injury  Outcome: Ongoing, Progressing  Intervention: Identify and Manage Contributors to Fall Injury Risk  Description: Reassess fall risk frequently and with change in status or transfer to another level of care.  Communicate fall injury risk to all healthcare team members (e.g., rounds, change of shift/provider, patient transport).  Anticipate needs; perform regular intentional rounding to assess need for position change, pain assessment, personal needs (e.g., toileting) and placement of necessary items.  Provide reorientation, appropriate sensory stimulation and routines with changes in mental status to decrease risk of fall.  Promote use of personal  vision and auditory aids (e.g., glasses, hearing aids).  Assess assistance level required for safe and effective care; provide support as needed (e.g., toileting, bathing, mobilization).  Define behavior and activity limits to patient and family.  If fall occurs, assess for and treat injury; determine cause; revise fall injury prevention plan.  Regularly review medication contribution to fall risk; adjust medication administration times to minimize risk of falling.  Consider risk related to polypharmacy and age.  Balance adequate pain management with potential for oversedation.  Intervention: Promote Injury-Free Environment  Description: Provide a safe, barrier-free environment that encourages independent activity.  Keep care area uncluttered and well-lighted.  Determine need for increased observation or auditory alerts (e.g., bed or chair alarm).  Assess equipment and environmental modification needs (e.g., low bed, signage, nonskid footwear, grab bars).  Avoid use of restraints.     Problem: Skin Injury Risk Increased  Goal: Skin Health and Integrity  Outcome: Ongoing, Progressing   Goal Outcome Evaluation:  Plan of Care Reviewed With: patient        Progress: improving  Pt a&o x 4, no c/o pain.  O2 titrated down to 1 L.  Pt ambulating to the bathroom with cane and assist x 1.  No falls, currently stable.

## 2021-11-05 ENCOUNTER — APPOINTMENT (OUTPATIENT)
Dept: GENERAL RADIOLOGY | Facility: HOSPITAL | Age: 71
End: 2021-11-05

## 2021-11-05 PROBLEM — D64.9 ANEMIA: Status: ACTIVE | Noted: 2021-11-05

## 2021-11-05 LAB
ANION GAP SERPL CALCULATED.3IONS-SCNC: 9.1 MMOL/L (ref 5–15)
APTT PPP: 100.7 SECONDS (ref 22.7–35.4)
APTT PPP: 44.2 SECONDS (ref 22.7–35.4)
APTT PPP: 65.4 SECONDS (ref 22.7–35.4)
BASOPHILS # BLD AUTO: 0.09 10*3/MM3 (ref 0–0.2)
BASOPHILS NFR BLD AUTO: 1 % (ref 0–1.5)
BUN SERPL-MCNC: 15 MG/DL (ref 8–23)
BUN/CREAT SERPL: 14.3 (ref 7–25)
CALCIUM SPEC-SCNC: 8.8 MG/DL (ref 8.6–10.5)
CHLORIDE SERPL-SCNC: 99 MMOL/L (ref 98–107)
CO2 SERPL-SCNC: 24.9 MMOL/L (ref 22–29)
CREAT SERPL-MCNC: 1.05 MG/DL (ref 0.57–1)
DEPRECATED RDW RBC AUTO: 40.7 FL (ref 37–54)
EOSINOPHIL # BLD AUTO: 0.61 10*3/MM3 (ref 0–0.4)
EOSINOPHIL NFR BLD AUTO: 6.5 % (ref 0.3–6.2)
ERYTHROCYTE [DISTWIDTH] IN BLOOD BY AUTOMATED COUNT: 12.6 % (ref 12.3–15.4)
GFR SERPL CREATININE-BSD FRML MDRD: 63 ML/MIN/1.73
GLUCOSE BLDC GLUCOMTR-MCNC: 334 MG/DL (ref 70–130)
GLUCOSE BLDC GLUCOMTR-MCNC: 362 MG/DL (ref 70–130)
GLUCOSE BLDC GLUCOMTR-MCNC: 375 MG/DL (ref 70–130)
GLUCOSE BLDC GLUCOMTR-MCNC: 399 MG/DL (ref 70–130)
GLUCOSE SERPL-MCNC: 309 MG/DL (ref 65–99)
HCT VFR BLD AUTO: 30.5 % (ref 34–46.6)
HGB BLD-MCNC: 9.4 G/DL (ref 12–15.9)
IMM GRANULOCYTES # BLD AUTO: 0.08 10*3/MM3 (ref 0–0.05)
IMM GRANULOCYTES NFR BLD AUTO: 0.8 % (ref 0–0.5)
INR PPP: 1.18 (ref 0.9–1.1)
LYMPHOCYTES # BLD AUTO: 2.63 10*3/MM3 (ref 0.7–3.1)
LYMPHOCYTES NFR BLD AUTO: 27.8 % (ref 19.6–45.3)
MAGNESIUM SERPL-MCNC: 1.7 MG/DL (ref 1.6–2.4)
MCH RBC QN AUTO: 27.4 PG (ref 26.6–33)
MCHC RBC AUTO-ENTMCNC: 30.8 G/DL (ref 31.5–35.7)
MCV RBC AUTO: 88.9 FL (ref 79–97)
MONOCYTES # BLD AUTO: 0.87 10*3/MM3 (ref 0.1–0.9)
MONOCYTES NFR BLD AUTO: 9.2 % (ref 5–12)
NEUTROPHILS NFR BLD AUTO: 5.17 10*3/MM3 (ref 1.7–7)
NEUTROPHILS NFR BLD AUTO: 54.7 % (ref 42.7–76)
NRBC BLD AUTO-RTO: 0.1 /100 WBC (ref 0–0.2)
PLATELET # BLD AUTO: 209 10*3/MM3 (ref 140–450)
PMV BLD AUTO: 11.9 FL (ref 6–12)
POTASSIUM SERPL-SCNC: 4.6 MMOL/L (ref 3.5–5.2)
PROTHROMBIN TIME: 14.8 SECONDS (ref 11.7–14.2)
RBC # BLD AUTO: 3.43 10*6/MM3 (ref 3.77–5.28)
RETICS # AUTO: 0.09 10*6/MM3 (ref 0.02–0.13)
RETICS/RBC NFR AUTO: 2.54 % (ref 0.7–1.9)
SODIUM SERPL-SCNC: 133 MMOL/L (ref 136–145)
WBC # BLD AUTO: 9.45 10*3/MM3 (ref 3.4–10.8)

## 2021-11-05 PROCEDURE — 85610 PROTHROMBIN TIME: CPT | Performed by: INTERNAL MEDICINE

## 2021-11-05 PROCEDURE — 63710000001 INSULIN LISPRO (HUMAN) PER 5 UNITS: Performed by: INTERNAL MEDICINE

## 2021-11-05 PROCEDURE — 97530 THERAPEUTIC ACTIVITIES: CPT

## 2021-11-05 PROCEDURE — 85730 THROMBOPLASTIN TIME PARTIAL: CPT | Performed by: INTERNAL MEDICINE

## 2021-11-05 PROCEDURE — 25010000002 ONDANSETRON PER 1 MG: Performed by: INTERNAL MEDICINE

## 2021-11-05 PROCEDURE — 25010000002 HEPARIN (PORCINE) PER 1000 UNITS: Performed by: INTERNAL MEDICINE

## 2021-11-05 PROCEDURE — 82962 GLUCOSE BLOOD TEST: CPT

## 2021-11-05 PROCEDURE — 25010000002 MORPHINE PER 10 MG: Performed by: INTERNAL MEDICINE

## 2021-11-05 PROCEDURE — 99232 SBSQ HOSP IP/OBS MODERATE 35: CPT | Performed by: INTERNAL MEDICINE

## 2021-11-05 PROCEDURE — 85045 AUTOMATED RETICULOCYTE COUNT: CPT | Performed by: INTERNAL MEDICINE

## 2021-11-05 PROCEDURE — 63710000001 INSULIN GLARGINE PER 5 UNITS: Performed by: INTERNAL MEDICINE

## 2021-11-05 PROCEDURE — 85025 COMPLETE CBC W/AUTO DIFF WBC: CPT | Performed by: INTERNAL MEDICINE

## 2021-11-05 PROCEDURE — 83735 ASSAY OF MAGNESIUM: CPT | Performed by: INTERNAL MEDICINE

## 2021-11-05 PROCEDURE — 73502 X-RAY EXAM HIP UNI 2-3 VIEWS: CPT

## 2021-11-05 PROCEDURE — 94799 UNLISTED PULMONARY SVC/PX: CPT

## 2021-11-05 PROCEDURE — 80048 BASIC METABOLIC PNL TOTAL CA: CPT | Performed by: INTERNAL MEDICINE

## 2021-11-05 RX ORDER — LISINOPRIL 10 MG/1
10 TABLET ORAL
Status: DISCONTINUED | OUTPATIENT
Start: 2021-11-05 | End: 2021-11-06

## 2021-11-05 RX ORDER — INSULIN GLARGINE 100 [IU]/ML
10 INJECTION, SOLUTION SUBCUTANEOUS EVERY 12 HOURS SCHEDULED
Status: DISCONTINUED | OUTPATIENT
Start: 2021-11-05 | End: 2021-11-06

## 2021-11-05 RX ORDER — HYDROCODONE BITARTRATE AND ACETAMINOPHEN 5; 325 MG/1; MG/1
1 TABLET ORAL EVERY 6 HOURS PRN
Status: DISCONTINUED | OUTPATIENT
Start: 2021-11-05 | End: 2021-11-11 | Stop reason: HOSPADM

## 2021-11-05 RX ORDER — INSULIN GLARGINE 100 [IU]/ML
20 INJECTION, SOLUTION SUBCUTANEOUS EVERY MORNING
Status: DISCONTINUED | OUTPATIENT
Start: 2021-11-06 | End: 2021-11-05

## 2021-11-05 RX ADMIN — PANTOPRAZOLE SODIUM 40 MG: 40 TABLET, DELAYED RELEASE ORAL at 06:45

## 2021-11-05 RX ADMIN — Medication 2000 UNITS: at 08:43

## 2021-11-05 RX ADMIN — MORPHINE SULFATE 2 MG: 2 INJECTION, SOLUTION INTRAMUSCULAR; INTRAVENOUS at 11:09

## 2021-11-05 RX ADMIN — ONDANSETRON 4 MG: 2 INJECTION INTRAMUSCULAR; INTRAVENOUS at 12:02

## 2021-11-05 RX ADMIN — SODIUM CHLORIDE, PRESERVATIVE FREE 10 ML: 5 INJECTION INTRAVENOUS at 20:35

## 2021-11-05 RX ADMIN — URSODIOL 300 MG: 300 CAPSULE ORAL at 20:35

## 2021-11-05 RX ADMIN — SODIUM CHLORIDE, PRESERVATIVE FREE 10 ML: 5 INJECTION INTRAVENOUS at 08:43

## 2021-11-05 RX ADMIN — ALLOPURINOL 100 MG: 100 TABLET ORAL at 20:35

## 2021-11-05 RX ADMIN — HEPARIN SODIUM 5000 UNITS: 5000 INJECTION INTRAVENOUS; SUBCUTANEOUS at 20:01

## 2021-11-05 RX ADMIN — INSULIN LISPRO 12 UNITS: 100 INJECTION, SOLUTION INTRAVENOUS; SUBCUTANEOUS at 11:11

## 2021-11-05 RX ADMIN — HYDROCHLOROTHIAZIDE 12.5 MG: 12.5 CAPSULE ORAL at 08:43

## 2021-11-05 RX ADMIN — INSULIN GLARGINE 15 UNITS: 100 INJECTION, SOLUTION SUBCUTANEOUS at 06:46

## 2021-11-05 RX ADMIN — GABAPENTIN 300 MG: 300 CAPSULE ORAL at 20:35

## 2021-11-05 RX ADMIN — URSODIOL 300 MG: 300 CAPSULE ORAL at 08:43

## 2021-11-05 RX ADMIN — INSULIN GLARGINE 10 UNITS: 100 INJECTION, SOLUTION SUBCUTANEOUS at 21:37

## 2021-11-05 RX ADMIN — WARFARIN SODIUM 5 MG: 5 TABLET ORAL at 17:18

## 2021-11-05 RX ADMIN — MORPHINE SULFATE 2 MG: 2 INJECTION, SOLUTION INTRAMUSCULAR; INTRAVENOUS at 06:04

## 2021-11-05 RX ADMIN — GABAPENTIN 300 MG: 300 CAPSULE ORAL at 08:47

## 2021-11-05 RX ADMIN — LISINOPRIL 10 MG: 10 TABLET ORAL at 10:46

## 2021-11-05 RX ADMIN — HEPARIN SODIUM 10 UNITS/KG/HR: 10000 INJECTION, SOLUTION INTRAVENOUS at 16:37

## 2021-11-05 RX ADMIN — AMLODIPINE BESYLATE 5 MG: 5 TABLET ORAL at 20:35

## 2021-11-05 RX ADMIN — INSULIN LISPRO 12 UNITS: 100 INJECTION, SOLUTION INTRAVENOUS; SUBCUTANEOUS at 17:19

## 2021-11-05 RX ADMIN — INSULIN LISPRO 10 UNITS: 100 INJECTION, SOLUTION INTRAVENOUS; SUBCUTANEOUS at 06:45

## 2021-11-05 NOTE — PLAN OF CARE
Goal Outcome Evaluation:  Plan of Care Reviewed With: patient        Progress: no change  Outcome Summary: Pt c/o L hip pain that radiates to left leg PRN IV Morphine given x2 with relief noted. Cont on heparin gtts.

## 2021-11-05 NOTE — PLAN OF CARE
Goal Outcome Evaluation:  Plan of Care Reviewed With: patient            Patient continues to have heparin infusion with titration continuing to be done with lab results. Patient has had left hip pain which resolves with medication. However today she became nauseated today with morphine dose. This pain and nausea ceaded  within 1 1/2 hours. The patient is presently resting comfortably as she reported needing to rest.       Problem: Adult Inpatient Plan of Care  Goal: Plan of Care Review  Outcome: Ongoing, Progressing  Flowsheets  Taken 11/5/2021 1346 by Aliyah Hooks RN  Plan of Care Reviewed With: patient  Taken 11/5/2021 1222 by Renee Dutta PTA  Progress: improving  Outcome Summary: Pt making small improvements w/ PT. Pt sat up to EOB w/ SBA. Pt amb 40' then 55' req CGA and no AD. Pt slow and mildly unsteady w/ no LOB. Pt reporting L hip pain w/ antalgic gait noted. Pt on BR toilet at end of session w/ aide notified. PT will prog as pt flaquita.  Goal: Patient-Specific Goal (Individualized)  Outcome: Ongoing, Progressing  Flowsheets (Taken 11/5/2021 1346)  Patient-Specific Goals (Include Timeframe): patient was encouraged to report a need for medication as needed for pain  Anxieties, Fears or Concerns: anxiety is present when in pain and resolves with resolution of immediate symptoms  Goal: Absence of Hospital-Acquired Illness or Injury  Outcome: Ongoing, Progressing  Intervention: Identify and Manage Fall Risk  Recent Flowsheet Documentation  Taken 11/5/2021 0914 by Aliyah Hooks RN  Safety Promotion/Fall Prevention: (toileted)   nonskid shoes/slippers when out of bed   toileting scheduled   other (see comments)  Taken 11/5/2021 0725 by Aliyah Hooks RN  Safety Promotion/Fall Prevention:   safety round/check completed   clutter free environment maintained  Intervention: Prevent Skin Injury  Recent Flowsheet Documentation  Taken 11/5/2021 1321 by Aliyah Hooks RN  Skin Protection: incontinence pads  utilized  Taken 11/5/2021 1145 by Aliyah Hooks RN  Body Position: sitting up in bed  Taken 11/5/2021 0854 by Aliyah Hooks RN  Body Position: legs elevated  Skin Protection: protective footwear used  Intervention: Prevent Infection  Recent Flowsheet Documentation  Taken 11/5/2021 1321 by Aliyah Hooks RN  Infection Prevention:   hand hygiene promoted   environmental surveillance performed  Taken 11/5/2021 1200 by Aliyah Hooks RN  Infection Prevention:   environmental surveillance performed   single patient room provided   hand hygiene promoted  Taken 11/5/2021 1145 by Aliyah Hooks RN  Infection Prevention: environmental surveillance performed  Taken 11/5/2021 1109 by Aliyah Hooks RN  Infection Prevention:   hand hygiene promoted   environmental surveillance performed  Taken 11/5/2021 1052 by Aliyah Hooks RN  Infection Prevention: environmental surveillance performed  Taken 11/5/2021 0914 by Aliyah Hooks RN  Infection Prevention:   environmental surveillance performed   equipment surfaces disinfected   hand hygiene promoted   personal protective equipment utilized  Taken 11/5/2021 0725 by Aliyah Hooks RN  Infection Prevention:   single patient room provided   personal protective equipment utilized   environmental surveillance performed  Goal: Optimal Comfort and Wellbeing  Outcome: Ongoing, Progressing

## 2021-11-05 NOTE — PROGRESS NOTES
"      [facility]         Dr Vargas Sayied   LOS: 3 days   Patient Care Team:  Jani Sun MD as PCP - General (Family Medicine)    Chief Complaint: Acute submassive PE with morbid obesity CONNOR recent hip pain and surgery.    Interval History: Resting comfortably on 1 L oxygen nasal cannula.  I took her off the oxygen and she is maintaining her sats well.    REVIEW OF SYSTEMS:   CARDIOVASCULAR: No chest pain, chest pressure or chest discomfort. No palpitations or edema.   RESPIRATORY: Shortness of breath with activity.  GASTROINTESTINAL: No anorexia, nausea, vomiting or diarrhea. No abdominal pain or blood.   HEMATOLOGIC: No bleeding or bruising.     Ventilator/Non-Invasive Ventilation Settings (From admission, onward)            None            Vital Signs  Temp:  [97.8 °F (36.6 °C)-98.4 °F (36.9 °C)] 97.8 °F (36.6 °C)  Heart Rate:  [80-88] 80  Resp:  [18] 18  BP: (137-170)/(76-95) 150/95    Intake/Output Summary (Last 24 hours) at 11/5/2021 1156  Last data filed at 11/5/2021 0854  Gross per 24 hour   Intake 480 ml   Output --   Net 480 ml     Flowsheet Rows      First Filed Value   Admission Height 170.2 cm (67\") Documented at 11/01/2021 2221   Admission Weight 127 kg (280 lb) Documented at 11/01/2021 2221          Physical Exam:  Patient is examined using the personal protective equipment as per guidelines from infection control for this particular patient as enacted.  Hand hygiene was performed before and after patient interaction.   General Appearance:    Alert, cooperative, in no acute distress.  Following simple commands  Neck midline trachea, no thyromegaly   Lungs:    Diminished breath sounds in the bases    Heart:    Regular rhythm and normal rate, normal S1 and S2, no            murmur, no gallop, no rub, no click   Chest Wall:    No abnormalities observed   Abdomen:     Normal bowel sounds, no masses, no organomegaly, soft        nontender, nondistended, no guarding, no rebound      "           tenderness   Extremities:   Moves all extremities well, no edema, no cyanosis, no             redness  CNS no focal neurological deficits normal sensory exam  Skin no rashes no nodules  Musculoskeletal no cyanosis no clubbing normal range of motion     Results Review:        Results from last 7 days   Lab Units 11/05/21  0545 11/02/21  0541 11/02/21  0541 11/01/21 1914 11/01/21 1914   SODIUM mmol/L 133*  --  141  --  134*   POTASSIUM mmol/L 4.6  --  4.1  --  4.7   CHLORIDE mmol/L 99  --  102  --  97*   CO2 mmol/L 24.9  --  27.5  --  22.1   BUN mg/dL 15  --  27*  --  31*   CREATININE mg/dL 1.05*  --  1.38*  --  1.61*   GLUCOSE mg/dL 309*   < > 136*   < > 451*   CALCIUM mg/dL 8.8  --  9.6  --  9.4    < > = values in this interval not displayed.     Results from last 7 days   Lab Units 11/02/21  0541 11/01/21 2148 11/01/21 1914   TROPONIN T ng/mL 0.249* 0.185* 0.033*     Results from last 7 days   Lab Units 11/05/21  0545 11/04/21  0546 11/03/21  0522   WBC 10*3/mm3 9.45 9.87 10.38   HEMOGLOBIN g/dL 9.4* 9.8* 9.6*   HEMATOCRIT % 30.5* 30.9* 30.7*   PLATELETS 10*3/mm3 209 205 205     Results from last 7 days   Lab Units 11/05/21  0545 11/04/21  1513 11/04/21  0546 11/03/21  1447 11/02/21  2216 11/02/21  1541   INR  1.18* 1.12*  --   --   --  1.46*   APTT seconds 100.7*  --  88.3* 87.9*   < >  --     < > = values in this interval not displayed.         Results from last 7 days   Lab Units 11/05/21  0545   MAGNESIUM mg/dL 1.7               I reviewed the patient's new clinical results.  I personally viewed and interpreted the patient's chest x-ray.        Medication Review:   allopurinol, 100 mg, Oral, Nightly  amLODIPine, 5 mg, Oral, Nightly  cholecalciferol, 2,000 Units, Oral, Daily  gabapentin, 300 mg, Oral, BID  hydroCHLOROthiazide Oral, 12.5 mg, Oral, Daily  insulin glargine, 10 Units, Subcutaneous, Q12H  insulin lispro, 0-14 Units, Subcutaneous, TID AC  lisinopril, 10 mg, Oral, Q24H  pantoprazole, 40  mg, Oral, QAM  sodium chloride, 10 mL, Intravenous, Q12H  ursodiol, 300 mg, Oral, BID  warfarin, 5 mg, Oral, Daily        heparin (porcine), 18 Units/kg/hr, Last Rate: 10 Units/kg/hr (11/05/21 0849)  Pharmacy to dose warfarin,         ASSESSMENT:   Syncope  Acute submassive PE status post mechanical thrombectomy  CONNOR intolerant to CPAP  Hypertension  GERD  Morbid obesity  Type 2 diabetes mellitus  Acute kidney injury    PLAN:  Much improved post mechanical thrombectomy.  Heparin drip currently ongoing.  Transition to NOAC once okay with cardiology  Home CPAP okay to use  Oxygen to keep sats above 90%  PT OT  Doppler lower extremity still pending  Mobilize ambulate  Hospitalist currently following on the floor  Reeducated patient on CONNOR will likely need outpatient repeat sleep study if patient wishes to resume CPAP.        Alicia Clayton MD  11/05/21  11:56 EDT

## 2021-11-05 NOTE — PROGRESS NOTES
[facility] Progress Note       Encounter Date:21  Patient:Grace Renee  :1950  MRN:[id number]      Chief Complaint: Follow-up pulmonary emboli      Subjective:        Patient continues to do fairly well.  We are currently transitioning from heparin over to Coumadin due to cost issues.    Review of Systems:  Review of Systems   Constitutional: Positive for malaise/fatigue.   Cardiovascular: Negative for chest pain.   Respiratory: Positive for cough and shortness of breath.        Medications:  Scheduled Meds:  allopurinol, 100 mg, Oral, Nightly  amLODIPine, 5 mg, Oral, Nightly  cholecalciferol, 2,000 Units, Oral, Daily  gabapentin, 300 mg, Oral, BID  hydroCHLOROthiazide Oral, 12.5 mg, Oral, Daily  insulin glargine, 15 Units, Subcutaneous, QAM  insulin lispro, 0-14 Units, Subcutaneous, TID AC  pantoprazole, 40 mg, Oral, QAM  sodium chloride, 10 mL, Intravenous, Q12H  ursodiol, 300 mg, Oral, BID  warfarin, 5 mg, Oral, Daily    Continuous Infusions:  heparin (porcine), 18 Units/kg/hr, Last Rate: 12 Units/kg/hr (21)  Pharmacy to dose warfarin,     PRN Meds:  •  acetaminophen  •  calcium carbonate  •  dextrose  •  dextrose  •  dextrose  •  dextrose  •  glucagon (human recombinant)  •  glucagon (human recombinant)  •  heparin (porcine)  •  influenza vaccine  •  Morphine  •  nitroglycerin  •  ondansetron **OR** ondansetron  •  Pharmacy to dose warfarin  •  [COMPLETED] Insert peripheral IV **AND** sodium chloride  •  sodium chloride  •  sodium chloride         Objective:       Vitals:    21 2335 21 0500 21 0730   BP: 170/76 159/77  137/76   BP Location: Right arm Right arm  Left arm   Patient Position: Lying Lying  Sitting   Pulse: 82 83  82   Resp: 18 18  18   Temp: 98.4 °F (36.9 °C) 98.2 °F (36.8 °C)  97.8 °F (36.6 °C)   TempSrc: Oral Oral  Oral   SpO2: 94% 94%  96%   Weight:   128 kg (282 lb)    Height:               Physical  Exam:  Constitutional: Well appearing, well developed, no acute distress   HENT: Oropharynx clear and membrane moist  Eyes: Normal conjunctiva, no sclera icterus.  Neck: Supple, no carotid bruit bilaterally.  Cardiovascular: Regular rate and rhythm, No Murmur, No bilateral lower extremity edema.  Pulmonary: Normal respiratory effort, normal lung sounds, no wheezing.  Abdominal: Soft, nontender, no hepatosplenomegaly, liver is non-pulsatile.  Neurological: Alert and orient x 3.   Skin: Warm, dry, no ecchymosis, no rash.  Right groin site no hematoma and soft figure-of-eight suture removed  Psych: Appropriate mood and affect. Normal judgment and insight.           Lab Review:   Results from last 7 days   Lab Units 11/05/21  0545 11/02/21  0541 11/01/21 1914   SODIUM mmol/L 133* 141 134*   POTASSIUM mmol/L 4.6 4.1 4.7   CHLORIDE mmol/L 99 102 97*   CO2 mmol/L 24.9 27.5 22.1   BUN mg/dL 15 27* 31*   CREATININE mg/dL 1.05* 1.38* 1.61*   GLUCOSE mg/dL 309* 136* 451*   CALCIUM mg/dL 8.8 9.6 9.4   AST (SGOT) U/L  --  41* 57*   ALT (SGPT) U/L  --  33 37*     Results from last 7 days   Lab Units 11/02/21  0541 11/01/21 2148 11/01/21 1914   TROPONIN T ng/mL 0.249* 0.185* 0.033*     Results from last 7 days   Lab Units 11/05/21  0545 11/04/21  0546 11/03/21  0522 11/02/21  1541 11/02/21  1432 11/02/21  1342 11/02/21  0541 11/01/21 1914 11/01/21 1914   WBC 10*3/mm3 9.45 9.87 10.38 11.11*  --   --  11.86*  --  12.92*   HEMOGLOBIN g/dL 9.4* 9.8* 9.6* 10.4*  --   --  11.8*   < > 12.5   HEMOGLOBIN, POC g/dL  --   --   --   --  11.6* 11.6*  --   --   --    HEMATOCRIT % 30.5* 30.9* 30.7* 32.4*  --   --  35.6   < > 39.0   HEMATOCRIT POC %  --   --   --   --  34* 34*  --   --   --    PLATELETS 10*3/mm3 209 205 205 216  --   --  253  --  225    < > = values in this interval not displayed.     Results from last 7 days   Lab Units 11/05/21  0545 11/04/21  1513 11/04/21  0546 11/03/21  1447 11/03/21  0717 11/03/21  0522 11/02/21  2216  11/02/21  1541   INR  1.18* 1.12*  --   --   --   --   --  1.46*   APTT seconds 100.7*  --  88.3* 87.9* 138.0* >200.0* >200.0*  --      Results from last 7 days   Lab Units 11/05/21  0545   MAGNESIUM mg/dL 1.7           Invalid input(s): LDLCALC  Results from last 7 days   Lab Units 11/01/21  1914   PROBNP pg/mL 99.8           Echocardiogram 11/2/2021 with images reviewed myself:  · Left ventricular wall thickness is consistent with moderate concentric hypertrophy.  · Estimated right ventricular systolic pressure from tricuspid regurgitation is normal (<35 mmHg).  · Left ventricular diastolic function is consistent with (grade I) impaired relaxation.  · Calculated left ventricular EF = 84.7% Estimated left ventricular EF was in agreement with the calculated left ventricular EF. Left ventricular systolic function is hyperdynamic (EF > 70%).    Right heart catheterization with pulmonary angiography and thrombectomy 11/2/2021:  · Severe pulmonary hypertension with mean pulmonary artery pressures of 42 mmHg with decompensated state with elevated right atrial pressure of 27 mmHg.  · Reduced cardiac output and index of 3.8 L/min and 6.4 L/min/m2  · Aspiration and thrombectomy performed in right and left pulmonary arteries.  Successful removal of grade 2 clot score from right pulmonary artery and a grade 1 clot score from the left.  · Unchanged hemodynamics with continued severe pulmonary pretension with a mean PA 42 mmHg with elevated right atrial pressures and moderately reduced cardiac output and index of 4.1 L/min and 1.8 L/min/m2         Assessment:          Diagnosis Plan   1. Syncope, unspecified syncope type     2. Elevated troponin     3. Bilateral pulmonary embolism (HCC)  Adult Transthoracic Echo Complete w/ Color, Spectral and Contrast if Necessary Per Protocol          Plan:       Ms. Renee is a 71 y.o. woman with past medical history notable for morbid obesity and chronic low back pain who presented to the  emergency room yesterday with a syncopal spell and was noted to be hypoxic.  Initial work-up was limited due to her chronic kidney disease and CTA was deferred.  She was given Lovenox with plans for VQ scan this morning but unfortunately she was also noted to have a mildly elevated troponin which continued to rise the patient became somewhat hypotensive this morning.  A stat CTA was performed and confirmed large bilateral pulmonary emboli with evidence of right ventricular strain.  Patient's clinical status actually worsened and she was transferred to the intensive care unit and eventually transferred to the Cath Lab for urgent pulmonary angiography with possible aspiration thrombectomy.  She underwent aspiration thrombectomy with moderate clot burden removal but really no change in pulmonary pressures.  This is more consistent with an acute on chronic presentation.  Furthermore when I talked with her further she is really been having a persistent cough for almost 2 months my guess is that her clots have been there since then.  Overall she is making slow but steady recovery.  Her blood pressure and heart rate are improved.  She still requiring a little bit of supplemental oxygen but this morning she was actually off her supplemental oxygen and satting fairly well.  Her blood pressure is elevated and her lisinopril is still not restarted I think we can add this back today given recovery of her kidney function from admission.    Pulmonary emboli with cor pulmonale:  · Continue anticoagulation and would continue IV heparin as heparin bridge to therapeutic Coumadin with INR greater than 2  · Presentation more likely acute on chronic and residual clot burden likely represents chronic clot based upon angiographic appearance and the type of clot removed  · Will need follow-up on echocardiogram in 30 days to see if any improvement.    Hypertension:  · Continue with current medical regimen but can add back lisinopril slowly  with close monitoring of creatinine clearance             Chris Pritchett MD  [facility]  11/05/21  08:24 EDT

## 2021-11-05 NOTE — PLAN OF CARE
Goal Outcome Evaluation:  Plan of Care Reviewed With: patient        Progress: improving  Outcome Summary: Pt making small improvements w/ PT. Pt sat up to EOB w/ SBA. Pt amb 40' then 55' req CGA and no AD. Pt slow and mildly unsteady w/ no LOB. Pt reporting L hip pain w/ antalgic gait noted. Pt on BR toilet at end of session w/ aide notified. PT will prog as pt flaquita.    Patient was intermittently wearing a face mask during this therapy encounter. Therapist used appropriate personal protective equipment including eye protection, mask, and gloves.  Mask used was standard procedure mask. Appropriate PPE was worn during the entire therapy session. Hand hygiene was completed before and after therapy session. Patient is not in enhanced droplet precautions.

## 2021-11-05 NOTE — PROGRESS NOTES
Name: Grace Renee ADMIT: 2021   : 1950  PCP: Jani Sun MD    MRN: [id number] LOS: 3 days   AGE/SEX: 71 y.o. female  ROOM: [facility]     Subjective   Subjective   Positive exertional dyspnea.  Positive occasional left-sided atypical chest pain.  Continues with cough occasionally productive of yellowish sputum.  No fever or chills.  No hemoptysis.  No PND.  No palpitation.  No ankle edema.  No more loss of consciousness.  No focal neurological symptoms.    Review of Systems  GI.  No abdominal pain or nausea or vomiting.  Normal bowel habits without constipation/diarrhea/bleeding per rectum/melena.  .  No dysuria or hematuria.       Objective   Objective   Vital Signs  Temp:  [97.8 °F (36.6 °C)-98.4 °F (36.9 °C)] 97.8 °F (36.6 °C)  Heart Rate:  [76-88] 82  Resp:  [18] 18  BP: (117-170)/(59-77) 137/76  SpO2:  [94 %-100 %] 96 %  on  Flow (L/min):  [1-2] 1;   Device (Oxygen Therapy): nasal cannula    Intake/Output Summary (Last 24 hours) at 2021 0954  Last data filed at 2021 0854  Gross per 24 hour   Intake 480 ml   Output --   Net 480 ml     Body mass index is 44.17 kg/m².      21  2221 21  1609 21  0500   Weight: 127 kg (280 lb) 127 kg (280 lb) 128 kg (282 lb)     Physical Exam  General.  Middle-aged female.  Obese.  Alert and oriented x3.  Mild respiratory distress.  No pain.  No diaphoresis.  Eyes.  Pupils equal round and reactive.  Intact extraocular musculature.  No pallor or jaundice.  Normal conjunctive and lids.  Oral cavity.  Moist mucous membrane.   Neck.  Supple.  No JVD.  No lymphadenopathy or thyromegaly.  Cardiovascular.  Regular rate and rhythm.  No gallops or murmurs.  Chest.  Poor but clear to auscultation bilaterally with no added sounds.  Abdomen.  Obese.  Soft lax.  No tenderness.  No organomegaly.  No guarding or rebound.  Extremities.  Trace bilateral lower extremity edema.  CNS.  No acute focal neurological deficits.    Results Review:       Results from last 7 days   Lab Units 11/05/21  0545 11/02/21  0541 11/01/21 1914   SODIUM mmol/L 133* 141 134*   POTASSIUM mmol/L 4.6 4.1 4.7   CHLORIDE mmol/L 99 102 97*   CO2 mmol/L 24.9 27.5 22.1   BUN mg/dL 15 27* 31*   CREATININE mg/dL 1.05* 1.38* 1.61*   GLUCOSE mg/dL 309* 136* 451*   CALCIUM mg/dL 8.8 9.6 9.4   AST (SGOT) U/L  --  41* 57*   ALT (SGPT) U/L  --  33 37*     Estimated Creatinine Clearance: 69.4 mL/min (A) (by C-G formula based on SCr of 1.05 mg/dL (H)).  Results from last 7 days   Lab Units 11/02/21  0144   HEMOGLOBIN A1C % 9.27*     Results from last 7 days   Lab Units 11/05/21  0632 11/04/21  2108 11/04/21  1545 11/04/21  1253 11/04/21  0658 11/04/21  0158 11/03/21  1955 11/03/21  1731   GLUCOSE mg/dL 334* 279* 304* 177* 191* 355* 326* 308*     Results from last 7 days   Lab Units 11/02/21  0541 11/01/21  2148 11/01/21 1914   TROPONIN T ng/mL 0.249* 0.185* 0.033*     Results from last 7 days   Lab Units 11/01/21  1914   PROBNP pg/mL 99.8         Results from last 7 days   Lab Units 11/05/21  0545   MAGNESIUM mg/dL 1.7           Invalid input(s): LDLCALC  Results from last 7 days   Lab Units 11/05/21  0545 11/04/21  0546 11/04/21  0546 11/03/21  0522 11/03/21  0522 11/02/21  1541 11/02/21  1541 11/02/21  1432 11/02/21  1342 11/02/21  0541 11/02/21  0541 11/01/21  1914 11/01/21  1914   0000   WBC 10*3/mm3 9.45  --  9.87  --  10.38  --  11.11*  --   --   --  11.86*  --  12.92*  --    HEMOGLOBIN g/dL 9.4*  --  9.8*  --  9.6*  --  10.4*  --   --   --  11.8*   < > 12.5  --    HEMOGLOBIN, POC g/dL  --   --   --   --   --   --   --  11.6* 11.6*  --   --   --   --   --    HEMATOCRIT % 30.5*  --  30.9*  --  30.7*  --  32.4*  --   --   --  35.6   < > 39.0  --    HEMATOCRIT POC %  --   --   --   --   --   --   --  34* 34*  --   --   --   --   --    PLATELETS 10*3/mm3 209  --  205  --  205  --  216  --   --   --  253  --  225  --    MCV fL 88.9   < > 88.0   < > 89.5   < > 88.0  --   --    < > 85.0   <  > 88.2  --    MCH pg 27.4   < > 27.9   < > 28.0   < > 28.3  --   --    < > 28.2   < > 28.3  --    MCHC g/dL 30.8*   < > 31.7   < > 31.3*   < > 32.1  --   --    < > 33.1   < > 32.1  --    RDW % 12.6   < > 12.5   < > 12.7   < > 12.6  --   --    < > 12.8   < > 12.4  --    RDW-SD fl 40.7   < > 39.7   < > 41.7   < > 40.4  --   --    < > 38.4   < > 39.4  --    MPV fL 11.9   < > 10.7   < > 11.3   < > 11.2  --   --    < > 11.0   < > 10.8  --    NEUTROPHIL % % 54.7   < > 53.0   < > 60.9   < > 70.6  --   --   --   --   --  70.8   < >   LYMPHOCYTE % % 27.8   < > 28.8   < > 21.9   < > 19.0*  --   --   --   --   --  18.1*   < >   MONOCYTES % % 9.2   < > 9.4   < > 9.4   < > 7.1  --   --   --   --   --  6.8   < >   EOSINOPHIL % % 6.5*   < > 7.3*   < > 6.2   < > 2.0  --   --   --   --   --  3.0   < >   BASOPHIL % % 1.0   < > 1.0   < > 1.2   < > 0.9  --   --   --   --   --  0.8   < >   IMM GRAN % % 0.8*   < > 0.5   < > 0.4   < > 0.4  --   --   --   --   --   --   --    NEUTROS ABS 10*3/mm3 5.17   < > 5.23   < > 6.33   < > 7.85*  --   --   --   --   --  9.14*   < >   LYMPHS ABS 10*3/mm3 2.63   < > 2.84   < > 2.27   < > 2.11  --   --   --   --   --  2.34   < >   MONOS ABS 10*3/mm3 0.87   < > 0.93*   < > 0.98*   < > 0.79  --   --   --   --   --  0.88   < >   EOS ABS 10*3/mm3 0.61*   < > 0.72*   < > 0.64*   < > 0.22  --   --   --   --   --  0.39   < >   BASOS ABS 10*3/mm3 0.09   < > 0.10   < > 0.12   < > 0.10  --   --   --   --   --  0.10   < >   IMMATURE GRANS (ABS) 10*3/mm3 0.08*   < > 0.05   < > 0.04   < > 0.04  --   --   --   --   --   --   --    NRBC /100 WBC 0.1   < > 0.1   < > 0.0   < > 0.1  --   --   --   --   --   --   --     < > = values in this interval not displayed.     Results from last 7 days   Lab Units 11/05/21  0545 11/04/21  1513 11/04/21  0546 11/03/21  1447 11/03/21  0717 11/03/21  0522 11/02/21  2216 11/02/21  1541   INR  1.18* 1.12*  --   --   --   --   --  1.46*   APTT seconds 100.7*  --  88.3* 87.9* 138.0*  >200.0* >200.0*  --                          Results from last 7 days   Lab Units 11/01/21 1954   ADENOVIRUS DETECTION BY PCR  Not Detected   CORONAVIRUS 229E  Not Detected   CORONAVIRUS HKU1  Not Detected   CORONAVIRUS NL63  Not Detected   CORONAVIRUS OC43  Not Detected   HUMAN METAPNEUMOVIRUS  Not Detected   HUMAN RHINOVIRUS/ENTEROVIRUS  Not Detected   INFLUENZA B PCR  Not Detected   PARAINFLUENZA 1  Not Detected   PARAINFLUENZA VIRUS 2  Not Detected   PARAINFLUENZA VIRUS 3  Not Detected   PARAINFLUENZA VIRUS 4  Not Detected   BORDETELLA PERTUSSIS PCR  Not Detected   CHLAMYDOPHILA PNEUMONIAE PCR  Not Detected   MYCOPLAMA PNEUMO PCR  Not Detected   INFLUENZA A PCR  Not Detected   RSV, PCR  Not Detected               Imaging:  Imaging Results (Last 24 Hours)     ** No results found for the last 24 hours. **             I reviewed the patient's new clinical results / labs / tests / procedures      Assessment/Plan     Active Hospital Problems    Diagnosis  POA   • **Bilateral pulmonary embolism (HCC) [I26.99]  Yes   • Anemia [D64.9]  No   • Acute respiratory failure with hypoxia (HCC) [J96.01]  Yes   • Left hip pain [M25.552]  Yes   • Carpal tunnel syndrome, bilateral [G56.03]  Yes   • Sleep apnea [G47.30]  Yes   • Hypertension [I10]  Yes   • GERD (gastroesophageal reflux disease) [K21.9]  Yes   • Elevated cholesterol [E78.00]  Yes   • Type 2 diabetes mellitus, with long-term current use of insulin (HCC) [E11.9, Z79.4]  Not Applicable   • DINAH (acute kidney injury) ([facility]) [N17.9]  Yes   • Syncope [R55]  Yes      Resolved Hospital Problems   No resolved problems to display.           Syncope secondary to bilateral pulmonary emboli with cor pulmonale and right ventricular stain leading to acute hypoxemic respiratory failure and hypotension.  Status post mechanical thrombectomy.  Currently on heparin being transitioned to Coumadin.  Cardiology is following and believes this is an acute on top of chronic blood moderate  emboli as there has not been any significant change in the pulmonary pressures.  Lower extremity venous ultrasound is negative.  Pulmonary also on board.  Both consults appreciated.  Continue to wean off oxygen.  Acute kidney failure.  Most likely secondary to prerenal azotemia because of hypotension at admission and the patient was taking ACE inhibitors.  Kidney function improving.  Patient back on her lisinopril.  Appears euvolemic.  Continue monitoring.  History of hypertension and elevated troponin.  Chest pain is atypical.  Troponin elevation mostly secondary to PE.  Good blood pressure control with no evidence of angina or congestive heart failure on Norvasc/hydrochlorothiazide/lisinopril.  Type 2 diabetes.  A1c 9.2.  Diabetic education consult obtained will increase Lantus and continue sliding scale insulin as the Accu-Cheks are elevated.  Obstructive sleep apnea.  Continue CPAP.  Anemia.  Will work-up.  Motility disorder.  On PT.  PT recommends skilled facility.      Discussed with patient/nurse.  Laci Gordon MD  [facility]  11/05/21  09:54 EDT

## 2021-11-05 NOTE — PROGRESS NOTES
Pharmacy Consult: Warfarin Dosing/ Monitoring    Grace Renee is a 71 y.o. female, estimated creatinine clearance is 69.4 mL/min (A) (by C-G formula based on SCr of 1.05 mg/dL (H)). weighing 128 kg (282 lb).     has a past medical history of Diabetes mellitus (HCC), Elevated cholesterol, GERD (gastroesophageal reflux disease), Hypertension, and Sleep apnea.    Social History     Tobacco Use   • Smoking status: Never Smoker   • Smokeless tobacco: Never Used   Substance Use Topics   • Alcohol use: Not Currently   • Drug use: Never       Results from last 7 days   Lab Units 11/05/21  0545 11/04/21  1513 11/04/21  0546 11/03/21  1447 11/03/21  0717 11/03/21  0522 11/02/21  2216 11/02/21  1541 11/02/21  1432 11/02/21  1342 11/02/21  0541 11/01/21  1914 11/01/21 1914   INR  1.18* 1.12*  --   --   --   --   --  1.46*  --   --   --   --   --    APTT seconds 100.7*  --  88.3* 87.9* 138.0* >200.0* >200.0*  --   --   --   --   --   --    HEMOGLOBIN g/dL 9.4*  --  9.8*  --   --  9.6*  --  10.4*  --   --  11.8*   < > 12.5   HEMOGLOBIN, POC g/dL  --   --   --   --   --   --   --   --  11.6* 11.6*  --   --   --    HEMATOCRIT % 30.5*  --  30.9*  --   --  30.7*  --  32.4*  --   --  35.6   < > 39.0   HEMATOCRIT POC %  --   --   --   --   --   --   --   --  34* 34*  --   --   --    PLATELETS 10*3/mm3 209  --  205  --   --  205  --  216  --   --  253  --  225    < > = values in this interval not displayed.     Results from last 7 days   Lab Units 11/05/21 0545 11/02/21 0541 11/01/21 1914   SODIUM mmol/L 133* 141 134*   POTASSIUM mmol/L 4.6 4.1 4.7   CHLORIDE mmol/L 99 102 97*   CO2 mmol/L 24.9 27.5 22.1   BUN mg/dL 15 27* 31*   CREATININE mg/dL 1.05* 1.38* 1.61*   CALCIUM mg/dL 8.8 9.6 9.4   BILIRUBIN mg/dL  --  0.3 0.4   ALK PHOS U/L  --  97 101   ALT (SGPT) U/L  --  33 37*   AST (SGOT) U/L  --  41* 57*   GLUCOSE mg/dL 309* 136* 451*     Anticoagulation history: New start warfarin.     Hospital Anticoagulation:  Consulting  provider: Sierra Brush MD  Start date: 11/04/21  Indication: DVT/PE (active thrombosis)  Target INR: 2-3  Bridge Therapy: Yes              and unfractionated heparin  Date 11/4 11/5           INR 1.12 1.18           Warfarin dose 5 mg              Potential drug interactions:   - Allopurinol: May enhance the anticoagulant effect of warfarin.   - Meloxicam: May enhance the anticoagulant effect of warfarin.     Relevant nutrition status: consistent carb    Education complete?/ Date: TBD    Assessment/Plan:  INR minimally increased after 1st day of new start warfarin regimen, which is not uncommon. Significant INR increases may take up to 2-3 days in response to dose initiation or changes. Will continue same dose today but anticipate decreasing regimen once INR increases closer to goal range.     1) Continue warfarin 5 mg PO daily.  2) INR ordered daily with AM labs while inpatient.  3) Recommend continuing bridge therapy with heparin IV infusion until INR within goal range for >24 hours.     Pharmacy will continue to follow until discharge or discontinuation of warfarin.     Alexandro Torres, PharmD  11/5/2021

## 2021-11-05 NOTE — THERAPY TREATMENT NOTE
Patient Name: Grace Renee  : 1950    MRN: [id number]                              Today's Date: 2021       Admit Date: 2021    Visit Dx:     ICD-10-CM ICD-9-CM   1. Syncope, unspecified syncope type  R55 780.2   2. Elevated troponin  R77.8 790.6   3. Bilateral pulmonary embolism (HCC)  I26.99 415.19     Patient Active Problem List   Diagnosis   • Syncope   • Left hip pain   • Carpal tunnel syndrome, bilateral   • Bilateral pulmonary embolism (HCC)   • Sleep apnea   • Hypertension   • GERD (gastroesophageal reflux disease)   • Elevated cholesterol   • Type 2 diabetes mellitus, with long-term current use of insulin (HCC)   • DINAH (acute kidney injury) (HCC)   • Acute respiratory failure with hypoxia (HCC)   • Anemia     Past Medical History:   Diagnosis Date   • Diabetes mellitus (HCC)    • Elevated cholesterol    • GERD (gastroesophageal reflux disease)    • Hypertension    • Sleep apnea      Past Surgical History:   Procedure Laterality Date   • CARDIAC CATHETERIZATION Bilateral 2021    Procedure: Percutaneous Mechanical Thrombectomy- INARI;  Surgeon: Chris Pritchett MD;  Location:  [facility];  Service: Cardiovascular;  Laterality: Bilateral;   • CARDIAC CATHETERIZATION N/A 2021    Procedure: Right Heart Cath;  Surgeon: Chris Pritchett MD;  Location:  [facility];  Service: Cardiovascular;  Laterality: N/A;   • INTERVENTIONAL RADIOLOGY PROCEDURE Bilateral 2021    Procedure: Pulmonary Angiogram;  Surgeon: Chris Pritchett MD;  Location:  [facility];  Service: Cardiovascular;  Laterality: Bilateral;      General Information     Row Name 21 1220          Physical Therapy Time and Intention    Document Type therapy note (daily note)  -PH     Mode of Treatment physical therapy  -PH     Row Name 21 1220          General Information    Existing Precautions/Restrictions fall  -PH     Row Name 21 1221           Cognition    Orientation Status (Cognition) oriented x 4  -PH     Row Name 11/05/21 1220          Safety Issues, Functional Mobility    Impairments Affecting Function (Mobility) endurance/activity tolerance; balance; strength; shortness of breath; postural/trunk control; pain  -PH           User Key  (r) = Recorded By, (t) = Taken By, (c) = Cosigned By    Initials Name Provider Type    Renee Cruz PTA Physical Therapy Assistant               Mobility     Row Name 11/05/21 1220          Bed Mobility    Bed Mobility supine-sit  -PH     Supine-Sit [place] (Bed Mobility) standby assist  -PH     Assistive Device (Bed Mobility) bed rails; head of bed elevated  -PH     Row Name 11/05/21 1220          Sit-Stand Transfer    Sit-Stand [place] (Transfers) contact guard  -PH     Assistive Device (Sit-Stand Transfers) other (see comments)  No AD  -PH     Row Name 11/05/21 1220          Gait/Stairs (Locomotion)    [place] Level (Gait) contact guard  -PH     Assistive Device (Gait) other (see comments)  No AD  -PH     Distance in Feet (Gait) 40 w/ seated rest of approx 4 min to talk to MD then 55' to BR  -PH     Deviations/Abnormal Patterns (Gait) neeraj decreased; gait speed decreased  -PH     Bilateral Gait Deviations forward flexed posture  -PH     Comment (Gait/Stairs) very slow gait w/ mild unsteadiness / no LOB; pt exhibiting SOA on RA  -PH           User Key  (r) = Recorded By, (t) = Taken By, (c) = Cosigned By    Initials Name Provider Type     Renee Dutta PTA Physical Therapy Assistant               Obj/Interventions     Row Name 11/05/21 1222          Balance    Static Sitting Balance unsupported; sitting, edge of bed; WFL  -PH     Static Standing Balance WFL; unsupported  -PH           User Key  (r) = Recorded By, (t) = Taken By, (c) = Cosigned By    Initials Name Provider Type    Renee Cruz PTA Physical Therapy Assistant               Goals/Plan    No  documentation.                Clinical Impression     Row Name 11/05/21 1222          Pain    Additional Documentation Pain Scale: FACES Pre/Post-Treatment (Group)  -PH     Row Name 11/05/21 1222          Pain Scale: FACES Pre/Post-Treatment    Pain: FACES Scale, Pretreatment 2-->hurts little bit  -PH     Posttreatment Pain Rating 2-->hurts little bit  -PH     Pre/Posttreatment Pain Comment chest pain  -PH     Row Name 11/05/21 1222          Plan of Care Review    Plan of Care Reviewed With patient  -PH     Progress improving  -PH     Outcome Summary Pt making small improvements w/ PT. Pt sat up to EOB w/ SBA. Pt amb 40' then 55' req CGA and no AD. Pt slow and mildly unsteady w/ no LOB. Pt reporting L hip pain w/ antalgic gait noted. Pt on BR toilet at end of session w/ aide notified. PT will prog as pt flaquita.  -PH     Row Name 11/05/21 1222          Vital Signs    O2 Delivery Pre Treatment supplemental O2  -PH     O2 Delivery Intra Treatment room air  -PH     O2 Delivery Post Treatment supplemental O2  -PH     Row Name 11/05/21 1222          Positioning and Restraints    Pre-Treatment Position in bed  -PH     Post Treatment Position bathroom  -PH     Bathroom notified nsg; sitting; call light within reach; encouraged to call for assist  -PH           User Key  (r) = Recorded By, (t) = Taken By, (c) = Cosigned By    Initials Name Provider Type    PH Renee Dutta PTA Physical Therapy Assistant               Outcome Measures     Row Name 11/05/21 1225          How much help from another person do you currently need...    Turning from your back to your side while in flat bed without using bedrails? 4  -PH     Moving from lying on back to sitting on the side of a flat bed without bedrails? 3  -PH     Moving to and from a bed to a chair (including a wheelchair)? 3  -PH     Standing up from a chair using your arms (e.g., wheelchair, bedside chair)? 3  -PH     Climbing 3-5 steps with a railing? 2  -PH     To walk  in hospital room? 3  -PH     AM-PAC 6 Clicks Score (PT) 18  -PH     Row Name 11/05/21 1225          Functional Assessment    Outcome Measure Options AM-PAC 6 Clicks Basic Mobility (PT)  -           User Key  (r) = Recorded By, (t) = Taken By, (c) = Cosigned By    Initials Name Provider Type     Renee Dutta PTA Physical Therapy Assistant                             Physical Therapy Education                 Title: PT OT SLP Therapies (Done)     Topic: Physical Therapy (Done)     Point: Mobility training (Done)     Learning Progress Summary           Patient Acceptance, E,D, VU by  at 11/5/2021 1225    Acceptance, E, VU by CF at 11/4/2021 1357                   Point: Home exercise program (Done)     Learning Progress Summary           Patient Acceptance, E, VU by CF at 11/4/2021 1357                   Point: Body mechanics (Done)     Learning Progress Summary           Patient Acceptance, E,D, VU by  at 11/5/2021 1225    Acceptance, E, VU by CF at 11/4/2021 1357                   Point: Precautions (Done)     Learning Progress Summary           Patient Acceptance, E,D, VU by  at 11/5/2021 1225    Acceptance, E, VU by CF at 11/4/2021 1357                               User Key     Initials Effective Dates Name Provider Type Discipline     06/16/21 -  Renee Dutta PTA Physical Therapy Assistant PT    CF 06/16/21 -  Leslie Rogers PT Physical Therapist PT              PT Recommendation and Plan     Plan of Care Reviewed With: patient  Progress: improving  Outcome Summary: Pt making small improvements w/ PT. Pt sat up to EOB w/ SBA. Pt amb 40' then 55' req CGA and no AD. Pt slow and mildly unsteady w/ no LOB. Pt reporting L hip pain w/ antalgic gait noted. Pt on BR toilet at end of session w/ aide notified. PT will prog as pt flaquita.     Time Calculation:    PT Charges     Row Name 11/05/21 1225             Time Calculation    Start Time 0940  -      Stop Time 0958  -      Time  Calculation (min) 18 min  -PH      PT Received On 11/05/21  -PH      PT - Next Appointment 11/08/21  -PH              Timed Charges    16504 - PT Therapeutic Activity Minutes 18  -PH              Total Minutes    Timed Charges Total Minutes 18  -PH       Total Minutes 18  -PH            User Key  (r) = Recorded By, (t) = Taken By, (c) = Cosigned By    Initials Name Provider Type    PH Renee Dutta PTA Physical Therapy Assistant              Therapy Charges for Today     Code Description Service Date Service Provider Modifiers Qty    [id number]  PT THERAPEUTIC ACT EA 15 MIN 11/5/2021 Renee Dutta PTA GP 1          PT G-Codes  Outcome Measure Options: AM-PAC 6 Clicks Basic Mobility (PT)  AM-PAC 6 Clicks Score (PT): 18    Renee Dutta PTA  11/5/2021

## 2021-11-06 PROBLEM — M16.12 OSTEOARTHRITIS OF LEFT HIP: Status: ACTIVE | Noted: 2021-11-06

## 2021-11-06 LAB
ALBUMIN SERPL-MCNC: 3.4 G/DL (ref 3.5–5.2)
ALBUMIN/GLOB SERPL: 0.9 G/DL
ALP SERPL-CCNC: 95 U/L (ref 39–117)
ALT SERPL W P-5'-P-CCNC: 24 U/L (ref 1–33)
ANION GAP SERPL CALCULATED.3IONS-SCNC: 11.1 MMOL/L (ref 5–15)
APTT PPP: 146.7 SECONDS (ref 22.7–35.4)
APTT PPP: 66.2 SECONDS (ref 22.7–35.4)
APTT PPP: 91.2 SECONDS (ref 22.7–35.4)
AST SERPL-CCNC: 27 U/L (ref 1–32)
BASOPHILS # BLD AUTO: 0.11 10*3/MM3 (ref 0–0.2)
BASOPHILS NFR BLD AUTO: 1 % (ref 0–1.5)
BILIRUB SERPL-MCNC: 0.3 MG/DL (ref 0–1.2)
BUN SERPL-MCNC: 15 MG/DL (ref 8–23)
BUN/CREAT SERPL: 12.7 (ref 7–25)
CALCIUM SPEC-SCNC: 9.3 MG/DL (ref 8.6–10.5)
CHLORIDE SERPL-SCNC: 98 MMOL/L (ref 98–107)
CO2 SERPL-SCNC: 23.9 MMOL/L (ref 22–29)
CREAT SERPL-MCNC: 1.18 MG/DL (ref 0.57–1)
DEPRECATED RDW RBC AUTO: 38.1 FL (ref 37–54)
EOSINOPHIL # BLD AUTO: 0.71 10*3/MM3 (ref 0–0.4)
EOSINOPHIL NFR BLD AUTO: 6.2 % (ref 0.3–6.2)
ERYTHROCYTE [DISTWIDTH] IN BLOOD BY AUTOMATED COUNT: 12.6 % (ref 12.3–15.4)
FERRITIN SERPL-MCNC: 671 NG/ML (ref 13–150)
FOLATE SERPL-MCNC: 11.8 NG/ML (ref 4.78–24.2)
GFR SERPL CREATININE-BSD FRML MDRD: 55 ML/MIN/1.73
GLOBULIN UR ELPH-MCNC: 3.6 GM/DL
GLUCOSE BLDC GLUCOMTR-MCNC: 261 MG/DL (ref 70–130)
GLUCOSE BLDC GLUCOMTR-MCNC: 294 MG/DL (ref 70–130)
GLUCOSE BLDC GLUCOMTR-MCNC: 331 MG/DL (ref 70–130)
GLUCOSE BLDC GLUCOMTR-MCNC: 380 MG/DL (ref 70–130)
GLUCOSE BLDC GLUCOMTR-MCNC: 401 MG/DL (ref 70–130)
GLUCOSE SERPL-MCNC: 304 MG/DL (ref 65–99)
HCT VFR BLD AUTO: 29.2 % (ref 34–46.6)
HEMOCCULT STL QL: NEGATIVE
HGB BLD-MCNC: 9.5 G/DL (ref 12–15.9)
IMM GRANULOCYTES # BLD AUTO: 0.12 10*3/MM3 (ref 0–0.05)
IMM GRANULOCYTES NFR BLD AUTO: 1 % (ref 0–0.5)
INR PPP: 1.09 (ref 0.9–1.1)
IRON 24H UR-MRATE: 65 MCG/DL (ref 37–145)
IRON SATN MFR SERPL: 21 % (ref 20–50)
LYMPHOCYTES # BLD AUTO: 3.54 10*3/MM3 (ref 0.7–3.1)
LYMPHOCYTES NFR BLD AUTO: 31 % (ref 19.6–45.3)
MCH RBC QN AUTO: 27.9 PG (ref 26.6–33)
MCHC RBC AUTO-ENTMCNC: 32.5 G/DL (ref 31.5–35.7)
MCV RBC AUTO: 85.9 FL (ref 79–97)
MONOCYTES # BLD AUTO: 0.92 10*3/MM3 (ref 0.1–0.9)
MONOCYTES NFR BLD AUTO: 8 % (ref 5–12)
NEUTROPHILS NFR BLD AUTO: 52.8 % (ref 42.7–76)
NEUTROPHILS NFR BLD AUTO: 6.03 10*3/MM3 (ref 1.7–7)
NRBC BLD AUTO-RTO: 0.2 /100 WBC (ref 0–0.2)
PLATELET # BLD AUTO: 204 10*3/MM3 (ref 140–450)
PMV BLD AUTO: 11.1 FL (ref 6–12)
POTASSIUM SERPL-SCNC: 4.9 MMOL/L (ref 3.5–5.2)
PROT SERPL-MCNC: 7 G/DL (ref 6–8.5)
PROTHROMBIN TIME: 13.9 SECONDS (ref 11.7–14.2)
RBC # BLD AUTO: 3.4 10*6/MM3 (ref 3.77–5.28)
SODIUM SERPL-SCNC: 133 MMOL/L (ref 136–145)
TIBC SERPL-MCNC: 307 MCG/DL (ref 298–536)
TRANSFERRIN SERPL-MCNC: 206 MG/DL (ref 200–360)
VIT B12 BLD-MCNC: 1999 PG/ML (ref 211–946)
WBC # BLD AUTO: 11.43 10*3/MM3 (ref 3.4–10.8)

## 2021-11-06 PROCEDURE — 84466 ASSAY OF TRANSFERRIN: CPT | Performed by: INTERNAL MEDICINE

## 2021-11-06 PROCEDURE — 25010000002 HEPARIN (PORCINE) PER 1000 UNITS: Performed by: INTERNAL MEDICINE

## 2021-11-06 PROCEDURE — 85730 THROMBOPLASTIN TIME PARTIAL: CPT | Performed by: INTERNAL MEDICINE

## 2021-11-06 PROCEDURE — 80053 COMPREHEN METABOLIC PANEL: CPT | Performed by: INTERNAL MEDICINE

## 2021-11-06 PROCEDURE — 85025 COMPLETE CBC W/AUTO DIFF WBC: CPT | Performed by: INTERNAL MEDICINE

## 2021-11-06 PROCEDURE — 82607 VITAMIN B-12: CPT | Performed by: INTERNAL MEDICINE

## 2021-11-06 PROCEDURE — 82962 GLUCOSE BLOOD TEST: CPT

## 2021-11-06 PROCEDURE — 63710000001 INSULIN ISOPHANE & REGULAR PER 5 UNITS: Performed by: INTERNAL MEDICINE

## 2021-11-06 PROCEDURE — 83540 ASSAY OF IRON: CPT | Performed by: INTERNAL MEDICINE

## 2021-11-06 PROCEDURE — 63710000001 INSULIN LISPRO (HUMAN) PER 5 UNITS: Performed by: INTERNAL MEDICINE

## 2021-11-06 PROCEDURE — 82746 ASSAY OF FOLIC ACID SERUM: CPT | Performed by: INTERNAL MEDICINE

## 2021-11-06 PROCEDURE — 82728 ASSAY OF FERRITIN: CPT | Performed by: INTERNAL MEDICINE

## 2021-11-06 PROCEDURE — 85610 PROTHROMBIN TIME: CPT | Performed by: INTERNAL MEDICINE

## 2021-11-06 PROCEDURE — 82272 OCCULT BLD FECES 1-3 TESTS: CPT | Performed by: INTERNAL MEDICINE

## 2021-11-06 PROCEDURE — 63710000001 INSULIN GLARGINE PER 5 UNITS: Performed by: INTERNAL MEDICINE

## 2021-11-06 RX ORDER — PANTOPRAZOLE SODIUM 40 MG/1
40 TABLET, DELAYED RELEASE ORAL
Status: DISCONTINUED | OUTPATIENT
Start: 2021-11-06 | End: 2021-11-11 | Stop reason: HOSPADM

## 2021-11-06 RX ORDER — INSULIN GLARGINE 100 [IU]/ML
15 INJECTION, SOLUTION SUBCUTANEOUS EVERY 12 HOURS SCHEDULED
Status: DISCONTINUED | OUTPATIENT
Start: 2021-11-06 | End: 2021-11-06

## 2021-11-06 RX ORDER — AMLODIPINE BESYLATE 10 MG/1
10 TABLET ORAL NIGHTLY
Status: DISCONTINUED | OUTPATIENT
Start: 2021-11-06 | End: 2021-11-11 | Stop reason: HOSPADM

## 2021-11-06 RX ORDER — WARFARIN SODIUM 7.5 MG/1
7.5 TABLET ORAL
Status: COMPLETED | OUTPATIENT
Start: 2021-11-06 | End: 2021-11-06

## 2021-11-06 RX ORDER — WARFARIN SODIUM 5 MG/1
5 TABLET ORAL
Status: DISCONTINUED | OUTPATIENT
Start: 2021-11-07 | End: 2021-11-08

## 2021-11-06 RX ADMIN — LISINOPRIL 10 MG: 10 TABLET ORAL at 08:09

## 2021-11-06 RX ADMIN — HEPARIN SODIUM 9 UNITS/KG/HR: 10000 INJECTION, SOLUTION INTRAVENOUS at 09:07

## 2021-11-06 RX ADMIN — HYDROCHLOROTHIAZIDE 12.5 MG: 12.5 CAPSULE ORAL at 08:09

## 2021-11-06 RX ADMIN — WARFARIN 7.5 MG: 7.5 TABLET ORAL at 16:55

## 2021-11-06 RX ADMIN — INSULIN GLARGINE 10 UNITS: 100 INJECTION, SOLUTION SUBCUTANEOUS at 08:09

## 2021-11-06 RX ADMIN — URSODIOL 300 MG: 300 CAPSULE ORAL at 20:02

## 2021-11-06 RX ADMIN — Medication 2000 UNITS: at 08:09

## 2021-11-06 RX ADMIN — INSULIN HUMAN 30 UNITS: 100 INJECTION, SUSPENSION SUBCUTANEOUS at 21:21

## 2021-11-06 RX ADMIN — INSULIN LISPRO 10 UNITS: 100 INJECTION, SOLUTION INTRAVENOUS; SUBCUTANEOUS at 08:09

## 2021-11-06 RX ADMIN — INSULIN LISPRO 8 UNITS: 100 INJECTION, SOLUTION INTRAVENOUS; SUBCUTANEOUS at 16:55

## 2021-11-06 RX ADMIN — PANTOPRAZOLE SODIUM 40 MG: 40 TABLET, DELAYED RELEASE ORAL at 06:00

## 2021-11-06 RX ADMIN — HEPARIN SODIUM 5100 UNITS: 5000 INJECTION INTRAVENOUS; SUBCUTANEOUS at 14:44

## 2021-11-06 RX ADMIN — GABAPENTIN 300 MG: 300 CAPSULE ORAL at 20:03

## 2021-11-06 RX ADMIN — ALLOPURINOL 100 MG: 100 TABLET ORAL at 20:02

## 2021-11-06 RX ADMIN — URSODIOL 300 MG: 300 CAPSULE ORAL at 08:09

## 2021-11-06 RX ADMIN — GABAPENTIN 300 MG: 300 CAPSULE ORAL at 08:09

## 2021-11-06 RX ADMIN — AMLODIPINE BESYLATE 10 MG: 10 TABLET ORAL at 20:02

## 2021-11-06 RX ADMIN — PANTOPRAZOLE SODIUM 40 MG: 40 TABLET, DELAYED RELEASE ORAL at 16:55

## 2021-11-06 RX ADMIN — INSULIN LISPRO 14 UNITS: 100 INJECTION, SOLUTION INTRAVENOUS; SUBCUTANEOUS at 11:37

## 2021-11-06 RX ADMIN — SODIUM CHLORIDE, PRESERVATIVE FREE 10 ML: 5 INJECTION INTRAVENOUS at 20:03

## 2021-11-06 NOTE — PROGRESS NOTES
Pharmacy Consult: Warfarin Dosing/ Monitoring    Grace Renee is a 71 y.o. female, estimated creatinine clearance is 64.6 mL/min (A) (by C-G formula based on SCr of 1.18 mg/dL (H)). weighing (!) 138 kg (304 lb 9.6 oz).     has a past medical history of Diabetes mellitus (HCC), Elevated cholesterol, GERD (gastroesophageal reflux disease), Hypertension, and Sleep apnea.    Social History     Tobacco Use    Smoking status: Never Smoker    Smokeless tobacco: Never Used   Substance Use Topics    Alcohol use: Not Currently    Drug use: Never       Results from last 7 days   Lab Units 11/06/21  0345 11/06/21  0339 11/05/21  1844 11/05/21  1650 11/05/21  0545 11/04/21  1513 11/04/21  0546 11/03/21  1447 11/03/21  0717 11/03/21  0522 11/03/21  0522 11/02/21  2216 11/02/21  1541 11/02/21  1432 11/02/21  1342 11/02/21  0541 11/01/21  1914 11/01/21  1914   INR  1.09  --   --   --  1.18* 1.12*  --   --   --   --   --   --  1.46*  --   --   --   --   --    APTT seconds 146.7*  --  65.4* 44.2* 100.7*  --  88.3* 87.9* 138.0*   < > >200.0*   < >  --   --   --   --   --   --    HEMOGLOBIN g/dL  --  9.5*  --   --  9.4*  --  9.8*  --   --   --  9.6*  --  10.4*  --   --  11.8*   < > 12.5   HEMOGLOBIN, POC g/dL  --   --   --   --   --   --   --   --   --   --   --   --   --  11.6* 11.6*  --   --   --    HEMATOCRIT %  --  29.2*  --   --  30.5*  --  30.9*  --   --   --  30.7*  --  32.4*  --   --  35.6   < > 39.0   HEMATOCRIT POC %  --   --   --   --   --   --   --   --   --   --   --   --   --  34* 34*  --   --   --    PLATELETS 10*3/mm3  --  204  --   --  209  --  205  --   --   --  205  --  216  --   --  253  --  225    < > = values in this interval not displayed.     Results from last 7 days   Lab Units 11/06/21  0345 11/05/21  0545 11/02/21  0541 11/01/21 1914 11/01/21 1914   SODIUM mmol/L 133* 133* 141   < > 134*   POTASSIUM mmol/L 4.9 4.6 4.1   < > 4.7   CHLORIDE mmol/L 98 99 102   < > 97*   CO2 mmol/L 23.9 24.9 27.5   < > 22.1    BUN mg/dL 15 15 27*   < > 31*   CREATININE mg/dL 1.18* 1.05* 1.38*   < > 1.61*   CALCIUM mg/dL 9.3 8.8 9.6   < > 9.4   BILIRUBIN mg/dL 0.3  --  0.3  --  0.4   ALK PHOS U/L 95  --  97  --  101   ALT (SGPT) U/L 24  --  33  --  37*   AST (SGOT) U/L 27  --  41*  --  57*   GLUCOSE mg/dL 304* 309* 136*   < > 451*    < > = values in this interval not displayed.     Anticoagulation history: New start warfarin.     Hospital Anticoagulation:  Consulting provider: Sierra Brush MD  Start date: 11/04/21  Indication: Bilateral PE (s/p thrombectomy)  Target INR: 2-3  Bridge Therapy: Yes              and unfractionated heparin  Date 11/4 11/5 11/6          INR 1.12 1.18 1.09          Warfarin dose 5 mg 5 mg             Potential drug interactions:   - Allopurinol: May enhance the anticoagulant effect of warfarin.   - Meloxicam: May enhance the anticoagulant effect of warfarin.     Relevant nutrition status: consistent carb    Education complete?/ Date: TBD    Assessment/Plan:    1) INR further decreased. Will order warfarin 7.5 mg x1 today then continue warfarin 5 mg PO daily.  2) INR ordered daily with AM labs while inpatient.  3) Recommend continuing bridge therapy with heparin IV infusion until INR within goal range for >24 hours.     Pharmacy will continue to follow until discharge or discontinuation of warfarin.     Justa Merchant [facility]  11/6/2021

## 2021-11-06 NOTE — PROGRESS NOTES
Dr. HERMILO Coleman    [address]    11/6/2021    Patient ID:  Name:  Grace Renee  MRN:  [id number]  1950  71 y.o.  female            CC/Reason for visit: Acute submassive PE with morbid obesity CONNOR, recent hip surgery y.    Interval hx: Patient is complaining of some shortness of breath with exertion.  Denies hemoptysis.  Still on supplemental oxygen via nasal cannula    ROS: Some hip pain.  No abdominal pain.  She did have some nausea and diarrhea earlier after she ate breakfast.    I reviewed old medical records.  Past medical history, social history and family history: Unchanged from admission H&P.      Vitals:  Vitals:    11/06/21 0119 11/06/21 0647 11/06/21 0733 11/06/21 1315   BP: 150/67  119/57 120/52   BP Location: Left arm  Left arm Left arm   Patient Position: Lying  Lying Lying   Pulse: 89  75 76   Resp: 16  16 18   Temp: 98 °F (36.7 °C)  97.8 °F (36.6 °C) 98.2 °F (36.8 °C)   TempSrc: Oral  Oral Oral   SpO2: 93%  92% 93%   Weight:  (!) 138 kg (304 lb 9.6 oz)     Height:               Body mass index is 47.71 kg/m².    Intake/Output Summary (Last 24 hours) at 11/6/2021 1554  Last data filed at 11/6/2021 0300  Gross per 24 hour   Intake 720 ml   Output 1000 ml   Net -280 ml       Exam:  GEN:  No distress  Alert, oriented x 3.   LUNGS: Diminished throughout clear breath sounds bilat, no use of accessory muscles  CV:  Normal S1S2, without murmur, 1+ ankle edema  ABD:  Non tender, morbid obesity hampers rest of abdominal exam      Scheduled meds:  allopurinol, 100 mg, Oral, Nightly  amLODIPine, 10 mg, Oral, Nightly  cholecalciferol, 2,000 Units, Oral, Daily  gabapentin, 300 mg, Oral, BID  insulin lispro, 0-14 Units, Subcutaneous, TID AC  insulin NPH-insulin regular, 30 Units, Subcutaneous, Nightly  [START ON 11/7/2021] insulin NPH-insulin regular, 50 Units, Subcutaneous, QAM  pantoprazole, 40 mg, Oral, BID AC  sodium chloride, 10 mL, Intravenous, Q12H  ursodiol, 300 mg, Oral,  BID  [START ON 11/7/2021] warfarin, 5 mg, Oral, Daily  warfarin, 7.5 mg, Oral, Once      IV meds:                      heparin (porcine), 18 Units/kg/hr, Last Rate: 11 Units/kg/hr (11/06/21 1444)  Pharmacy to dose warfarin,         Data Review:   I reviewed the patient's medications and new clinical results.    COVID19   Date Value Ref Range Status   11/01/2021 Not Detected Not Detected - Ref. Range Final         Lab Results   Component Value Date    CALCIUM 9.3 11/06/2021    MG 1.7 11/05/2021     Results from last 7 days   Lab Units 11/06/21  0345 11/06/21  0339 11/05/21  0545 11/04/21  1513 11/04/21  0546 11/02/21  1541 11/02/21  1342 11/02/21  0541 11/01/21  1914 11/01/21  1914   SODIUM mmol/L 133*  --  133*  --   --   --   --  141   < > 134*   POTASSIUM mmol/L 4.9  --  4.6  --   --   --   --  4.1   < > 4.7   CHLORIDE mmol/L 98  --  99  --   --   --   --  102   < > 97*   CO2 mmol/L 23.9  --  24.9  --   --   --   --  27.5   < > 22.1   BUN mg/dL 15  --  15  --   --   --   --  27*   < > 31*   CREATININE mg/dL 1.18*  --  1.05*  --   --   --   --  1.38*   < > 1.61*   CALCIUM mg/dL 9.3  --  8.8  --   --   --   --  9.6   < > 9.4   BILIRUBIN mg/dL 0.3  --   --   --   --   --   --  0.3  --  0.4   ALK PHOS U/L 95  --   --   --   --   --   --  97  --  101   ALT (SGPT) U/L 24  --   --   --   --   --   --  33  --  37*   AST (SGOT) U/L 27  --   --   --   --   --   --  41*  --  57*   GLUCOSE mg/dL 304*  --  309*  --   --   --   --  136*   < > 451*   WBC 10*3/mm3  --  11.43* 9.45  --  9.87  --    < > 11.86*   < > 12.92*   HEMOGLOBIN g/dL  --  9.5* 9.4*  --  9.8*  --    < > 11.8*   < > 12.5   HEMOGLOBIN, POC   --   --   --   --   --   --    < >  --   --   --    PLATELETS 10*3/mm3  --  204 209  --  205  --    < > 253   < > 225   INR  1.09  --  1.18* 1.12*  --    < >   < >  --   --   --    PROBNP pg/mL  --   --   --   --   --   --   --   --   --  99.8    < > = values in this interval not displayed.             ASSESSMENT:      Bilateral pulmonary embolism (HCC)    Syncope    Left hip pain    Carpal tunnel syndrome, bilateral    Sleep apnea    Hypertension    GERD (gastroesophageal reflux disease)    Elevated cholesterol    Type 2 diabetes mellitus, with long-term current use of insulin (HCC)    DINAH (acute kidney injury) (HCC)    Acute respiratory failure with hypoxia (HCC)    Anemia    Osteoarthritis of left hip        PLAN:  Continues to improve.  Had catheter-based thrombectomy of the pulmonary artery for her pulmonary embolism.  May transition to warfarin.  Currently on heparin.  Monitor INR.  Continue weaning oxygen.  Patient needs higher dosages of insulin.        Reji Coleman MD  11/6/2021

## 2021-11-06 NOTE — PROGRESS NOTES
Name: Graec Renee ADMIT: 2021   : 1950  PCP: Jani Sun MD    MRN: [id number] LOS: 4 days   AGE/SEX: 71 y.o. female  ROOM: [facility]     Subjective   Subjective   Complaining of heartburn and indigestion and epigastric pain.  No nausea or vomiting.  No bowel movement today.  No shortness of breath.  No chest pain.  Positive occasional dry cough..  No fever or chills.  No hemoptysis.  No PND.  No palpitation.  No ankle edema.  No more loss of consciousness.  No focal neurological symptoms.  Positive for occasional left hip pain.    Review of Systems    .  No dysuria or hematuria.       Objective   Objective   Vital Signs  Temp:  [97.8 °F (36.6 °C)-98.6 °F (37 °C)] 97.8 °F (36.6 °C)  Heart Rate:  [75-89] 75  Resp:  [16-20] 16  BP: (109-150)/(51-97) 119/57  SpO2:  [92 %-98 %] 92 %  on  Flow (L/min):  [1-2] 2;   Device (Oxygen Therapy): nasal cannula    Intake/Output Summary (Last 24 hours) at 2021 1156  Last data filed at 2021 0300  Gross per 24 hour   Intake 1200 ml   Output 1000 ml   Net 200 ml     Body mass index is 47.71 kg/m².      21  1609 21  0500 21  0647   Weight: 127 kg (280 lb) 128 kg (282 lb) (!) 138 kg (304 lb 9.6 oz)     Physical Exam    General.  Middle-aged female.  Obese.  Alert and oriented x3.  No respiratory distress..  No pain.  No diaphoresis.  Will check continuously.  Eyes.  Pupils equal round and reactive.  Intact extraocular musculature.  No pallor or jaundice.  Normal conjunctive and lids.  Oral cavity.  Moist mucous membrane.   Neck.  Supple.  No JVD.  No lymphadenopathy or thyromegaly.  Cardiovascular.  Regular rate and rhythm.  No gallops or murmurs.  Chest.  Poor but clear to auscultation bilaterally with no added sounds.  Abdomen.  Obese.  Soft lax.  No tenderness.  No organomegaly.  No guarding or rebound.  Extremities.  +1 bilateral lower extremity edema.  No clubbing or cyanosis.  CNS.  No acute focal neurological  deficits.    Results Review:      Results from last 7 days   Lab Units 11/06/21  0345 11/05/21  0545 11/02/21  0541 11/01/21  1914   SODIUM mmol/L 133* 133* 141 134*   POTASSIUM mmol/L 4.9 4.6 4.1 4.7   CHLORIDE mmol/L 98 99 102 97*   CO2 mmol/L 23.9 24.9 27.5 22.1   BUN mg/dL 15 15 27* 31*   CREATININE mg/dL 1.18* 1.05* 1.38* 1.61*   GLUCOSE mg/dL 304* 309* 136* 451*   CALCIUM mg/dL 9.3 8.8 9.6 9.4   AST (SGOT) U/L 27  --  41* 57*   ALT (SGPT) U/L 24  --  33 37*     Estimated Creatinine Clearance: 64.6 mL/min (A) (by C-G formula based on SCr of 1.18 mg/dL (H)).  Results from last 7 days   Lab Units 11/02/21  0144   HEMOGLOBIN A1C % 9.27*     Results from last 7 days   Lab Units 11/06/21  1126 11/06/21  1125 11/06/21  0638 11/05/21  2123 11/05/21  1636 11/05/21  1054 11/05/21  0632 11/04/21  2108   GLUCOSE mg/dL 380* 401* 331* 399* 362* 375* 334* 279*     Results from last 7 days   Lab Units 11/02/21  0541 11/01/21  2148 11/01/21  1914   TROPONIN T ng/mL 0.249* 0.185* 0.033*     Results from last 7 days   Lab Units 11/01/21  1914   PROBNP pg/mL 99.8         Results from last 7 days   Lab Units 11/05/21  0545   MAGNESIUM mg/dL 1.7           Invalid input(s): LDLCALC  Results from last 7 days   Lab Units 11/06/21  0339 11/05/21  0545 11/05/21  0545 11/04/21  0546 11/04/21  0546 11/03/21  0522 11/03/21  0522 11/02/21  1541 11/02/21  1541 11/02/21  1432 11/02/21  1342 11/02/21  0541 11/02/21  0541 11/01/21 1914 11/01/21 1914   0000   WBC 10*3/mm3 11.43*  --  9.45  --  9.87  --  10.38  --  11.11*  --   --   --  11.86*  --  12.92*  --    HEMOGLOBIN g/dL 9.5*  --  9.4*  --  9.8*  --  9.6*  --  10.4*  --   --   --  11.8*   < > 12.5  --    HEMOGLOBIN, POC g/dL  --   --   --   --   --   --   --   --   --  11.6* 11.6*  --   --   --   --   --    HEMATOCRIT % 29.2*  --  30.5*  --  30.9*  --  30.7*  --  32.4*  --   --   --  35.6   < > 39.0  --    HEMATOCRIT POC %  --   --   --   --   --   --   --   --   --  34* 34*  --   --    --   --   --    PLATELETS 10*3/mm3 204  --  209  --  205  --  205  --  216  --   --   --  253  --  225  --    MCV fL 85.9   < > 88.9   < > 88.0   < > 89.5   < > 88.0  --   --    < > 85.0   < > 88.2  --    MCH pg 27.9   < > 27.4   < > 27.9   < > 28.0   < > 28.3  --   --    < > 28.2   < > 28.3  --    MCHC g/dL 32.5   < > 30.8*   < > 31.7   < > 31.3*   < > 32.1  --   --    < > 33.1   < > 32.1  --    RDW % 12.6   < > 12.6   < > 12.5   < > 12.7   < > 12.6  --   --    < > 12.8   < > 12.4  --    RDW-SD fl 38.1   < > 40.7   < > 39.7   < > 41.7   < > 40.4  --   --    < > 38.4   < > 39.4  --    MPV fL 11.1   < > 11.9   < > 10.7   < > 11.3   < > 11.2  --   --    < > 11.0   < > 10.8  --    NEUTROPHIL % % 52.8   < > 54.7   < > 53.0   < > 60.9   < > 70.6  --   --   --   --   --  70.8   < >   LYMPHOCYTE % % 31.0   < > 27.8   < > 28.8   < > 21.9   < > 19.0*  --   --   --   --   --  18.1*   < >   MONOCYTES % % 8.0   < > 9.2   < > 9.4   < > 9.4   < > 7.1  --   --   --   --   --  6.8   < >   EOSINOPHIL % % 6.2   < > 6.5*   < > 7.3*   < > 6.2   < > 2.0  --   --   --   --   --  3.0   < >   BASOPHIL % % 1.0   < > 1.0   < > 1.0   < > 1.2   < > 0.9  --   --   --   --   --  0.8   < >   IMM GRAN % % 1.0*   < > 0.8*   < > 0.5   < > 0.4   < > 0.4  --   --   --   --   --   --   --    NEUTROS ABS 10*3/mm3 6.03   < > 5.17   < > 5.23   < > 6.33   < > 7.85*  --   --   --   --   --  9.14*   < >   LYMPHS ABS 10*3/mm3 3.54*   < > 2.63   < > 2.84   < > 2.27   < > 2.11  --   --   --   --   --  2.34   < >   MONOS ABS 10*3/mm3 0.92*   < > 0.87   < > 0.93*   < > 0.98*   < > 0.79  --   --   --   --   --  0.88   < >   EOS ABS 10*3/mm3 0.71*   < > 0.61*   < > 0.72*   < > 0.64*   < > 0.22  --   --   --   --   --  0.39   < >   BASOS ABS 10*3/mm3 0.11   < > 0.09   < > 0.10   < > 0.12   < > 0.10  --   --   --   --   --  0.10   < >   IMMATURE GRANS (ABS) 10*3/mm3 0.12*   < > 0.08*   < > 0.05   < > 0.04   < > 0.04  --   --   --   --   --   --   --    NRBC /100 WBC  0.2   < > 0.1   < > 0.1   < > 0.0   < > 0.1  --   --   --   --   --   --   --     < > = values in this interval not displayed.     Results from last 7 days   Lab Units 11/06/21  0345 11/05/21  1844 11/05/21  1650 11/05/21  0545 11/04/21  1513 11/04/21  0546 11/03/21  1447 11/03/21  0717 11/02/21  2216 11/02/21  1541   INR  1.09  --   --  1.18* 1.12*  --   --   --   --  1.46*   APTT seconds 146.7* 65.4* 44.2* 100.7*  --  88.3* 87.9* 138.0*   < >  --     < > = values in this interval not displayed.                         Results from last 7 days   Lab Units 11/01/21 1954   ADENOVIRUS DETECTION BY PCR  Not Detected   CORONAVIRUS 229E  Not Detected   CORONAVIRUS HKU1  Not Detected   CORONAVIRUS NL63  Not Detected   CORONAVIRUS OC43  Not Detected   HUMAN METAPNEUMOVIRUS  Not Detected   HUMAN RHINOVIRUS/ENTEROVIRUS  Not Detected   INFLUENZA B PCR  Not Detected   PARAINFLUENZA 1  Not Detected   PARAINFLUENZA VIRUS 2  Not Detected   PARAINFLUENZA VIRUS 3  Not Detected   PARAINFLUENZA VIRUS 4  Not Detected   BORDETELLA PERTUSSIS PCR  Not Detected   CHLAMYDOPHILA PNEUMONIAE PCR  Not Detected   MYCOPLAMA PNEUMO PCR  Not Detected   INFLUENZA A PCR  Not Detected   RSV, PCR  Not Detected               Imaging:  Imaging Results (Last 24 Hours)     Procedure Component Value Units Date/Time    XR Hip With or Without Pelvis 2 - 3 View Left [[id number]] Collected: 11/05/21 1543     Updated: 11/05/21 1548    Narrative:      XR HIP W OR WO PELVIS 2-3 VIEW LEFT-     INDICATIONS: Pain, no injury     TECHNIQUE: 6 images were obtained, including the pelvis and left hip     COMPARISON: None available     FINDINGS:     Severe left and moderate to severe right hip joint space narrowing  noted. Degenerative subcortical eburnation is seen on both sides of the  left hip joint, with marginal spurring. Assessment somewhat limited by  difficulty with patient positioning and body habitus. Degenerative  changes are also noted in the partly included  lumbar spine.  Follow-up/further evaluation can be obtained as indications persist.       Impression:         As described.     This report was finalized on 11/5/2021 3:45 PM by Dr. Deniz Jimenez M.D.                I reviewed the patient's new clinical results / labs / tests / procedures      Assessment/Plan     Active Hospital Problems    Diagnosis  POA   • **Bilateral pulmonary embolism (HCC) [I26.99]  Yes   • Anemia [D64.9]  No   • Acute respiratory failure with hypoxia (HCC) [J96.01]  Yes   • Left hip pain [M25.552]  Yes   • Carpal tunnel syndrome, bilateral [G56.03]  Yes   • Sleep apnea [G47.30]  Yes   • Hypertension [I10]  Yes   • GERD (gastroesophageal reflux disease) [K21.9]  Yes   • Elevated cholesterol [E78.00]  Yes   • Type 2 diabetes mellitus, with long-term current use of insulin (HCC) [E11.9, Z79.4]  Not Applicable   • DINAH (acute kidney injury) (HCC) [N17.9]  Yes   • Syncope [R55]  Yes      Resolved Hospital Problems   No resolved problems to display.           Syncope secondary to bilateral pulmonary emboli with cor pulmonale and right ventricular stain leading to acute hypoxemic respiratory failure and hypotension.  Status post mechanical thrombectomy.  Currently on heparin being transitioned to Coumadin.  Cardiology is following and believes this is an acute on top of chronic blood moderate emboli as there has not been any significant change in the pulmonary pressures.  Lower extremity venous ultrasound is negative.  Pulmonary also on board.  Both consults appreciated.  Continue to wean off oxygen.  Acute kidney failure.  Most likely secondary to prerenal azotemia because of hypotension at admission and the patient was taking ACE inhibitors.  Kidney function initially improved but worsened again after initiation of lisinopril.  We will stop lisinopril and hydrochlorothiazide.  Increase Norvasc and follow-up.  Appears euvolemic.  Continue monitoring.  History of hypertension and elevated  troponin.  Chest pain is atypical has since resolved..  Troponin elevation mostly secondary to PE.  Good blood pressure control with no evidence of angina or congestive heart failure on at this time we will stop hydrochlorothiazide and lisinopril secondary to relapse of the acute kidney failure.  Will increase Norvasc and monitor.  Type 2 diabetes.  A1c 9.2.  Elevated Accu-Cheks.  Will place the patient back on her original dose of NPH 1730 and continue sliding scale insulin.    Obstructive sleep apnea.  Continue CPAP.  Chronic disease anemia.  Hemoglobin is stable.  Motility disorder.  On PT.  PT recommends skilled facility.  GERD.  Will increase Protonix and continue as needed Tums.  Left hip osteoarthritis.  Continue Tylenol and      Discussed with patient/nurse.      Laci Gordon MD  [facility]  11/06/21  11:56 EDT

## 2021-11-06 NOTE — PLAN OF CARE
Goal Outcome Evaluation:  Plan of Care Reviewed With: patient        Progress: improving  Outcome Summary: Continue on Heparin gtt.No pain or discomfort voiced.

## 2021-11-07 LAB
ANION GAP SERPL CALCULATED.3IONS-SCNC: 17.1 MMOL/L (ref 5–15)
APTT PPP: 68.5 SECONDS (ref 22.7–35.4)
APTT PPP: >200 SECONDS (ref 22.7–35.4)
BASOPHILS # BLD AUTO: 0.14 10*3/MM3 (ref 0–0.2)
BASOPHILS NFR BLD AUTO: 1.2 % (ref 0–1.5)
BUN SERPL-MCNC: 16 MG/DL (ref 8–23)
BUN/CREAT SERPL: 13.2 (ref 7–25)
CALCIUM SPEC-SCNC: 9 MG/DL (ref 8.6–10.5)
CHLORIDE SERPL-SCNC: 98 MMOL/L (ref 98–107)
CO2 SERPL-SCNC: 18.9 MMOL/L (ref 22–29)
CREAT SERPL-MCNC: 1.21 MG/DL (ref 0.57–1)
DEPRECATED RDW RBC AUTO: 40.6 FL (ref 37–54)
EOSINOPHIL # BLD AUTO: 0.67 10*3/MM3 (ref 0–0.4)
EOSINOPHIL NFR BLD AUTO: 5.9 % (ref 0.3–6.2)
ERYTHROCYTE [DISTWIDTH] IN BLOOD BY AUTOMATED COUNT: 12.9 % (ref 12.3–15.4)
GFR SERPL CREATININE-BSD FRML MDRD: 53 ML/MIN/1.73
GLUCOSE BLDC GLUCOMTR-MCNC: 155 MG/DL (ref 70–130)
GLUCOSE BLDC GLUCOMTR-MCNC: 184 MG/DL (ref 70–130)
GLUCOSE BLDC GLUCOMTR-MCNC: 185 MG/DL (ref 70–130)
GLUCOSE BLDC GLUCOMTR-MCNC: 230 MG/DL (ref 70–130)
GLUCOSE BLDC GLUCOMTR-MCNC: 266 MG/DL (ref 70–130)
GLUCOSE SERPL-MCNC: 138 MG/DL (ref 65–99)
HCT VFR BLD AUTO: 30.5 % (ref 34–46.6)
HGB BLD-MCNC: 9.7 G/DL (ref 12–15.9)
IMM GRANULOCYTES # BLD AUTO: 0.14 10*3/MM3 (ref 0–0.05)
IMM GRANULOCYTES NFR BLD AUTO: 1.2 % (ref 0–0.5)
INR PPP: 1.23 (ref 0.9–1.1)
LYMPHOCYTES # BLD AUTO: 3.51 10*3/MM3 (ref 0.7–3.1)
LYMPHOCYTES NFR BLD AUTO: 30.7 % (ref 19.6–45.3)
MCH RBC QN AUTO: 27.8 PG (ref 26.6–33)
MCHC RBC AUTO-ENTMCNC: 31.8 G/DL (ref 31.5–35.7)
MCV RBC AUTO: 87.4 FL (ref 79–97)
MONOCYTES # BLD AUTO: 0.99 10*3/MM3 (ref 0.1–0.9)
MONOCYTES NFR BLD AUTO: 8.6 % (ref 5–12)
NEUTROPHILS NFR BLD AUTO: 52.4 % (ref 42.7–76)
NEUTROPHILS NFR BLD AUTO: 6 10*3/MM3 (ref 1.7–7)
NRBC BLD AUTO-RTO: 0.2 /100 WBC (ref 0–0.2)
PLATELET # BLD AUTO: 199 10*3/MM3 (ref 140–450)
PMV BLD AUTO: 11.8 FL (ref 6–12)
POTASSIUM SERPL-SCNC: 5.1 MMOL/L (ref 3.5–5.2)
PROTHROMBIN TIME: 15.3 SECONDS (ref 11.7–14.2)
RBC # BLD AUTO: 3.49 10*6/MM3 (ref 3.77–5.28)
SODIUM SERPL-SCNC: 134 MMOL/L (ref 136–145)
WBC # BLD AUTO: 11.45 10*3/MM3 (ref 3.4–10.8)

## 2021-11-07 PROCEDURE — 80048 BASIC METABOLIC PNL TOTAL CA: CPT | Performed by: INTERNAL MEDICINE

## 2021-11-07 PROCEDURE — 25010000002 HEPARIN (PORCINE) PER 1000 UNITS: Performed by: INTERNAL MEDICINE

## 2021-11-07 PROCEDURE — 94799 UNLISTED PULMONARY SVC/PX: CPT

## 2021-11-07 PROCEDURE — 63710000001 INSULIN LISPRO (HUMAN) PER 5 UNITS: Performed by: INTERNAL MEDICINE

## 2021-11-07 PROCEDURE — 85730 THROMBOPLASTIN TIME PARTIAL: CPT | Performed by: INTERNAL MEDICINE

## 2021-11-07 PROCEDURE — 85025 COMPLETE CBC W/AUTO DIFF WBC: CPT | Performed by: INTERNAL MEDICINE

## 2021-11-07 PROCEDURE — 82962 GLUCOSE BLOOD TEST: CPT

## 2021-11-07 PROCEDURE — 63710000001 INSULIN ISOPHANE & REGULAR PER 5 UNITS: Performed by: INTERNAL MEDICINE

## 2021-11-07 PROCEDURE — 85610 PROTHROMBIN TIME: CPT | Performed by: INTERNAL MEDICINE

## 2021-11-07 RX ADMIN — ALLOPURINOL 100 MG: 100 TABLET ORAL at 20:33

## 2021-11-07 RX ADMIN — HEPARIN SODIUM 5100 UNITS: 5000 INJECTION INTRAVENOUS; SUBCUTANEOUS at 10:15

## 2021-11-07 RX ADMIN — URSODIOL 300 MG: 300 CAPSULE ORAL at 08:26

## 2021-11-07 RX ADMIN — INSULIN LISPRO 8 UNITS: 100 INJECTION, SOLUTION INTRAVENOUS; SUBCUTANEOUS at 11:50

## 2021-11-07 RX ADMIN — GABAPENTIN 300 MG: 300 CAPSULE ORAL at 20:33

## 2021-11-07 RX ADMIN — Medication 2000 UNITS: at 08:26

## 2021-11-07 RX ADMIN — ANTACID TABLETS 1 TABLET: 500 TABLET, CHEWABLE ORAL at 11:55

## 2021-11-07 RX ADMIN — INSULIN HUMAN 50 UNITS: 100 INJECTION, SUSPENSION SUBCUTANEOUS at 06:34

## 2021-11-07 RX ADMIN — SODIUM CHLORIDE, PRESERVATIVE FREE 10 ML: 5 INJECTION INTRAVENOUS at 20:33

## 2021-11-07 RX ADMIN — WARFARIN SODIUM 5 MG: 5 TABLET ORAL at 16:32

## 2021-11-07 RX ADMIN — HEPARIN SODIUM 11 UNITS/KG/HR: 10000 INJECTION, SOLUTION INTRAVENOUS at 04:57

## 2021-11-07 RX ADMIN — HEPARIN SODIUM 11 UNITS/KG/HR: 10000 INJECTION, SOLUTION INTRAVENOUS at 22:28

## 2021-11-07 RX ADMIN — PANTOPRAZOLE SODIUM 40 MG: 40 TABLET, DELAYED RELEASE ORAL at 16:33

## 2021-11-07 RX ADMIN — GABAPENTIN 300 MG: 300 CAPSULE ORAL at 08:26

## 2021-11-07 RX ADMIN — PANTOPRAZOLE SODIUM 40 MG: 40 TABLET, DELAYED RELEASE ORAL at 06:34

## 2021-11-07 RX ADMIN — INSULIN HUMAN 30 UNITS: 100 INJECTION, SUSPENSION SUBCUTANEOUS at 21:43

## 2021-11-07 RX ADMIN — HYDROCODONE BITARTRATE AND ACETAMINOPHEN 1 TABLET: 5; 325 TABLET ORAL at 06:57

## 2021-11-07 RX ADMIN — INSULIN LISPRO 3 UNITS: 100 INJECTION, SOLUTION INTRAVENOUS; SUBCUTANEOUS at 16:32

## 2021-11-07 RX ADMIN — AMLODIPINE BESYLATE 10 MG: 10 TABLET ORAL at 20:33

## 2021-11-07 RX ADMIN — URSODIOL 300 MG: 300 CAPSULE ORAL at 20:33

## 2021-11-07 NOTE — PROGRESS NOTES
Dr. HERMILO Coleman    [address]    11/7/2021    Patient ID:  Name:  Grace Renee  MRN:  [id number]  1950  71 y.o.  female            CC/Reason for visit: Acute submassive PE with morbid obesity CONNOR, recent hip surgery y.     Interval hx: Patient is feeling better.  Epigastric discomfort has improved after Tums.  No shortness of breath at rest.  Still on 1 L supplemental oxygen while I was in the room conversing with her     ROS: Some hip pain.  No cough, no hemoptysis.  No abdominal pain.    Vitals:  Vitals:    11/06/21 1910 11/06/21 2332 11/07/21 0617 11/07/21 0814   BP: 110/73 128/54  146/74   BP Location: Left arm Left arm  Left arm   Patient Position: Lying Lying  Lying   Pulse: 85 79  80   Resp: 20 18  18   Temp: 98.1 °F (36.7 °C) 98.2 °F (36.8 °C)  97.9 °F (36.6 °C)   TempSrc: Oral Oral  Oral   SpO2: 94% 92%  94%   Weight:   (!) 141 kg (310 lb 13.6 oz)    Height:               Body mass index is 48.69 kg/m².    Intake/Output Summary (Last 24 hours) at 11/7/2021 1218  Last data filed at 11/7/2021 0845  Gross per 24 hour   Intake 480 ml   Output --   Net 480 ml       Exam:  GEN:  No distress, obese  Alert, oriented x 3.   LUNGS: Diminished but clear breath sounds bilat, no use of accessory muscles  CV:  Normal S1S2, without murmur, no edema  ABD:  Non tender, obesity hampers rest of the exam      Scheduled meds:  allopurinol, 100 mg, Oral, Nightly  amLODIPine, 10 mg, Oral, Nightly  cholecalciferol, 2,000 Units, Oral, Daily  gabapentin, 300 mg, Oral, BID  insulin lispro, 0-14 Units, Subcutaneous, TID AC  insulin NPH-insulin regular, 30 Units, Subcutaneous, Nightly  insulin NPH-insulin regular, 50 Units, Subcutaneous, QAM  pantoprazole, 40 mg, Oral, BID AC  sodium chloride, 10 mL, Intravenous, Q12H  ursodiol, 300 mg, Oral, BID  warfarin, 5 mg, Oral, Daily      IV meds:                      heparin (porcine), 18 Units/kg/hr, Last Rate: 13 Units/kg/hr (11/07/21 1015)  Pharmacy to dose  warfarin,         Data Review:   I reviewed the patient's medications and new clinical results.    COVID19   Date Value Ref Range Status   11/01/2021 Not Detected Not Detected - Ref. Range Final         Lab Results   Component Value Date    CALCIUM 9.0 11/07/2021    MG 1.7 11/05/2021     Results from last 7 days   Lab Units 11/07/21  0649 11/06/21  0345 11/06/21  0339 11/05/21  0545 11/02/21  1342 11/02/21  0541 11/01/21  1914 11/01/21  1914   SODIUM mmol/L 134* 133*  --  133*  --  141   < > 134*   POTASSIUM mmol/L 5.1 4.9  --  4.6  --  4.1   < > 4.7   CHLORIDE mmol/L 98 98  --  99  --  102   < > 97*   CO2 mmol/L 18.9* 23.9  --  24.9  --  27.5   < > 22.1   BUN mg/dL 16 15  --  15  --  27*   < > 31*   CREATININE mg/dL 1.21* 1.18*  --  1.05*  --  1.38*   < > 1.61*   CALCIUM mg/dL 9.0 9.3  --  8.8  --  9.6   < > 9.4   BILIRUBIN mg/dL  --  0.3  --   --   --  0.3  --  0.4   ALK PHOS U/L  --  95  --   --   --  97  --  101   ALT (SGPT) U/L  --  24  --   --   --  33  --  37*   AST (SGOT) U/L  --  27  --   --   --  41*  --  57*   GLUCOSE mg/dL 138* 304*  --  309*  --  136*   < > 451*   WBC 10*3/mm3 11.45*  --  11.43* 9.45   < > 11.86*   < > 12.92*   HEMOGLOBIN g/dL 9.7*  --  9.5* 9.4*   < > 11.8*   < > 12.5   HEMOGLOBIN, POC   --   --   --   --    < >  --   --   --    PLATELETS 10*3/mm3 199  --  204 209   < > 253   < > 225   INR  1.23* 1.09  --  1.18*   < >  --   --   --    PROBNP pg/mL  --   --   --   --   --   --   --  99.8    < > = values in this interval not displayed.               ASSESSMENT:     Bilateral pulmonary embolism (HCC)    Syncope    Left hip pain    Carpal tunnel syndrome, bilateral    Sleep apnea    Hypertension    GERD (gastroesophageal reflux disease)    Elevated cholesterol    Type 2 diabetes mellitus, with long-term current use of insulin (HCC)    DINAH (acute kidney injury) (HCC)    Acute respiratory failure with hypoxia (HCC)    Anemia    Osteoarthritis of left hip        PLAN:  Continues to improve  from the respiratory standpoint.  Now on 1 L nasal cannula.  Continue weaning oxygen.  Continue heparin and warfarin. Once warfarin is therapeutic, at 2.0 INR, she may be discharged.        Reji Coleman MD  11/7/2021

## 2021-11-07 NOTE — PROGRESS NOTES
Name: Grace Renee ADMIT: 2021   : 1950  PCP: Jani Sun MD    MRN: [id number] LOS: 5 days   AGE/SEX: 71 y.o. female  ROOM: [facility]     Subjective   Subjective   Patient feels much better.  Resolved heartburn and belching.  No abdominal pain.  No nausea or vomiting.  Normal bowel movements without constipation/diarrhea/bleeding per rectum/melena.  No shortness of breath.  No chest pain.  Positive occasional dry cough..  No fever or chills.  No hemoptysis.  No PND.  No palpitation.  No ankle edema.  No more loss of consciousness.  No focal neurological symptoms.  Positive for occasional left hip pain.    Review of Systems    .  No dysuria or hematuria.       Objective   Objective   Vital Signs  Temp:  [97.9 °F (36.6 °C)-98.2 °F (36.8 °C)] 97.9 °F (36.6 °C)  Heart Rate:  [76-85] 80  Resp:  [18-20] 18  BP: (110-146)/(52-74) 146/74  SpO2:  [92 %-94 %] 94 %  on  Flow (L/min):  [1] 1;   Device (Oxygen Therapy): nasal cannula    Intake/Output Summary (Last 24 hours) at 2021 08  Last data filed at 2021  Gross per 24 hour   Intake 240 ml   Output --   Net 240 ml     Body mass index is 48.69 kg/m².      21  0500 21  0647 21  0617   Weight: 128 kg (282 lb) (!) 138 kg (304 lb 9.6 oz) (!) 141 kg (310 lb 13.6 oz)     Physical Exam    General.  Middle-aged female.  Obese.  Alert and oriented x3.  No respiratory distress..  No pain.  No diaphoresis.   Eyes.  Pupils equal round and reactive.  Intact extraocular musculature.  No pallor or jaundice.  Normal conjunctive and lids.  Oral cavity.  Moist mucous membrane.   Neck.  Supple.  No JVD.  No lymphadenopathy or thyromegaly.  Cardiovascular.  Regular rate and rhythm.  No gallops or murmurs.  Chest.  Poor but clear to auscultation bilaterally with no added sounds.  Abdomen.  Obese.  Soft lax.  No tenderness.  No organomegaly.  No guarding or rebound.  Extremities.  +1 bilateral lower extremity edema.  No clubbing or  cyanosis.  CNS.  No acute focal neurological deficits.    Results Review:      Results from last 7 days   Lab Units 11/06/21  0345 11/05/21  0545 11/02/21  0541 11/01/21  1914   SODIUM mmol/L 133* 133* 141 134*   POTASSIUM mmol/L 4.9 4.6 4.1 4.7   CHLORIDE mmol/L 98 99 102 97*   CO2 mmol/L 23.9 24.9 27.5 22.1   BUN mg/dL 15 15 27* 31*   CREATININE mg/dL 1.18* 1.05* 1.38* 1.61*   GLUCOSE mg/dL 304* 309* 136* 451*   CALCIUM mg/dL 9.3 8.8 9.6 9.4   AST (SGOT) U/L 27  --  41* 57*   ALT (SGPT) U/L 24  --  33 37*     Estimated Creatinine Clearance: 65.4 mL/min (A) (by C-G formula based on SCr of 1.18 mg/dL (H)).  Results from last 7 days   Lab Units 11/02/21  0144   HEMOGLOBIN A1C % 9.27*     Results from last 7 days   Lab Units 11/07/21  0612 11/07/21  0255 11/06/21  2037 11/06/21  1629 11/06/21  1126 11/06/21  1125 11/06/21  0638 11/05/21  2123   GLUCOSE mg/dL 155* 184* 294* 261* 380* 401* 331* 399*     Results from last 7 days   Lab Units 11/02/21  0541 11/01/21  2148 11/01/21  1914   TROPONIN T ng/mL 0.249* 0.185* 0.033*     Results from last 7 days   Lab Units 11/01/21  1914   PROBNP pg/mL 99.8         Results from last 7 days   Lab Units 11/05/21  0545   MAGNESIUM mg/dL 1.7           Invalid input(s): LDLCALC  Results from last 7 days   Lab Units 11/06/21  0339 11/05/21  0545 11/05/21  0545 11/04/21  0546 11/04/21  0546 11/03/21  0522 11/03/21  0522 11/02/21  1541 11/02/21  1541 11/02/21  1432 11/02/21  1342 11/02/21  0541 11/02/21  0541 11/01/21 1914 11/01/21 1914   0000   WBC 10*3/mm3 11.43*  --  9.45  --  9.87  --  10.38  --  11.11*  --   --   --  11.86*  --  12.92*  --    HEMOGLOBIN g/dL 9.5*  --  9.4*  --  9.8*  --  9.6*  --  10.4*  --   --   --  11.8*   < > 12.5  --    HEMOGLOBIN, POC g/dL  --   --   --   --   --   --   --   --   --  11.6* 11.6*  --   --   --   --   --    HEMATOCRIT % 29.2*  --  30.5*  --  30.9*  --  30.7*  --  32.4*  --   --   --  35.6   < > 39.0  --    HEMATOCRIT POC %  --   --   --   --    --   --   --   --   --  34* 34*  --   --   --   --   --    PLATELETS 10*3/mm3 204  --  209  --  205  --  205  --  216  --   --   --  253  --  225  --    MCV fL 85.9   < > 88.9   < > 88.0   < > 89.5   < > 88.0  --   --    < > 85.0   < > 88.2  --    MCH pg 27.9   < > 27.4   < > 27.9   < > 28.0   < > 28.3  --   --    < > 28.2   < > 28.3  --    MCHC g/dL 32.5   < > 30.8*   < > 31.7   < > 31.3*   < > 32.1  --   --    < > 33.1   < > 32.1  --    RDW % 12.6   < > 12.6   < > 12.5   < > 12.7   < > 12.6  --   --    < > 12.8   < > 12.4  --    RDW-SD fl 38.1   < > 40.7   < > 39.7   < > 41.7   < > 40.4  --   --    < > 38.4   < > 39.4  --    MPV fL 11.1   < > 11.9   < > 10.7   < > 11.3   < > 11.2  --   --    < > 11.0   < > 10.8  --    NEUTROPHIL % % 52.8   < > 54.7   < > 53.0   < > 60.9   < > 70.6  --   --   --   --   --  70.8   < >   LYMPHOCYTE % % 31.0   < > 27.8   < > 28.8   < > 21.9   < > 19.0*  --   --   --   --   --  18.1*   < >   MONOCYTES % % 8.0   < > 9.2   < > 9.4   < > 9.4   < > 7.1  --   --   --   --   --  6.8   < >   EOSINOPHIL % % 6.2   < > 6.5*   < > 7.3*   < > 6.2   < > 2.0  --   --   --   --   --  3.0   < >   BASOPHIL % % 1.0   < > 1.0   < > 1.0   < > 1.2   < > 0.9  --   --   --   --   --  0.8   < >   IMM GRAN % % 1.0*   < > 0.8*   < > 0.5   < > 0.4   < > 0.4  --   --   --   --   --   --   --    NEUTROS ABS 10*3/mm3 6.03   < > 5.17   < > 5.23   < > 6.33   < > 7.85*  --   --   --   --   --  9.14*   < >   LYMPHS ABS 10*3/mm3 3.54*   < > 2.63   < > 2.84   < > 2.27   < > 2.11  --   --   --   --   --  2.34   < >   MONOS ABS 10*3/mm3 0.92*   < > 0.87   < > 0.93*   < > 0.98*   < > 0.79  --   --   --   --   --  0.88   < >   EOS ABS 10*3/mm3 0.71*   < > 0.61*   < > 0.72*   < > 0.64*   < > 0.22  --   --   --   --   --  0.39   < >   BASOS ABS 10*3/mm3 0.11   < > 0.09   < > 0.10   < > 0.12   < > 0.10  --   --   --   --   --  0.10   < >   IMMATURE GRANS (ABS) 10*3/mm3 0.12*   < > 0.08*   < > 0.05   < > 0.04   < > 0.04  --   --    --   --   --   --   --    NRBC /100 WBC 0.2   < > 0.1   < > 0.1   < > 0.0   < > 0.1  --   --   --   --   --   --   --     < > = values in this interval not displayed.     Results from last 7 days   Lab Units 11/06/21  2108 11/06/21  1128 11/06/21  0345 11/05/21  1844 11/05/21  1650 11/05/21  0545 11/04/21  1513 11/04/21  0546 11/02/21  2216 11/02/21  1541   INR   --   --  1.09  --   --  1.18* 1.12*  --   --  1.46*   APTT seconds 91.2* 66.2* 146.7* 65.4* 44.2* 100.7*  --  88.3*   < >  --     < > = values in this interval not displayed.                         Results from last 7 days   Lab Units 11/01/21 1954   ADENOVIRUS DETECTION BY PCR  Not Detected   CORONAVIRUS 229E  Not Detected   CORONAVIRUS HKU1  Not Detected   CORONAVIRUS NL63  Not Detected   CORONAVIRUS OC43  Not Detected   HUMAN METAPNEUMOVIRUS  Not Detected   HUMAN RHINOVIRUS/ENTEROVIRUS  Not Detected   INFLUENZA B PCR  Not Detected   PARAINFLUENZA 1  Not Detected   PARAINFLUENZA VIRUS 2  Not Detected   PARAINFLUENZA VIRUS 3  Not Detected   PARAINFLUENZA VIRUS 4  Not Detected   BORDETELLA PERTUSSIS PCR  Not Detected   CHLAMYDOPHILA PNEUMONIAE PCR  Not Detected   MYCOPLAMA PNEUMO PCR  Not Detected   INFLUENZA A PCR  Not Detected   RSV, PCR  Not Detected               Imaging:  Imaging Results (Last 24 Hours)     ** No results found for the last 24 hours. **             I reviewed the patient's new clinical results / labs / tests / procedures      Assessment/Plan     Active Hospital Problems    Diagnosis  POA   • **Bilateral pulmonary embolism (HCC) [I26.99]  Yes   • Osteoarthritis of left hip [M16.12]  Yes   • Anemia [D64.9]  No   • Acute respiratory failure with hypoxia (HCC) [J96.01]  Yes   • Left hip pain [M25.552]  Yes   • Carpal tunnel syndrome, bilateral [G56.03]  Yes   • Sleep apnea [G47.30]  Yes   • Hypertension [I10]  Yes   • GERD (gastroesophageal reflux disease) [K21.9]  Yes   • Elevated cholesterol [E78.00]  Yes   • Type 2 diabetes mellitus,  with long-term current use of insulin ([facility]) [E11.9, Z79.4]  Not Applicable   • DINAH (acute kidney injury) ([facility]) [N17.9]  Yes   • Syncope [R55]  Yes      Resolved Hospital Problems   No resolved problems to display.     Labs are pending today.      Syncope secondary to bilateral pulmonary emboli with cor pulmonale and right ventricular stain leading to acute hypoxemic respiratory failure and hypotension.  Status post mechanical thrombectomy.  Currently on heparin being transitioned to Coumadin.  Pharmacy adjusting Coumadin dose and following INR.  Cardiology is following and believes this is an acute on top of chronic blood moderate emboli as there has not been any significant change in the pulmonary pressures.  Lower extremity venous ultrasound is negative.  Pulmonary also on board.  Both consults appreciated.  Continue to wean off oxygen.  Acute kidney failure.  Most likely secondary to prerenal azotemia because of hypotension at admission and the patient was taking ACE inhibitors.  Kidney function initially improved but worsened again after initiation of lisinopril.   lisinopril and hydrochlorothiazide DC'd and Norvasc increased.  Patient appears euvolemic to mildly volume overloaded.  Awaiting BMP today..  History of hypertension and elevated troponin.  Chest pain is atypical has since resolved..  Troponin elevation mostly secondary to PE.  Good blood pressure control with no evidence of angina or congestive heart failure on at this time DC'd hydrochlorothiazide and lisinopril secondary to relapse of the acute kidney failure.  Good blood pressure control after increasing Norvasc.    Type 2 diabetes.  A1c 9.2.  Much improved Accu-Chek after switching the patient back to her original dose of NPH we will continue sliding scale insulin.    Obstructive sleep apnea.  Continue CPAP.  Chronic disease anemia.  Hemoglobin is stable.  Awaiting CBC.  Motility disorder.  On PT.  PT recommends skilled facility.  GERD.  Resolved  with increasing Protonix and on as needed Tums.  Left hip osteoarthritis.  Continue Tylenol and physical therapy.      Discussed with patient.  Disposition.  Will need skilled facility placement.      Laci Gordon MD  [facility]  11/07/21  08:38 EST

## 2021-11-07 NOTE — PLAN OF CARE
Goal Outcome Evaluation:      Vitals stable. Currently on room air- tolerating well. Heparin gtt continues.

## 2021-11-08 LAB
ANION GAP SERPL CALCULATED.3IONS-SCNC: 9 MMOL/L (ref 5–15)
APTT PPP: 114.7 SECONDS (ref 22.7–35.4)
APTT PPP: 186.5 SECONDS (ref 22.7–35.4)
APTT PPP: 70.1 SECONDS (ref 22.7–35.4)
BASOPHILS # BLD AUTO: 0.12 10*3/MM3 (ref 0–0.2)
BASOPHILS NFR BLD AUTO: 0.9 % (ref 0–1.5)
BUN SERPL-MCNC: 15 MG/DL (ref 8–23)
BUN/CREAT SERPL: 11.5 (ref 7–25)
CALCIUM SPEC-SCNC: 9.1 MG/DL (ref 8.6–10.5)
CHLORIDE SERPL-SCNC: 101 MMOL/L (ref 98–107)
CO2 SERPL-SCNC: 27 MMOL/L (ref 22–29)
CREAT SERPL-MCNC: 1.31 MG/DL (ref 0.57–1)
DEPRECATED RDW RBC AUTO: 40.6 FL (ref 37–54)
EOSINOPHIL # BLD AUTO: 0.77 10*3/MM3 (ref 0–0.4)
EOSINOPHIL NFR BLD AUTO: 5.8 % (ref 0.3–6.2)
ERYTHROCYTE [DISTWIDTH] IN BLOOD BY AUTOMATED COUNT: 13.1 % (ref 12.3–15.4)
GFR SERPL CREATININE-BSD FRML MDRD: 49 ML/MIN/1.73
GLUCOSE BLDC GLUCOMTR-MCNC: 268 MG/DL (ref 70–130)
GLUCOSE BLDC GLUCOMTR-MCNC: 343 MG/DL (ref 70–130)
GLUCOSE BLDC GLUCOMTR-MCNC: 381 MG/DL (ref 70–130)
GLUCOSE BLDC GLUCOMTR-MCNC: 77 MG/DL (ref 70–130)
GLUCOSE SERPL-MCNC: 67 MG/DL (ref 65–99)
HCT VFR BLD AUTO: 27.9 % (ref 34–46.6)
HGB BLD-MCNC: 9.1 G/DL (ref 12–15.9)
IMM GRANULOCYTES # BLD AUTO: 0.23 10*3/MM3 (ref 0–0.05)
IMM GRANULOCYTES NFR BLD AUTO: 1.7 % (ref 0–0.5)
INR PPP: 1.47 (ref 0.9–1.1)
LYMPHOCYTES # BLD AUTO: 4.65 10*3/MM3 (ref 0.7–3.1)
LYMPHOCYTES NFR BLD AUTO: 34.9 % (ref 19.6–45.3)
MCH RBC QN AUTO: 28.3 PG (ref 26.6–33)
MCHC RBC AUTO-ENTMCNC: 32.6 G/DL (ref 31.5–35.7)
MCV RBC AUTO: 86.9 FL (ref 79–97)
MONOCYTES # BLD AUTO: 1.25 10*3/MM3 (ref 0.1–0.9)
MONOCYTES NFR BLD AUTO: 9.4 % (ref 5–12)
NEUTROPHILS NFR BLD AUTO: 47.3 % (ref 42.7–76)
NEUTROPHILS NFR BLD AUTO: 6.31 10*3/MM3 (ref 1.7–7)
NRBC BLD AUTO-RTO: 0.4 /100 WBC (ref 0–0.2)
PLATELET # BLD AUTO: 254 10*3/MM3 (ref 140–450)
PMV BLD AUTO: 10.7 FL (ref 6–12)
POTASSIUM SERPL-SCNC: 4.6 MMOL/L (ref 3.5–5.2)
PROTHROMBIN TIME: 17.6 SECONDS (ref 11.7–14.2)
QT INTERVAL: 388 MS
RBC # BLD AUTO: 3.21 10*6/MM3 (ref 3.77–5.28)
SODIUM SERPL-SCNC: 137 MMOL/L (ref 136–145)
WBC # BLD AUTO: 13.33 10*3/MM3 (ref 3.4–10.8)

## 2021-11-08 PROCEDURE — 25010000002 HEPARIN (PORCINE) PER 1000 UNITS: Performed by: INTERNAL MEDICINE

## 2021-11-08 PROCEDURE — 63710000001 INSULIN LISPRO (HUMAN) PER 5 UNITS: Performed by: INTERNAL MEDICINE

## 2021-11-08 PROCEDURE — 99232 SBSQ HOSP IP/OBS MODERATE 35: CPT | Performed by: INTERNAL MEDICINE

## 2021-11-08 PROCEDURE — 97110 THERAPEUTIC EXERCISES: CPT

## 2021-11-08 PROCEDURE — 85730 THROMBOPLASTIN TIME PARTIAL: CPT | Performed by: INTERNAL MEDICINE

## 2021-11-08 PROCEDURE — 93010 ELECTROCARDIOGRAM REPORT: CPT | Performed by: INTERNAL MEDICINE

## 2021-11-08 PROCEDURE — 85025 COMPLETE CBC W/AUTO DIFF WBC: CPT | Performed by: INTERNAL MEDICINE

## 2021-11-08 PROCEDURE — 80048 BASIC METABOLIC PNL TOTAL CA: CPT | Performed by: INTERNAL MEDICINE

## 2021-11-08 PROCEDURE — 85610 PROTHROMBIN TIME: CPT | Performed by: INTERNAL MEDICINE

## 2021-11-08 PROCEDURE — 94799 UNLISTED PULMONARY SVC/PX: CPT

## 2021-11-08 PROCEDURE — 93005 ELECTROCARDIOGRAM TRACING: CPT | Performed by: INTERNAL MEDICINE

## 2021-11-08 PROCEDURE — 82962 GLUCOSE BLOOD TEST: CPT

## 2021-11-08 PROCEDURE — 63710000001 INSULIN ISOPHANE & REGULAR PER 5 UNITS: Performed by: INTERNAL MEDICINE

## 2021-11-08 RX ORDER — WARFARIN SODIUM 7.5 MG/1
7.5 TABLET ORAL
Status: COMPLETED | OUTPATIENT
Start: 2021-11-08 | End: 2021-11-08

## 2021-11-08 RX ORDER — LISINOPRIL 10 MG/1
10 TABLET ORAL
Status: DISCONTINUED | OUTPATIENT
Start: 2021-11-08 | End: 2021-11-11 | Stop reason: HOSPADM

## 2021-11-08 RX ADMIN — GABAPENTIN 300 MG: 300 CAPSULE ORAL at 20:25

## 2021-11-08 RX ADMIN — SODIUM CHLORIDE, PRESERVATIVE FREE 10 ML: 5 INJECTION INTRAVENOUS at 20:26

## 2021-11-08 RX ADMIN — Medication 2000 UNITS: at 08:19

## 2021-11-08 RX ADMIN — GABAPENTIN 300 MG: 300 CAPSULE ORAL at 08:19

## 2021-11-08 RX ADMIN — PANTOPRAZOLE SODIUM 40 MG: 40 TABLET, DELAYED RELEASE ORAL at 05:55

## 2021-11-08 RX ADMIN — SODIUM CHLORIDE, PRESERVATIVE FREE 10 ML: 5 INJECTION INTRAVENOUS at 08:19

## 2021-11-08 RX ADMIN — INSULIN LISPRO 10 UNITS: 100 INJECTION, SOLUTION INTRAVENOUS; SUBCUTANEOUS at 16:55

## 2021-11-08 RX ADMIN — URSODIOL 300 MG: 300 CAPSULE ORAL at 08:19

## 2021-11-08 RX ADMIN — HEPARIN SODIUM 5100 UNITS: 5000 INJECTION INTRAVENOUS; SUBCUTANEOUS at 16:07

## 2021-11-08 RX ADMIN — ALLOPURINOL 100 MG: 100 TABLET ORAL at 20:25

## 2021-11-08 RX ADMIN — WARFARIN 7.5 MG: 7.5 TABLET ORAL at 17:00

## 2021-11-08 RX ADMIN — GLYCERIN, HYPROMELLOSE, POLYETHYLENE GLYCOL 1 DROP: .2; .2; 1 LIQUID OPHTHALMIC at 08:31

## 2021-11-08 RX ADMIN — AMLODIPINE BESYLATE 10 MG: 10 TABLET ORAL at 20:25

## 2021-11-08 RX ADMIN — INSULIN HUMAN 30 UNITS: 100 INJECTION, SUSPENSION SUBCUTANEOUS at 21:52

## 2021-11-08 RX ADMIN — PANTOPRAZOLE SODIUM 40 MG: 40 TABLET, DELAYED RELEASE ORAL at 16:56

## 2021-11-08 RX ADMIN — INSULIN LISPRO 8 UNITS: 100 INJECTION, SOLUTION INTRAVENOUS; SUBCUTANEOUS at 12:09

## 2021-11-08 RX ADMIN — URSODIOL 300 MG: 300 CAPSULE ORAL at 20:25

## 2021-11-08 RX ADMIN — HYDROCODONE BITARTRATE AND ACETAMINOPHEN 1 TABLET: 5; 325 TABLET ORAL at 03:40

## 2021-11-08 RX ADMIN — LISINOPRIL 10 MG: 10 TABLET ORAL at 14:02

## 2021-11-08 RX ADMIN — HEPARIN SODIUM 11 UNITS/KG/HR: 10000 INJECTION, SOLUTION INTRAVENOUS at 18:25

## 2021-11-08 NOTE — PROGRESS NOTES
Miguel Angel Mata MD                          [phone]      Patient ID:    Name:  Grace Renee    MRN:  [id number]    1950   71 y.o.  female            Patient Care Team:  Dm Tabor MD as PCP - General (Cardiology)    CC/ Reason for visit: Acute hypoxic aspiratory failure, acute pulmonary embolism    Subjective: Pt seen and examined this AM. No acute overnight events noted. Doing better.  On minimal supplemental oxygen which was discontinued during my evaluation.  Tolerating heparin.  Transition to Coumadin.  States that she is feeling better.    ROS: Denies any subjective fevers, syncope or presyncopal events, new neurological deficits, nausea or vomiting currently    Objective     Vital Signs past 24hrs    BP range: BP: (142-183)/() 180/68  Pulse range: Heart Rate:  [] 83  Resp rate range: Resp:  [20-22] 22  Temp range: Temp (24hrs), Av.4 °F (36.9 °C), Min:98 °F (36.7 °C), Max:98.8 °F (37.1 °C)      Ventilator/Non-Invasive Ventilation Settings (From admission, onward)            None          Device (Oxygen Therapy): room air       (!) 142 kg (312 lb 6.4 oz); Body mass index is 48.93 kg/m².      Intake/Output Summary (Last 24 hours) at 2021 1705  Last data filed at 2021 1200  Gross per 24 hour   Intake 960 ml   Output --   Net 960 ml       PHYSICAL EXAM   Constitutional: Middle aged super morbidly obese -American female pt in bed, No acute respiratory distress,+ accessory muscle use  Head: - NCAT  Eyes: No pallor.  Anicteric sclerae, EOMI.  ENMT:  Mallampati 4, no oral thrush. Moist MM.   NECK: Trachea midline, No thyromegaly, no palpable cervical lymphadenopathy  Heart: RRR, no murmur.  Trace pedal edema   Lungs: OMAR +, distant breath sounds no wheezes/ crackles heard    Abdomen: Soft.  Obese no tenderness, guarding or rigidity. No palpable masses  Extremities: Extremities warm and well perfused. No cyanosis/  clubbing  Neuro: Conscious, answers appropriately, no gross focal neuro deficits  Psych: Mood and affect appropriate    PPE recommended per [facility] infectious disease Isolation protocol for the current clinical scenario(as mentioned below) was followed.     Scheduled meds:  allopurinol, 100 mg, Oral, Nightly  amLODIPine, 10 mg, Oral, Nightly  cholecalciferol, 2,000 Units, Oral, Daily  gabapentin, 300 mg, Oral, BID  insulin lispro, 0-14 Units, Subcutaneous, TID AC  insulin NPH-insulin regular, 30 Units, Subcutaneous, Nightly  insulin NPH-insulin regular, 50 Units, Subcutaneous, QAM  lisinopril, 10 mg, Oral, Q24H  pantoprazole, 40 mg, Oral, BID AC  sodium chloride, 10 mL, Intravenous, Q12H  ursodiol, 300 mg, Oral, BID        IV meds:                      heparin (porcine), 18 Units/kg/hr, Last Rate: 11 Units/kg/hr (11/08/21 1609)  Pharmacy to dose warfarin,         Data Review:      Results from last 7 days   Lab Units 11/08/21  0542 11/07/21  0649 11/06/21  0345 11/06/21  0339 11/05/21  0545 11/02/21  1342 11/02/21  0541 11/01/21  1914 11/01/21  1914   SODIUM mmol/L 137 134* 133*  --    < >   < > 141   < > 134*   POTASSIUM mmol/L 4.6 5.1 4.9  --    < >   < > 4.1   < > 4.7   CHLORIDE mmol/L 101 98 98  --    < >   < > 102   < > 97*   CO2 mmol/L 27.0 18.9* 23.9  --    < >   < > 27.5   < > 22.1   BUN mg/dL 15 16 15  --    < >   < > 27*   < > 31*   CREATININE mg/dL 1.31* 1.21* 1.18*  --    < >   < > 1.38*   < > 1.61*   CALCIUM mg/dL 9.1 9.0 9.3  --    < >   < > 9.6   < > 9.4   BILIRUBIN mg/dL  --   --  0.3  --   --   --  0.3  --  0.4   ALK PHOS U/L  --   --  95  --   --   --  97  --  101   ALT (SGPT) U/L  --   --  24  --   --   --  33  --  37*   AST (SGOT) U/L  --   --  27  --   --   --  41*  --  57*   GLUCOSE mg/dL 67 138* 304*  --    < >   < > 136*   < > 451*   WBC 10*3/mm3 13.33* 11.45*  --  11.43*  --    < > 11.86*   < > 12.92*   HEMOGLOBIN g/dL 9.1* 9.7*  --  9.5*  --    < > 11.8*   < > 12.5   HEMOGLOBIN,  POC   --   --   --   --   --    < >  --   --   --    PLATELETS 10*3/mm3 254 199  --  204  --    < > 253   < > 225   INR  1.47* 1.23* 1.09  --    < >   < >  --   --   --    PROBNP pg/mL  --   --   --   --   --   --   --   --  99.8    < > = values in this interval not displayed.       Lab Results   Component Value Date    CALCIUM 9.1 11/08/2021                    Results Review:    I have reviewed the relevant laboratory results and independently reviewed the chest imaging from this hospitalization including the available echocardiogram reports personally and summarized it if/ when appropriate below    Assessment    Acute hypoxic respiratory failure  Acute bilateral pulmonary embolism; provoked  Acute cor pulmonale s/p aspiration thrombectomy  Pedal edema  CKD  Morbid obesity  CONNOR    PLAN:  All problems are new to me  Work-up done so far as recommendations from prior pulmonologist noted.  Patient tolerating anticoagulation and agree with plan for switching to Coumadin  Continue wean supplemental oxygen  Continue with monitoring volume status and defer diuretics to cardiology  Needs outpatient sleep apnea evaluation and treatment if interested    Okay to discharge from my standpoint.  Will need walking oximetry at discharge.  We will see for any new pulmonary or critical care issues.  Please reconsult for the same    Miguel Angel Mata MD  11/8/2021

## 2021-11-08 NOTE — PROGRESS NOTES
Name: Grace Renee ADMIT: 2021   : 1950  PCP: Jani Sun MD    MRN: [id number] LOS: 6 days   AGE/SEX: 71 y.o. female  ROOM: [facility]     Subjective   Subjective   .  No abdominal pain.  No nausea or vomiting.  Normal bowel movements without constipation/diarrhea/bleeding per rectum/melena.  Positive exertional dyspnea.  No chest pain.  Positive occasional dry cough..  No fever or chills.  No hemoptysis.  No PND.  No palpitation.  No ankle edema.  No more loss of consciousness.  No focal neurological symptoms.  Positive for occasional left hip pain.    Review of Systems    .  No dysuria or hematuria.       Objective   Objective   Vital Signs  Temp:  [97.7 °F (36.5 °C)-98.8 °F (37.1 °C)] 98.5 °F (36.9 °C)  Heart Rate:  [82-85] 85  Resp:  [20] 20  BP: (142-162)/() 147/79  SpO2:  [93 %-97 %] 95 %  on  Flow (L/min):  [1-2] 2;   Device (Oxygen Therapy): nasal cannula    Intake/Output Summary (Last 24 hours) at 2021 1020  Last data filed at 2021 0818  Gross per 24 hour   Intake 720 ml   Output --   Net 720 ml     Body mass index is 48.93 kg/m².      21  0647 21  0617 21  0639   Weight: (!) 138 kg (304 lb 9.6 oz) (!) 141 kg (310 lb 13.6 oz) (!) 142 kg (312 lb 6.4 oz)     Physical Exam    General.  Middle-aged female.  Obese.  Alert and oriented x3.  No respiratory distress..  No pain.  No diaphoresis.   Eyes.  Pupils equal round and reactive.  Intact extraocular musculature.  No pallor or jaundice.  Normal conjunctive and lids.  Oral cavity.  Moist mucous membrane.   Neck.  Supple.  No JVD.  No lymphadenopathy or thyromegaly.  Cardiovascular.  Regular rate and rhythm.  No gallops or murmurs.  Chest.  Poor but clear to auscultation bilaterally with no added sounds.  Abdomen.  Obese.  Soft lax.  No tenderness.  No organomegaly.  No guarding or rebound.  Extremities.  +1 bilateral lower extremity edema.  No clubbing or cyanosis.  CNS.  No acute focal neurological  deficits.    Results Review:      Results from last 7 days   Lab Units 11/08/21  0542 11/07/21  0649 11/06/21  0345 11/05/21  0545 11/02/21  0541 11/01/21  1914   SODIUM mmol/L 137 134* 133* 133* 141 134*   POTASSIUM mmol/L 4.6 5.1 4.9 4.6 4.1 4.7   CHLORIDE mmol/L 101 98 98 99 102 97*   CO2 mmol/L 27.0 18.9* 23.9 24.9 27.5 22.1   BUN mg/dL 15 16 15 15 27* 31*   CREATININE mg/dL 1.31* 1.21* 1.18* 1.05* 1.38* 1.61*   GLUCOSE mg/dL 67 138* 304* 309* 136* 451*   CALCIUM mg/dL 9.1 9.0 9.3 8.8 9.6 9.4   AST (SGOT) U/L  --   --  27  --  41* 57*   ALT (SGPT) U/L  --   --  24  --  33 37*     Estimated Creatinine Clearance: 59.2 mL/min (A) (by C-G formula based on SCr of 1.31 mg/dL (H)).  Results from last 7 days   Lab Units 11/02/21  0144   HEMOGLOBIN A1C % 9.27*     Results from last 7 days   Lab Units 11/08/21  0551 11/07/21  2045 11/07/21  1626 11/07/21  1113 11/07/21  0612 11/07/21  0255 11/06/21  2037 11/06/21  1629   GLUCOSE mg/dL 77 230* 185* 266* 155* 184* 294* 261*     Results from last 7 days   Lab Units 11/02/21  0541 11/01/21  2148 11/01/21  1914   TROPONIN T ng/mL 0.249* 0.185* 0.033*     Results from last 7 days   Lab Units 11/01/21  1914   PROBNP pg/mL 99.8         Results from last 7 days   Lab Units 11/05/21  0545   MAGNESIUM mg/dL 1.7           Invalid input(s): LDLCALC  Results from last 7 days   Lab Units 11/08/21  0542 11/07/21  0649 11/07/21  0649 11/06/21  0339 11/06/21  0339 11/05/21  0545 11/05/21  0545 11/04/21  0546 11/04/21  0546 11/03/21  0522 11/03/21  0522 11/02/21  1541 11/02/21  1541   WBC 10*3/mm3 13.33*  --  11.45*  --  11.43*  --  9.45  --  9.87  --  10.38  --  11.11*   HEMOGLOBIN g/dL 9.1*  --  9.7*  --  9.5*  --  9.4*  --  9.8*  --  9.6*  --  10.4*   HEMATOCRIT % 27.9*  --  30.5*  --  29.2*  --  30.5*  --  30.9*  --  30.7*  --  32.4*   PLATELETS 10*3/mm3 254  --  199  --  204  --  209  --  205  --  205  --  216   MCV fL 86.9   < > 87.4   < > 85.9   < > 88.9   < > 88.0   < > 89.5   < >  88.0   MCH pg 28.3   < > 27.8   < > 27.9   < > 27.4   < > 27.9   < > 28.0   < > 28.3   MCHC g/dL 32.6   < > 31.8   < > 32.5   < > 30.8*   < > 31.7   < > 31.3*   < > 32.1   RDW % 13.1   < > 12.9   < > 12.6   < > 12.6   < > 12.5   < > 12.7   < > 12.6   RDW-SD fl 40.6   < > 40.6   < > 38.1   < > 40.7   < > 39.7   < > 41.7   < > 40.4   MPV fL 10.7   < > 11.8   < > 11.1   < > 11.9   < > 10.7   < > 11.3   < > 11.2   NEUTROPHIL % % 47.3   < > 52.4   < > 52.8   < > 54.7   < > 53.0   < > 60.9   < > 70.6   LYMPHOCYTE % % 34.9   < > 30.7   < > 31.0   < > 27.8   < > 28.8   < > 21.9   < > 19.0*   MONOCYTES % % 9.4   < > 8.6   < > 8.0   < > 9.2   < > 9.4   < > 9.4   < > 7.1   EOSINOPHIL % % 5.8   < > 5.9   < > 6.2   < > 6.5*   < > 7.3*   < > 6.2   < > 2.0   BASOPHIL % % 0.9   < > 1.2   < > 1.0   < > 1.0   < > 1.0   < > 1.2   < > 0.9   IMM GRAN % % 1.7*   < > 1.2*   < > 1.0*   < > 0.8*   < > 0.5   < > 0.4   < > 0.4   NEUTROS ABS 10*3/mm3 6.31   < > 6.00   < > 6.03   < > 5.17   < > 5.23   < > 6.33   < > 7.85*   LYMPHS ABS 10*3/mm3 4.65*   < > 3.51*   < > 3.54*   < > 2.63   < > 2.84   < > 2.27   < > 2.11   MONOS ABS 10*3/mm3 1.25*   < > 0.99*   < > 0.92*   < > 0.87   < > 0.93*   < > 0.98*   < > 0.79   EOS ABS 10*3/mm3 0.77*   < > 0.67*   < > 0.71*   < > 0.61*   < > 0.72*   < > 0.64*   < > 0.22   BASOS ABS 10*3/mm3 0.12   < > 0.14   < > 0.11   < > 0.09   < > 0.10   < > 0.12   < > 0.10   IMMATURE GRANS (ABS) 10*3/mm3 0.23*   < > 0.14*   < > 0.12*   < > 0.08*   < > 0.05   < > 0.04   < > 0.04   NRBC /100 WBC 0.4*   < > 0.2   < > 0.2   < > 0.1   < > 0.1   < > 0.0   < > 0.1    < > = values in this interval not displayed.     Results from last 7 days   Lab Units 11/08/21  0542 11/07/21  1736 11/07/21  0649 11/06/21  2108 11/06/21  1128 11/06/21  0345 11/05/21  1844 11/05/21  1650 11/05/21  0545 11/04/21  1513 11/04/21  0546 11/02/21  2216 11/02/21  1541   INR  1.47*  --  1.23*  --   --  1.09  --   --  1.18* 1.12*  --   --  1.46*   APTT  seconds 114.7* >200.0* 68.5* 91.2* 66.2* 146.7* 65.4*   < > 100.7*  --    < >   < >  --     < > = values in this interval not displayed.                         Results from last 7 days   Lab Units 11/01/21 1954   ADENOVIRUS DETECTION BY PCR  Not Detected   CORONAVIRUS 229E  Not Detected   CORONAVIRUS HKU1  Not Detected   CORONAVIRUS NL63  Not Detected   CORONAVIRUS OC43  Not Detected   HUMAN METAPNEUMOVIRUS  Not Detected   HUMAN RHINOVIRUS/ENTEROVIRUS  Not Detected   INFLUENZA B PCR  Not Detected   PARAINFLUENZA 1  Not Detected   PARAINFLUENZA VIRUS 2  Not Detected   PARAINFLUENZA VIRUS 3  Not Detected   PARAINFLUENZA VIRUS 4  Not Detected   BORDETELLA PERTUSSIS PCR  Not Detected   CHLAMYDOPHILA PNEUMONIAE PCR  Not Detected   MYCOPLAMA PNEUMO PCR  Not Detected   INFLUENZA A PCR  Not Detected   RSV, PCR  Not Detected               Imaging:  Imaging Results (Last 24 Hours)     ** No results found for the last 24 hours. **             I reviewed the patient's new clinical results / labs / tests / procedures      Assessment/Plan     Active Hospital Problems    Diagnosis  POA   • **Bilateral pulmonary embolism (HCC) [I26.99]  Yes   • Osteoarthritis of left hip [M16.12]  Yes   • Anemia [D64.9]  No   • Acute respiratory failure with hypoxia ([facility]) [J96.01]  Yes   • Left hip pain [M25.552]  Yes   • Carpal tunnel syndrome, bilateral [G56.03]  Yes   • Sleep apnea [G47.30]  Yes   • Hypertension [I10]  Yes   • GERD (gastroesophageal reflux disease) [K21.9]  Yes   • Elevated cholesterol [E78.00]  Yes   • Type 2 diabetes mellitus, with long-term current use of insulin (HCC) [E11.9, Z79.4]  Not Applicable   • DINAH (acute kidney injury) (HCC) [N17.9]  Yes   • Syncope [R55]  Yes      Resolved Hospital Problems   No resolved problems to display.     Labs are pending today.      Syncope secondary to bilateral pulmonary emboli with cor pulmonale and right ventricular stain leading to acute hypoxemic respiratory failure and hypotension.   Status post mechanical thrombectomy.  Currently on heparin being transitioned to Coumadin.  Pharmacy adjusting Coumadin dose and following INR.  Cardiology saw patient and believes this is an acute on top of chronic blood moderate emboli as there has not been any significant change in the pulmonary pressures.  Lower extremity venous ultrasound is negative.  Pulmonary also on board.  Both consults appreciated.  Failed weaning off oxygen yesterday.  Continue to wean off oxygen.  Acute kidney failure.  No baseline creatinine available.  Most likely secondary to prerenal azotemia because of hypotension at admission and the patient was taking ACE inhibitors.  Kidney function initially improved but worsened again after initiation of lisinopril.   lisinopril and hydrochlorothiazide DC'd and Norvasc increased.  Patient appears euvolemic to mildly volume overloaded.  Stable kidney function.  Patient might have a stage III renal failure before admission.  History of hypertension and elevated troponin.  Chest pain is atypical has since resolved..  Troponin elevation mostly secondary to PE.  Good blood pressure control with no evidence of angina or congestive heart failure on at this time DC'd hydrochlorothiazide and lisinopril secondary to relapse of the acute kidney failure.  Good blood pressure control after increasing Norvasc.    Type 2 diabetes.  A1c 9.2.  Much improved Accu-Chek after switching the patient back to her original dose of NPH we will continue sliding scale insulin.    Obstructive sleep apnea.    Chronic disease anemia.  Hemoglobin is stable.  Continue to monitor.    Motility disorder.  On PT.  PT recommends skilled facility.  GERD.  Resolved with increasing Protonix and on as needed Tums.  Left hip osteoarthritis.  Continue Tylenol and physical therapy.      Discussed with patient.  Disposition.  Will need skilled facility placement.      Laci Gordon MD  [facility]  11/08/21  10:20 EST

## 2021-11-08 NOTE — THERAPY TREATMENT NOTE
Patient Name: Grace Renee  : 1950    MRN: [id number]                              Today's Date: 2021       Admit Date: 2021    Visit Dx:     ICD-10-CM ICD-9-CM   1. Syncope, unspecified syncope type  R55 780.2   2. Elevated troponin  R77.8 790.6   3. Bilateral pulmonary embolism (HCC)  I26.99 415.19     Patient Active Problem List   Diagnosis   • Syncope   • Left hip pain   • Carpal tunnel syndrome, bilateral   • Bilateral pulmonary embolism (HCC)   • Sleep apnea   • Hypertension   • GERD (gastroesophageal reflux disease)   • Elevated cholesterol   • Type 2 diabetes mellitus, with long-term current use of insulin (HCC)   • DINAH (acute kidney injury) (HCC)   • Acute respiratory failure with hypoxia (HCC)   • Anemia   • Osteoarthritis of left hip     Past Medical History:   Diagnosis Date   • Diabetes mellitus (HCC)    • Elevated cholesterol    • GERD (gastroesophageal reflux disease)    • Hypertension    • Osteoarthritis of left hip 2021   • Sleep apnea      Past Surgical History:   Procedure Laterality Date   • CARDIAC CATHETERIZATION Bilateral 2021    Procedure: Percutaneous Mechanical Thrombectomy- INARI;  Surgeon: Chris Pritchett MD;  [facility];  Service: Cardiovascular;  Laterality: Bilateral;   • CARDIAC CATHETERIZATION N/A 2021    Procedure: Right Heart Cath;  Surgeon: Chris Pritchett MD;  Location:  [facility] CATH INVASIVE LOCATION;  Service: Cardiovascular;  Laterality: N/A;   • INTERVENTIONAL RADIOLOGY PROCEDURE Bilateral 2021    Procedure: Pulmonary Angiogram;  Surgeon: Chris Pritchett MD;  Location:  [facility] CATH INVASIVE LOCATION;  Service: Cardiovascular;  Laterality: Bilateral;      General Information     Row Name 21 1553          Physical Therapy Time and Intention    Document Type therapy note (daily note)  -PH     Mode of Treatment physical therapy  -PH     Row Name 21 1553          General Information    Existing  Precautions/Restrictions fall  -PH     Row Name 11/08/21 1553          Safety Issues, Functional Mobility    Impairments Affecting Function (Mobility) endurance/activity tolerance; shortness of breath; balance; pain; strength  -PH           User Key  (r) = Recorded By, (t) = Taken By, (c) = Cosigned By    Initials Name Provider Type    Renee Cruz PTA Physical Therapy Assistant               Mobility     Row Name 11/08/21 1553          Bed Mobility    Supine-Sit [place] (Bed Mobility) unable to assess  -PH     Comment (Bed Mobility) [facility] w/ pt reporting incr dizziness today. Pt's BP taken in chair and was 183/82. RN notified and came to room w/ pt performing ther ex in chair only.  -PH     Row Name 11/08/21 1553          Bed-Chair Transfer    Bed-Chair [place] (Transfers) unable to assess  -PH     Row Name 11/08/21 [facility]          Sit-Stand Transfer    Sit-Stand [place] (Transfers) unable to assess  -PH     [facility] Name 11/08/21 1553          Gait/Stairs (Locomotion)    [place] Level (Gait) unable to assess  -PH     [place] Level (Stairs) unable to assess  -PH           User Key  (r) = Recorded By, (t) = Taken By, (c) = Cosigned By    Initials Name Provider Type    Renee Cruz PTA Physical Therapy Assistant               Obj/Interventions     Row Name 11/08/21 1555          Motor Skills    Therapeutic Exercise other (see comments)  BAP, LAQ, seated march, shoulder flex/ext; x 15 reps each; RN req PT to be discontinued 2/2 pt's BP  -PH           User Key  (r) = Recorded By, (t) = Taken By, (c) = Cosigned By    Initials Name Provider Type    Renee Cruz PTA Physical Therapy Assistant               Goals/Plan    No documentation.                Clinical Impression     Row Name 11/08/21 1555          Pain Scale: FACES Pre/Post-Treatment    Pain: FACES Scale, Pretreatment 0-->no hurt  -PH     Posttreatment Pain Rating 0-->no hurt  -PH     Row Name 11/08/21  1555          Plan of Care Review    Plan of Care Reviewed With patient  -PH     Progress no change  -PH     Outcome Summary Pt UIC w/ c/o dizziness when sitting upright. Pt's BP seated in chair was 183/82. RN notified and came to room. Pt performed a few ther ex, but was asked by RN to discontinue 2/2 pt's BP. PT will prog as pt flaquita.  -PH     Row Name 11/08/21 1555          Positioning and Restraints    Pre-Treatment Position sitting in chair/recliner  -PH     Post Treatment Position chair  -PH     Bathroom reclined; call light within reach; encouraged to call for assist; exit alarm on  -PH           User Key  (r) = Recorded By, (t) = Taken By, (c) = Cosigned By    Initials Name Provider Type     Renee Dutta PTA Physical Therapy Assistant               Outcome Measures     Row Name 11/08/21 1557          How much help from another person do you currently need...    Turning from your back to your side while in flat bed without using bedrails? 4  -PH     Moving from lying on back to sitting on the side of a flat bed without bedrails? 3  -PH     Moving to and from a bed to a chair (including a wheelchair)? 3  -PH     Standing up from a chair using your arms (e.g., wheelchair, bedside chair)? 3  -PH     Climbing 3-5 steps with a railing? 2  -PH     To walk in hospital room? 3  -PH     AM-PAC 6 Clicks Score (PT) 18  -PH     Row Name 11/08/21 1557          Functional Assessment    Outcome Measure Options AM-PAC 6 Clicks Basic Mobility (PT)  -PH           User Key  (r) = Recorded By, (t) = Taken By, (c) = Cosigned By    Initials Name Provider Type     Renee Dutta PTA Physical Therapy Assistant                             Physical Therapy Education                 Title: PT OT SLP Therapies (Done)     Topic: Physical Therapy (Done)     Point: Mobility training (Done)     Learning Progress Summary           Patient Acceptance, E,TB,D, VU,DU,NR by  at 11/7/2021 2039    Acceptance, E,D, VU by   at 11/5/2021 1225    Acceptance, E, VU by  at 11/4/2021 1357                   Point: Home exercise program (Done)     Learning Progress Summary           Patient Acceptance, E, VU,DU,NR by  at 11/8/2021 1558    Acceptance, E,TB,D, VU,DU,NR by  at 11/7/2021 2039    Acceptance, E, VU by  at 11/4/2021 1357                   Point: Body mechanics (Done)     Learning Progress Summary           Patient Acceptance, E,TB,D, VU,DU,NR by  at 11/7/2021 2039    Acceptance, E,D, VU by  at 11/5/2021 1225    Acceptance, E, VU by  at 11/4/2021 1357                   Point: Precautions (Done)     Learning Progress Summary           Patient Acceptance, E,TB,D, VU,DU,NR by  at 11/7/2021 2039    Acceptance, E,D, VU by  at 11/5/2021 1225    Acceptance, E, VU by  at 11/4/2021 1357                               User Key     Initials Effective Dates Name Provider Type Discipline     06/16/21 -  Yessenia Lares, RN Registered Nurse Nurse     06/16/21 -  Renee Dutta PTA Physical Therapy Assistant PT     06/16/21 -  Leslie Rogers PT Physical Therapist PT              PT Recommendation and Plan     Plan of Care Reviewed With: patient  Progress: no change  Outcome Summary: Pt UIC w/ c/o dizziness when sitting upright. Pt's BP seated in chair was 183/82. RN notified and came to room. Pt performed a few ther ex, but was asked by RN to discontinue 2/2 pt's BP. PT will prog as pt flaquita.     Time Calculation:    PT Charges     Row Name 11/08/21 1558             Time Calculation    Start Time 1346  -PH      Stop Time 1359  -PH      Time Calculation (min) 13 min  -PH      PT Received On 11/08/21  -PH      PT - Next Appointment 11/09/21  -PH              Timed Charges    69409 - PT Therapeutic Exercise Minutes 13  -PH              Total Minutes    Timed Charges Total Minutes 13  -PH       Total Minutes 13  -PH            User Key  (r) = Recorded By, (t) = Taken By, (c) = Cosigned By    Initials Name Provider Type     PH Renee Dutta PTA Physical Therapy Assistant              Therapy Charges for Today     Code Description Service Date Service Provider Modifiers Qty    68200485240 HC PT THER PROC EA 15 MIN 11/8/2021 Renee Dutta PTA GP 1          PT G-Codes  Outcome Measure Options: AM-PAC 6 Clicks Basic Mobility (PT)  AM-PAC 6 Clicks Score (PT): 18    Renee Dutta PTA  11/8/2021

## 2021-11-08 NOTE — PLAN OF CARE
Problem: Adult Inpatient Plan of Care  Goal: Plan of Care Review  Outcome: Ongoing, Progressing  Flowsheets (Taken 11/8/2021 1703)  Progress: improving  Plan of Care Reviewed With: patient  Outcome Summary: elevated bp lisinopri started with  no ss of distress  Goal: Absence of Hospital-Acquired Illness or Injury  Outcome: Ongoing, Progressing  Intervention: Identify and Manage Fall Risk  Recent Flowsheet Documentation  Taken 11/8/2021 1600 by Aliyah Hooks RN  Safety Promotion/Fall Prevention:   toileting scheduled   safety round/check completed   room organization consistent   clutter free environment maintained   fall prevention program maintained   lighting adjusted  Taken 11/8/2021 1407 by Aliyah Hooks RN  Safety Promotion/Fall Prevention:   toileting scheduled   safety round/check completed   room organization consistent   nonskid shoes/slippers when out of bed   fall prevention program maintained   activity supervised   clutter free environment maintained  Taken 11/8/2021 1200 by Aliyah Hooks RN  Safety Promotion/Fall Prevention:   toileting scheduled   safety round/check completed   room organization consistent   assistive device/personal items within reach   clutter free environment maintained   fall prevention program maintained   lighting adjusted  Taken 11/8/2021 1050 by Aliyah Hooks RN  Safety Promotion/Fall Prevention:   toileting scheduled   safety round/check completed   room organization consistent   nonskid shoes/slippers when out of bed   clutter free environment maintained   assistive device/personal items within reach   activity supervised  Intervention: Prevent Skin Injury  Recent Flowsheet Documentation  Taken 11/8/2021 1659 by Aliyah Hooks RN  Body Position: position maintained  Taken 11/8/2021 0818 by Aliyah Hooks RN  Body Position: position changed independently  Intervention: Prevent Infection  Recent Flowsheet Documentation  Taken 11/8/2021 1600 by Aliyah Hooks RN  Infection  Prevention:   single patient room provided   personal protective equipment utilized   environmental surveillance performed   hand hygiene promoted  Taken 11/8/2021 1407 by Aliyah Hokos RN  Infection Prevention:   environmental surveillance performed   hand hygiene promoted   personal protective equipment utilized  Taken 11/8/2021 1200 by Aliyah Hooks RN  Infection Prevention:   hand hygiene promoted   environmental surveillance performed   equipment surfaces disinfected   personal protective equipment utilized  Taken 11/8/2021 1050 by Aliyah Hooks RN  Infection Prevention:   environmental surveillance performed   hand hygiene promoted   single patient room provided  Goal: Optimal Comfort and Wellbeing  Outcome: Ongoing, Progressing  Intervention: Provide Person-Centered Care  Recent Flowsheet Documentation  Taken 11/8/2021 0818 by Aliyah Hooks RN  Trust Relationship/Rapport:   questions answered   emotional support provided   care explained   questions encouraged   reassurance provided   thoughts/feelings acknowledged  Goal: Readiness for Transition of Care  Outcome: Ongoing, Progressing   Goal Outcome Evaluation:  Plan of Care Reviewed With: patient        Progress: improving  Outcome Summary: elevated bp lisinopri started with  no ss of distress

## 2021-11-08 NOTE — PLAN OF CARE
Goal Outcome Evaluation:  Plan of Care Reviewed With: patient        Progress: no change  Outcome Summary: Pt UIC w/ c/o dizziness when sitting upright. Pt's BP seated in chair was 183/82. RN notified and came to room. Pt performed a few ther ex, but was asked by RN to discontinue 2/2 pt's BP. PT will prog as pt flaquita.    Patient was not wearing a face mask during this therapy encounter. Therapist used appropriate personal protective equipment including eye protection, mask, and gloves.  Mask used was standard procedure mask. Appropriate PPE was worn during the entire therapy session. Hand hygiene was completed before and after therapy session. Patient is not in enhanced droplet precautions.

## 2021-11-08 NOTE — PROGRESS NOTES
Pharmacy Consult: Warfarin Dosing/ Monitoring    Grace Renee is a 71 y.o. female, estimated creatinine clearance is 59.2 mL/min (A) (by C-G formula based on SCr of 1.31 mg/dL (H)). weighing (!) 142 kg (312 lb 6.4 oz).     has a past medical history of Diabetes mellitus (HCC), Elevated cholesterol, GERD (gastroesophageal reflux disease), Hypertension, Osteoarthritis of left hip (11/6/2021), and Sleep apnea.    Social History     Tobacco Use    Smoking status: Never Smoker    Smokeless tobacco: Never Used   Substance Use Topics    Alcohol use: Not Currently    Drug use: Never       Results from last 7 days   Lab Units 11/08/21  0542 11/07/21  1736 11/07/21  0649 11/06/21  2108 11/06/21  1128 11/06/21  0345 11/06/21  0339 11/05/21  1844 11/05/21  1650 11/05/21  0545 11/04/21  1513 11/04/21  0546 11/04/21  0546 11/03/21  0717 11/03/21  0522 11/02/21  2216 11/02/21  1541   INR  1.47*  --  1.23*  --   --  1.09  --   --   --  1.18* 1.12*  --   --   --   --   --  1.46*   APTT seconds 114.7* >200.0* 68.5* 91.2* 66.2* 146.7*  --  65.4*   < > 100.7*  --    < > 88.3*   < > >200.0*   < >  --    HEMOGLOBIN g/dL 9.1*  --  9.7*  --   --   --  9.5*  --   --  9.4*  --   --  9.8*  --  9.6*  --  10.4*   HEMATOCRIT % 27.9*  --  30.5*  --   --   --  29.2*  --   --  30.5*  --   --  30.9*  --  30.7*  --  32.4*   PLATELETS 10*3/mm3 254  --  199  --   --   --  204  --   --  209  --   --  205  --  205  --  216    < > = values in this interval not displayed.     Results from last 7 days   Lab Units 11/08/21  0542 11/07/21  0649 11/06/21  0345 11/05/21  0545 11/02/21  0541 11/01/21  1914 11/01/21  1914   SODIUM mmol/L 137 134* 133*   < > 141   < > 134*   POTASSIUM mmol/L 4.6 5.1 4.9   < > 4.1   < > 4.7   CHLORIDE mmol/L 101 98 98   < > 102   < > 97*   CO2 mmol/L 27.0 18.9* 23.9   < > 27.5   < > 22.1   BUN mg/dL 15 16 15   < > 27*   < > 31*   CREATININE mg/dL 1.31* 1.21* 1.18*   < > 1.38*   < > 1.61*   CALCIUM mg/dL 9.1 9.0 9.3   < > 9.6   <  > 9.4   BILIRUBIN mg/dL  --   --  0.3  --  0.3  --  0.4   ALK PHOS U/L  --   --  95  --  97  --  101   ALT (SGPT) U/L  --   --  24  --  33  --  37*   AST (SGOT) U/L  --   --  27  --  41*  --  57*   GLUCOSE mg/dL 67 138* 304*   < > 136*   < > 451*    < > = values in this interval not displayed.     Anticoagulation history: New start warfarin.     Hospital Anticoagulation:  Consulting provider: Sierra Brush MD  Start date: 11/04/21  Indication: Bilateral PE (s/p thrombectomy)  Target INR: 2-3  Bridge Therapy: Yes              and unfractionated heparin  Date 11/4 11/5 11/6 11/7 11/8        INR 1.12 1.18 1.09 1.23 1.47        Warfarin dose 5 mg 5 mg 7.5 mg 5mg 7.5mg          Potential drug interactions:   - Allopurinol: May enhance the anticoagulant effect of warfarin.   - Meloxicam: May enhance the anticoagulant effect of warfarin.     Relevant nutrition status: consistent carb    Education complete?/ Date: TBD    Assessment/Plan:    1) INR increasing. Will give dose of 7.5mg once again today based on our warfarin dosing protocol. Expect pt to need 39-42mg weekly outpatient. Would start with MonThurs, 5mg AOD.   2) INR ordered daily with AM labs while inpatient.  3) Recommend continuing bridge therapy with heparin IV infusion until INR within goal range for >24 hours.     Pharmacy will continue to follow until discharge or discontinuation of warfarin.     Briana Bernal RP  11/8/2021

## 2021-11-08 NOTE — DISCHARGE PLACEMENT REQUEST
"Son Renee (71 y.o. Female)             Date of Birth Social Security Number Address Home Phone MRN    1950  [address] [phone] [id number]    [employer] Status             Hindu        Admission Date Admission Type Admitting Provider Attending Provider Department, Room/Bed    11/1/21 Emergency Grayson Mcnulty MD Hussein, Samer H, MD [address], [address]    Discharge Date Discharge Disposition Discharge Destination                         Attending Provider: Laci Gordon MD    Allergies: No Known Allergies    Isolation: None   Infection: None   Code Status: CPR   Advance Care Planning Activity    Ht: 170.2 cm (67\")   Wt: 142 kg (312 lb 6.4 oz)    Admission Cmt: None   Principal Problem: Bilateral pulmonary embolism (HCC) [I26.99]                 Active Insurance as of 11/1/2021     Primary Coverage     Payor Plan Insurance Group Employer/Plan Group    MEDICARE MEDICARE A ONLY      Payor Plan Address Payor Plan Phone Number Payor Plan Fax Number Effective Dates    [address] [phone]  9/1/2011 - None Entered    [facility] [id number]       Subscriber Name Subscriber Birth Date Member ID       SON RENEE 1950 [id number]           Secondary Coverage     Payor Plan Insurance Group Employer/Plan Group    [id number]     Payor Plan Address Payor Plan Phone Number Payor Plan Fax Number Effective Dates    [address]   1/24/2006 - None Entered    [facility] [id number]       Subscriber Name Subscriber Birth Date Member ID       SON RENEE 1950 [id number]                 Emergency Contacts      (Rel.) Home Phone Work Phone Mobile Phone    Mary Ozuna (Sister) -- -- [phone]    Delphine Mcpherson (Sister) [phone] -- --              "

## 2021-11-08 NOTE — PLAN OF CARE
Goal Outcome Evaluation:  Plan of Care Reviewed With: patient        Progress: improving  Outcome Summary: Hep gtt continued, MD's want INR 2 and patient will be ready to D/C, assist x1, PRN meds for pain will CTM

## 2021-11-08 NOTE — PROGRESS NOTES
"CC: Submassive PE    Interval History: Patient reports nighttime cough particularly in the morning but no significant change in breathing.      Vital Signs  Temp:  [97.7 °F (36.5 °C)-98.8 °F (37.1 °C)] 98.5 °F (36.9 °C)  Heart Rate:  [82-85] 85  Resp:  [20] 20  BP: (142-162)/() 147/79    Intake/Output Summary (Last 24 hours) at 11/8/2021 1218  Last data filed at 11/8/2021 0818  Gross per 24 hour   Intake 720 ml   Output --   Net 720 ml     Flowsheet Rows      First Filed Value   Admission Height 170.2 cm (67\") Documented at 11/01/2021 2221   Admission Weight 127 kg (280 lb) Documented at 11/01/2021 2221          PHYSICAL EXAM:  General: No acute distress, morbid obese, supplemental oxygen through nasal cannula  Resp:NL Rate, symmetric chest expansion,unlabored, clear  CV:NL rate and rhythm, NL PMI, NL S1 and S2, no Murmur, no gallop, no rub, No JVD.   ABD:Nl sounds, no masses or tenderness, nondistended, no guarding or rebound  Neuro: alert,cooperative and oriented  Extr: Bilateral  trace leg edema    Results Review:    Results from last 7 days   Lab Units 11/08/21  0542   SODIUM mmol/L 137   POTASSIUM mmol/L 4.6   CHLORIDE mmol/L 101   CO2 mmol/L 27.0   BUN mg/dL 15   CREATININE mg/dL 1.31*   GLUCOSE mg/dL 67   CALCIUM mg/dL 9.1     Results from last 7 days   Lab Units 11/02/21  0541 11/01/21  2148 11/01/21  1914   TROPONIN T ng/mL 0.249* 0.185* 0.033*     Results from last 7 days   Lab Units 11/08/21  0542   WBC 10*3/mm3 13.33*   HEMOGLOBIN g/dL 9.1*   HEMATOCRIT % 27.9*   PLATELETS 10*3/mm3 254     Results from last 7 days   Lab Units 11/08/21  0542 11/07/21  1736 11/07/21  0649 11/06/21  1128 11/06/21  0345   INR  1.47*  --  1.23*  --  1.09   APTT seconds 114.7* >200.0* 68.5*   < > 146.7*    < > = values in this interval not displayed.         Results from last 7 days   Lab Units 11/05/21  0545   MAGNESIUM mg/dL 1.7         I reviewed the patient's new clinical results.  I personally viewed and interpreted " the patient's EKG/Telemetry data        Medication Review:   Meds reviewed    heparin (porcine), 18 Units/kg/hr, Last Rate: 9 Units/kg/hr (11/08/21 0502)  Pharmacy to dose warfarin,         Assessment/Plan    1.  Provoked bilateral submassive pulmonary emboli with right ventricular dysfunction on echocardiogram status post aspiration thrombectomy  She is currently on bridging heparin and getting Coumadin-INR today is 1.47  2.  Syncope/hypotension/hypoxemic respiratory failure from PE-improved   3.  History of hypertension-currently on amlodipine and blood pressure overall is fairly controlled  4.  Morbid obesity with type II DM, hyperlipidemia, CONNOR   5.  Type II DM  6.  CKD       Patient hemodynamically stable.  Blood pressure slightly on the higher side.  Can restart lisinopril as BMP can be monitored in the hospital or rehab discharge  Patient discharge pending INR to be therapeutic  I will sign off for now and will follow patient in clinic.      Dm Tabor MD  11/08/21  12:18 EST

## 2021-11-09 LAB
ANION GAP SERPL CALCULATED.3IONS-SCNC: 10.8 MMOL/L (ref 5–15)
APTT PPP: 74.5 SECONDS (ref 22.7–35.4)
APTT PPP: 84.8 SECONDS (ref 22.7–35.4)
BASOPHILS # BLD AUTO: 0.13 10*3/MM3 (ref 0–0.2)
BASOPHILS NFR BLD AUTO: 1.2 % (ref 0–1.5)
BUN SERPL-MCNC: 18 MG/DL (ref 8–23)
BUN/CREAT SERPL: 12.2 (ref 7–25)
CALCIUM SPEC-SCNC: 9.5 MG/DL (ref 8.6–10.5)
CHLORIDE SERPL-SCNC: 95 MMOL/L (ref 98–107)
CO2 SERPL-SCNC: 27.2 MMOL/L (ref 22–29)
CREAT SERPL-MCNC: 1.47 MG/DL (ref 0.57–1)
DEPRECATED RDW RBC AUTO: 39.2 FL (ref 37–54)
EOSINOPHIL # BLD AUTO: 0.61 10*3/MM3 (ref 0–0.4)
EOSINOPHIL NFR BLD AUTO: 5.6 % (ref 0.3–6.2)
ERYTHROCYTE [DISTWIDTH] IN BLOOD BY AUTOMATED COUNT: 12.9 % (ref 12.3–15.4)
GFR SERPL CREATININE-BSD FRML MDRD: 43 ML/MIN/1.73
GLUCOSE BLDC GLUCOMTR-MCNC: 118 MG/DL (ref 70–130)
GLUCOSE BLDC GLUCOMTR-MCNC: 164 MG/DL (ref 70–130)
GLUCOSE BLDC GLUCOMTR-MCNC: 183 MG/DL (ref 70–130)
GLUCOSE BLDC GLUCOMTR-MCNC: 222 MG/DL (ref 70–130)
GLUCOSE BLDC GLUCOMTR-MCNC: 285 MG/DL (ref 70–130)
GLUCOSE SERPL-MCNC: 154 MG/DL (ref 65–99)
HCT VFR BLD AUTO: 29.6 % (ref 34–46.6)
HGB BLD-MCNC: 9.7 G/DL (ref 12–15.9)
IMM GRANULOCYTES # BLD AUTO: 0.2 10*3/MM3 (ref 0–0.05)
IMM GRANULOCYTES NFR BLD AUTO: 1.8 % (ref 0–0.5)
INR PPP: 1.56 (ref 0.9–1.1)
LYMPHOCYTES # BLD AUTO: 3.08 10*3/MM3 (ref 0.7–3.1)
LYMPHOCYTES NFR BLD AUTO: 28.5 % (ref 19.6–45.3)
MCH RBC QN AUTO: 28.1 PG (ref 26.6–33)
MCHC RBC AUTO-ENTMCNC: 32.8 G/DL (ref 31.5–35.7)
MCV RBC AUTO: 85.8 FL (ref 79–97)
MONOCYTES # BLD AUTO: 1.12 10*3/MM3 (ref 0.1–0.9)
MONOCYTES NFR BLD AUTO: 10.4 % (ref 5–12)
NEUTROPHILS NFR BLD AUTO: 5.68 10*3/MM3 (ref 1.7–7)
NEUTROPHILS NFR BLD AUTO: 52.5 % (ref 42.7–76)
NRBC BLD AUTO-RTO: 0.4 /100 WBC (ref 0–0.2)
PLATELET # BLD AUTO: 263 10*3/MM3 (ref 140–450)
PMV BLD AUTO: 11.2 FL (ref 6–12)
POTASSIUM SERPL-SCNC: 5 MMOL/L (ref 3.5–5.2)
PROTHROMBIN TIME: 18.5 SECONDS (ref 11.7–14.2)
RBC # BLD AUTO: 3.45 10*6/MM3 (ref 3.77–5.28)
SODIUM SERPL-SCNC: 133 MMOL/L (ref 136–145)
WBC # BLD AUTO: 10.82 10*3/MM3 (ref 3.4–10.8)

## 2021-11-09 PROCEDURE — 63710000001 INSULIN LISPRO (HUMAN) PER 5 UNITS: Performed by: INTERNAL MEDICINE

## 2021-11-09 PROCEDURE — 63710000001 INSULIN ISOPHANE & REGULAR PER 5 UNITS: Performed by: INTERNAL MEDICINE

## 2021-11-09 PROCEDURE — 85025 COMPLETE CBC W/AUTO DIFF WBC: CPT | Performed by: INTERNAL MEDICINE

## 2021-11-09 PROCEDURE — 25010000002 HEPARIN (PORCINE) PER 1000 UNITS: Performed by: INTERNAL MEDICINE

## 2021-11-09 PROCEDURE — 94760 N-INVAS EAR/PLS OXIMETRY 1: CPT

## 2021-11-09 PROCEDURE — 85730 THROMBOPLASTIN TIME PARTIAL: CPT | Performed by: INTERNAL MEDICINE

## 2021-11-09 PROCEDURE — 82962 GLUCOSE BLOOD TEST: CPT

## 2021-11-09 PROCEDURE — 94618 PULMONARY STRESS TESTING: CPT

## 2021-11-09 PROCEDURE — 94799 UNLISTED PULMONARY SVC/PX: CPT

## 2021-11-09 PROCEDURE — 94761 N-INVAS EAR/PLS OXIMETRY MLT: CPT

## 2021-11-09 PROCEDURE — 80048 BASIC METABOLIC PNL TOTAL CA: CPT | Performed by: INTERNAL MEDICINE

## 2021-11-09 PROCEDURE — 85610 PROTHROMBIN TIME: CPT | Performed by: INTERNAL MEDICINE

## 2021-11-09 RX ORDER — WARFARIN SODIUM 7.5 MG/1
7.5 TABLET ORAL
Status: DISCONTINUED | OUTPATIENT
Start: 2021-11-09 | End: 2021-11-11 | Stop reason: HOSPADM

## 2021-11-09 RX ORDER — INSULIN LISPRO 100 [IU]/ML
5 INJECTION, SOLUTION INTRAVENOUS; SUBCUTANEOUS
Status: DISCONTINUED | OUTPATIENT
Start: 2021-11-09 | End: 2021-11-11 | Stop reason: HOSPADM

## 2021-11-09 RX ORDER — FUROSEMIDE 20 MG/1
20 TABLET ORAL DAILY
Status: DISCONTINUED | OUTPATIENT
Start: 2021-11-09 | End: 2021-11-11 | Stop reason: HOSPADM

## 2021-11-09 RX ADMIN — INSULIN LISPRO 3 UNITS: 100 INJECTION, SOLUTION INTRAVENOUS; SUBCUTANEOUS at 08:15

## 2021-11-09 RX ADMIN — PANTOPRAZOLE SODIUM 40 MG: 40 TABLET, DELAYED RELEASE ORAL at 06:55

## 2021-11-09 RX ADMIN — AMLODIPINE BESYLATE 10 MG: 10 TABLET ORAL at 20:28

## 2021-11-09 RX ADMIN — WARFARIN 7.5 MG: 7.5 TABLET ORAL at 17:28

## 2021-11-09 RX ADMIN — URSODIOL 300 MG: 300 CAPSULE ORAL at 20:28

## 2021-11-09 RX ADMIN — HYDROCODONE BITARTRATE AND ACETAMINOPHEN 1 TABLET: 5; 325 TABLET ORAL at 02:48

## 2021-11-09 RX ADMIN — GABAPENTIN 300 MG: 300 CAPSULE ORAL at 20:28

## 2021-11-09 RX ADMIN — SODIUM CHLORIDE, PRESERVATIVE FREE 10 ML: 5 INJECTION INTRAVENOUS at 08:07

## 2021-11-09 RX ADMIN — URSODIOL 300 MG: 300 CAPSULE ORAL at 08:03

## 2021-11-09 RX ADMIN — INSULIN LISPRO 5 UNITS: 100 INJECTION, SOLUTION INTRAVENOUS; SUBCUTANEOUS at 11:38

## 2021-11-09 RX ADMIN — INSULIN LISPRO 8 UNITS: 100 INJECTION, SOLUTION INTRAVENOUS; SUBCUTANEOUS at 11:37

## 2021-11-09 RX ADMIN — LISINOPRIL 10 MG: 10 TABLET ORAL at 08:03

## 2021-11-09 RX ADMIN — INSULIN HUMAN 50 UNITS: 100 INJECTION, SUSPENSION SUBCUTANEOUS at 08:02

## 2021-11-09 RX ADMIN — INSULIN LISPRO 5 UNITS: 100 INJECTION, SOLUTION INTRAVENOUS; SUBCUTANEOUS at 16:52

## 2021-11-09 RX ADMIN — PANTOPRAZOLE SODIUM 40 MG: 40 TABLET, DELAYED RELEASE ORAL at 16:53

## 2021-11-09 RX ADMIN — Medication 2000 UNITS: at 08:03

## 2021-11-09 RX ADMIN — INSULIN HUMAN 30 UNITS: 100 INJECTION, SUSPENSION SUBCUTANEOUS at 21:20

## 2021-11-09 RX ADMIN — SODIUM CHLORIDE, PRESERVATIVE FREE 10 ML: 5 INJECTION INTRAVENOUS at 20:28

## 2021-11-09 RX ADMIN — ALLOPURINOL 100 MG: 100 TABLET ORAL at 20:28

## 2021-11-09 RX ADMIN — GABAPENTIN 300 MG: 300 CAPSULE ORAL at 08:03

## 2021-11-09 RX ADMIN — HEPARIN SODIUM 8 UNITS/KG/HR: 10000 INJECTION, SOLUTION INTRAVENOUS at 20:29

## 2021-11-09 RX ADMIN — FUROSEMIDE 20 MG: 20 TABLET ORAL at 11:02

## 2021-11-09 NOTE — PLAN OF CARE
Goal Outcome Evaluation:  Plan of Care Reviewed With: patient        Progress: no change  Outcome Summary: Pt been sleeping most of the hs then suddenly woke up coughing and per pt feels like choking with the phlegm/secretions. Assisted pt on sitting position and suction with a yonker. Pt also c/o pain on R side yash when coughing. Prn pain meds given with relief noted.Continue on Heparin gtt at 8 units/kg/hr. Will continue to monitor.

## 2021-11-09 NOTE — CASE MANAGEMENT/SOCIAL WORK
Continued Stay Note  [facility]     Patient Name: Grace Renee  MRN: [id number]  Today's Date: 11/9/2021    Admit Date: 11/1/2021     Discharge Plan     Row Name 11/09/21 1005       Plan    Plan SNF in [place]    Patient/Family in Agreement with Plan other (see comments)    Plan Comments Spoke with MD anticipate dc later today if able. [facility] called [facility] [phone] and left message regarding referral status. Await call back. Partial Packet in ccp office. danielle ORTIZ/CCP               Discharge Codes    No documentation.               Expected Discharge Date and Time     Expected Discharge Date Expected Discharge Time    Nov 9, 2021             Alexandra Buchanan, RN

## 2021-11-09 NOTE — THERAPY EVALUATION
Exercise Oximetry    Patient Name:Grace Renee   MRN: [id number]   Date: 11/09/21             ROOM AIR BASELINE   SpO2%      87   Heart Rate    Blood Pressure      EXERCISE ON ROOM AIR SpO2% EXERCISE ON O2 @  LPM SpO2%   1 MINUTE  1 MINUTE    2 MINUTES  2 MINUTES    3 MINUTES  3 MINUTES    4 MINUTES  4 MINUTES    5 MINUTES  5 MINUTES    6 MINUTES  6 MINUTES               Distance Walked   Distance Walked   Dyspnea (Frank Scale)   Dyspnea (Frank Scale)   Fatigue (Frank Scale)   Fatigue (Frank Scale)   SpO2% Post Exercise   SpO2% Post Exercise   HR Post Exercise   HR Post Exercise   Time to Recovery  Time to Recovery     Comments:       Patient in bed at rest on room air desatted to 87% placed back on cannula at 1 liter and her sats came back up to 92%    Thank you   lis felton RRT

## 2021-11-09 NOTE — PROGRESS NOTES
Pharmacy Consult: Warfarin Dosing/ Monitoring    Grace Renee is a 71 y.o. female, estimated creatinine clearance is 59.2 mL/min (A) (by C-G formula based on SCr of 1.31 mg/dL (H)). weighing (!) 142 kg (312 lb 6.4 oz).      Results from last 7 days   Lab Units 11/09/21  0551 11/08/21  2104 11/08/21  1510 11/08/21  0542 11/07/21  1736 11/07/21  0649 11/06/21  2108 11/06/21  1128 11/06/21  0345 11/06/21  0339 11/05/21  1844 11/05/21  1650 11/05/21  0545 11/04/21  1513 11/04/21  0546 11/04/21  0546 11/03/21  0717 11/03/21  0522 11/02/21  2216 11/02/21  1541   INR  1.56*  --   --  1.47*  --  1.23*  --   --  1.09  --   --   --  1.18* 1.12*  --   --   --   --   --  1.46*   APTT seconds 74.5* 186.5* 70.1* 114.7* >200.0* 68.5* 91.2*   < > 146.7*  --    < >   < > 100.7*  --    < > 88.3*   < > >200.0*   < >  --    HEMOGLOBIN g/dL 9.7*  --   --  9.1*  --  9.7*  --   --   --  9.5*  --   --  9.4*  --   --  9.8*  --  9.6*  --  10.4*   HEMATOCRIT % 29.6*  --   --  27.9*  --  30.5*  --   --   --  29.2*  --   --  30.5*  --   --  30.9*  --  30.7*  --  32.4*   PLATELETS 10*3/mm3 263  --   --  254  --  199  --   --   --  204  --   --  209  --   --  205  --  205  --  216    < > = values in this interval not displayed.       Anticoagulation history: New start warfarin.     Hospital Anticoagulation:  Consulting provider: Sierra Brush MD  Start date: 11/04/21  Indication: Bilateral PE (s/p thrombectomy)  Target INR: 2-3  Bridge Therapy: Yes              and unfractionated heparin  Date 11/4 11/5 11/6 11/7 11/8 11/9       INR 1.12 1.18 1.09 1.23 1.47 1.56       Warfarin dose 5 mg 5 mg 7.5 mg 5mg 7.5mg 7.5mg         Potential drug interactions:   - Allopurinol: May enhance the anticoagulant effect of warfarin.   - Meloxicam: May enhance the anticoagulant effect of warfarin.     Relevant nutrition status: consistent carb    Education complete?/ Date: TBD    Assessment/Plan:    1) INR increasing. Will start warfarin 7.5mg daily  2) INR  ordered daily with AM labs while inpatient.  3) Recommend continuing bridge therapy with heparin IV infusion until INR within goal range for >24 hours.     Pharmacy will continue to follow until discharge or discontinuation of warfarin.     Nikhil OlmsteadD

## 2021-11-09 NOTE — PROGRESS NOTES
Name: Grace Renee ADMIT: 2021   : 1950  PCP: No primary care provider on file.    MRN: [id number] LOS: 7 days   AGE/SEX: 71 y.o. female  ROOM: [facility]     Subjective   Subjective   .  No abdominal pain.  No nausea or vomiting.  Normal bowel movements without constipation/diarrhea/bleeding per rectum/melena.  Resolved dyspnea.  No chest pain.  Positive positive periods of cough productive of clear sputum..  No fever or chills.  No hemoptysis.  No PND.  No palpitation.  Positive ankle edema.  No more loss of consciousness.  No focal neurological symptoms.  Feels much stronger.    Review of Systems    .  No dysuria or hematuria.       Objective   Objective   Vital Signs  Temp:  [97.6 °F (36.4 °C)-98.5 °F (36.9 °C)] 97.6 °F (36.4 °C)  Heart Rate:  [] 75  Resp:  [20-22] 20  BP: (113-183)/(52-93) 113/52  SpO2:  [91 %-99 %] 96 %  on  Flow (L/min):  [1-2] 1;   Device (Oxygen Therapy): nasal cannula    Intake/Output Summary (Last 24 hours) at 2021 0925  Last data filed at 2021 0805  Gross per 24 hour   Intake 720 ml   Output --   Net 720 ml     Body mass index is 48.93 kg/m².      21  0617 21  0639 21  0622   Weight: (!) 141 kg (310 lb 13.6 oz) (!) 142 kg (312 lb 6.4 oz) (patient felt light headed, asked to get weight later)     Physical Exam    General.  Middle-aged female.  Obese.  Alert and oriented x3.  No respiratory distress..  No pain.  No diaphoresis.   Eyes.  Pupils equal round and reactive.  Intact extraocular musculature.  No pallor or jaundice.  Normal conjunctive and lids.  Oral cavity.  Moist mucous membrane.   Neck.  Supple.  No JVD.  No lymphadenopathy or thyromegaly.  Cardiovascular.  Regular rate and rhythm.  No gallops or murmurs.  Chest.  Poor but clear to auscultation bilaterally with no added sounds.  Abdomen.  Obese.  Soft lax.  No tenderness.  No organomegaly.  No guarding or rebound.  Extremities.  +2 bilateral lower extremity edema.  No clubbing  or cyanosis.  CNS.  No acute focal neurological deficits.    Results Review:      Results from last 7 days   Lab Units 11/09/21  0551 11/08/21  0542 11/07/21  0649 11/06/21  0345 11/05/21  0545   SODIUM mmol/L 133* 137 134* 133* 133*   POTASSIUM mmol/L 5.0 4.6 5.1 4.9 4.6   CHLORIDE mmol/L 95* 101 98 98 99   CO2 mmol/L 27.2 27.0 18.9* 23.9 24.9   BUN mg/dL 18 15 16 15 15   CREATININE mg/dL 1.47* 1.31* 1.21* 1.18* 1.05*   GLUCOSE mg/dL 154* 67 138* 304* 309*   CALCIUM mg/dL 9.5 9.1 9.0 9.3 8.8   AST (SGOT) U/L  --   --   --  27  --    ALT (SGPT) U/L  --   --   --  24  --      Estimated Creatinine Clearance: 52.7 mL/min (A) (by C-G formula based on SCr of 1.47 mg/dL (H)).      Results from last 7 days   Lab Units 11/09/21  0602 11/09/21  0418 11/08/21  2118 11/08/21  1608 11/08/21  1131 11/08/21  0551 11/07/21  2045 11/07/21  1626   GLUCOSE mg/dL 164* 183* 381* 343* 268* 77 230* 185*                 Results from last 7 days   Lab Units 11/05/21  0545   MAGNESIUM mg/dL 1.7           Invalid input(s): LDLCALC  Results from last 7 days   Lab Units 11/09/21  0551 11/08/21  0542 11/08/21  0542 11/07/21  0649 11/07/21  0649 11/06/21  0339 11/06/21  0339 11/05/21  0545 11/05/21  0545 11/04/21  0546 11/04/21  0546 11/03/21  0522 11/03/21  0522   WBC 10*3/mm3 10.82*  --  13.33*  --  11.45*  --  11.43*  --  9.45  --  9.87  --  10.38   HEMOGLOBIN g/dL 9.7*  --  9.1*  --  9.7*  --  9.5*  --  9.4*  --  9.8*  --  9.6*   HEMATOCRIT % 29.6*  --  27.9*  --  30.5*  --  29.2*  --  30.5*  --  30.9*  --  30.7*   PLATELETS 10*3/mm3 263  --  254  --  199  --  204  --  209  --  205  --  205   MCV fL 85.8   < > 86.9   < > 87.4   < > 85.9   < > 88.9   < > 88.0   < > 89.5   MCH pg 28.1   < > 28.3   < > 27.8   < > 27.9   < > 27.4   < > 27.9   < > 28.0   MCHC g/dL 32.8   < > 32.6   < > 31.8   < > 32.5   < > 30.8*   < > 31.7   < > 31.3*   RDW % 12.9   < > 13.1   < > 12.9   < > 12.6   < > 12.6   < > 12.5   < > 12.7   RDW-SD fl 39.2   < > 40.6   < >  40.6   < > 38.1   < > 40.7   < > 39.7   < > 41.7   MPV fL 11.2   < > 10.7   < > 11.8   < > 11.1   < > 11.9   < > 10.7   < > 11.3   NEUTROPHIL % % 52.5   < > 47.3   < > 52.4   < > 52.8   < > 54.7   < > 53.0   < > 60.9   LYMPHOCYTE % % 28.5   < > 34.9   < > 30.7   < > 31.0   < > 27.8   < > 28.8   < > 21.9   MONOCYTES % % 10.4   < > 9.4   < > 8.6   < > 8.0   < > 9.2   < > 9.4   < > 9.4   EOSINOPHIL % % 5.6   < > 5.8   < > 5.9   < > 6.2   < > 6.5*   < > 7.3*   < > 6.2   BASOPHIL % % 1.2   < > 0.9   < > 1.2   < > 1.0   < > 1.0   < > 1.0   < > 1.2   IMM GRAN % % 1.8*   < > 1.7*   < > 1.2*   < > 1.0*   < > 0.8*   < > 0.5   < > 0.4   NEUTROS ABS 10*3/mm3 5.68   < > 6.31   < > 6.00   < > 6.03   < > 5.17   < > 5.23   < > 6.33   LYMPHS ABS 10*3/mm3 3.08   < > 4.65*   < > 3.51*   < > 3.54*   < > 2.63   < > 2.84   < > 2.27   MONOS ABS 10*3/mm3 1.12*   < > 1.25*   < > 0.99*   < > 0.92*   < > 0.87   < > 0.93*   < > 0.98*   EOS ABS 10*3/mm3 0.61*   < > 0.77*   < > 0.67*   < > 0.71*   < > 0.61*   < > 0.72*   < > 0.64*   BASOS ABS 10*3/mm3 0.13   < > 0.12   < > 0.14   < > 0.11   < > 0.09   < > 0.10   < > 0.12   IMMATURE GRANS (ABS) 10*3/mm3 0.20*   < > 0.23*   < > 0.14*   < > 0.12*   < > 0.08*   < > 0.05   < > 0.04   NRBC /100 WBC 0.4*   < > 0.4*   < > 0.2   < > 0.2   < > 0.1   < > 0.1   < > 0.0    < > = values in this interval not displayed.     Results from last 7 days   Lab Units 11/09/21  0551 11/08/21  2104 11/08/21  1510 11/08/21  0542 11/07/21  1736 11/07/21  0649 11/06/21  2108 11/06/21  1128 11/06/21  0345 11/05/21  1650 11/05/21  0545 11/04/21  1513 11/04/21  0546 11/02/21  2216 11/02/21  1541   INR  1.56*  --   --  1.47*  --  1.23*  --   --  1.09  --  1.18* 1.12*  --   --  1.46*   APTT seconds 74.5* 186.5* 70.1* 114.7* >200.0* 68.5* 91.2*   < > 146.7*   < > 100.7*  --    < >   < >  --     < > = values in this interval not displayed.                                       Imaging:  Imaging Results (Last 24 Hours)     ** No  results found for the last 24 hours. **             I reviewed the patient's new clinical results / labs / tests / procedures      Assessment/Plan     Active Hospital Problems    Diagnosis  POA   • **Bilateral pulmonary embolism (HCC) [I26.99]  Yes   • Osteoarthritis of left hip [M16.12]  Yes   • Anemia [D64.9]  No   • Acute respiratory failure with hypoxia (HCC) [J96.01]  Yes   • Left hip pain [M25.552]  Yes   • Carpal tunnel syndrome, bilateral [G56.03]  Yes   • Sleep apnea [G47.30]  Yes   • Hypertension [I10]  Yes   • GERD (gastroesophageal reflux disease) [K21.9]  Yes   • Elevated cholesterol [E78.00]  Yes   • Type 2 diabetes mellitus, with long-term current use of insulin (HCC) [E11.9, Z79.4]  Not Applicable   • DINAH (acute kidney injury) (HCC) [N17.9]  Yes   • Syncope [R55]  Yes      Resolved Hospital Problems   No resolved problems to display.     Labs are pending today.      Syncope secondary to bilateral pulmonary emboli with cor pulmonale and right ventricular stain leading to acute hypoxemic respiratory failure and hypotension.  Status post mechanical thrombectomy.  Currently on heparin being transitioned to Coumadin.  Pharmacy adjusting Coumadin dose and following INR.  Cardiology saw patient and believes this is an acute on top of chronic blood moderate emboli as there has not been any significant change in the pulmonary pressures.  Lower extremity venous ultrasound is negative.  Pulmonary also on board.  Both consults appreciated.  Continue to try to wean off oxygen.  Will check walking pulse oximetry to see if the patient needs oxygen after discharge.    Acute kidney failure.  No baseline creatinine available.  Most likely secondary to prerenal azotemia because of hypotension at admission and the patient was taking ACE inhibitors.  Kidney function initially improved but worsened again after initiation of lisinopril.   lisinopril and hydrochlorothiazide DC'd and Norvasc increased.  Patient appears to be  volume overloaded today.  Will initiate Lasix.  Will monitor renal function.    Patient might have a stage III renal failure before admission.  History of hypertension and elevated troponin.  Chest pain is atypical has since resolved..  Troponin elevation mostly secondary to PE.  Good blood pressure control with no evidence of angina or congestive heart failure on at this time DC'd hydrochlorothiazide and lisinopril secondary to relapse of the acute kidney failure.  Good blood pressure control after increasing Norvasc.    Type 2 diabetes.  A1c 9.2.  Much improved Accu-Chek after switching the patient back to her original dose of NPH.  Positive postprandial hyperglycemia.  Will add AC lispro   obstructive sleep apnea.  Sleep study as outpatient per pulmonary  Chronic disease anemia.  Hemoglobin is stable.  Continue to monitor.    Motility disorder.  On PT.  PT recommends skilled facility.  GERD.  Resolved with increasing Protonix and on as needed Tums.  Left hip osteoarthritis.  Continue Tylenol and physical therapy.      Discussed with patient/nurse.  Disposition.  Will need skilled facility placement.  Discharge planner is working on that.      Laci Gordon MD  [facility]  11/09/21  09:25 EST

## 2021-11-10 PROBLEM — G56.03 CARPAL TUNNEL SYNDROME, BILATERAL: Status: RESOLVED | Noted: 2021-11-02 | Resolved: 2021-11-10

## 2021-11-10 PROBLEM — M25.552 LEFT HIP PAIN: Status: RESOLVED | Noted: 2021-11-02 | Resolved: 2021-11-10

## 2021-11-10 PROBLEM — R55 SYNCOPE: Status: RESOLVED | Noted: 2021-11-01 | Resolved: 2021-11-10

## 2021-11-10 LAB
ANION GAP SERPL CALCULATED.3IONS-SCNC: 10.4 MMOL/L (ref 5–15)
APTT PPP: 70.6 SECONDS (ref 22.7–35.4)
BASOPHILS # BLD AUTO: 0.1 10*3/MM3 (ref 0–0.2)
BASOPHILS NFR BLD AUTO: 1 % (ref 0–1.5)
BUN SERPL-MCNC: 19 MG/DL (ref 8–23)
BUN/CREAT SERPL: 14.8 (ref 7–25)
CALCIUM SPEC-SCNC: 9.3 MG/DL (ref 8.6–10.5)
CHLORIDE SERPL-SCNC: 100 MMOL/L (ref 98–107)
CO2 SERPL-SCNC: 25.6 MMOL/L (ref 22–29)
CREAT SERPL-MCNC: 1.28 MG/DL (ref 0.57–1)
DEPRECATED RDW RBC AUTO: 41.4 FL (ref 37–54)
EOSINOPHIL # BLD AUTO: 0.56 10*3/MM3 (ref 0–0.4)
EOSINOPHIL NFR BLD AUTO: 5.6 % (ref 0.3–6.2)
ERYTHROCYTE [DISTWIDTH] IN BLOOD BY AUTOMATED COUNT: 13.1 % (ref 12.3–15.4)
GFR SERPL CREATININE-BSD FRML MDRD: 50 ML/MIN/1.73
GLUCOSE BLDC GLUCOMTR-MCNC: 171 MG/DL (ref 70–130)
GLUCOSE BLDC GLUCOMTR-MCNC: 192 MG/DL (ref 70–130)
GLUCOSE BLDC GLUCOMTR-MCNC: 221 MG/DL (ref 70–130)
GLUCOSE BLDC GLUCOMTR-MCNC: 238 MG/DL (ref 70–130)
GLUCOSE SERPL-MCNC: 197 MG/DL (ref 65–99)
HCT VFR BLD AUTO: 29.5 % (ref 34–46.6)
HGB BLD-MCNC: 9.7 G/DL (ref 12–15.9)
IMM GRANULOCYTES # BLD AUTO: 0.13 10*3/MM3 (ref 0–0.05)
IMM GRANULOCYTES NFR BLD AUTO: 1.3 % (ref 0–0.5)
INR PPP: 1.82 (ref 0.9–1.1)
LYMPHOCYTES # BLD AUTO: 3.24 10*3/MM3 (ref 0.7–3.1)
LYMPHOCYTES NFR BLD AUTO: 32.3 % (ref 19.6–45.3)
MCH RBC QN AUTO: 28.6 PG (ref 26.6–33)
MCHC RBC AUTO-ENTMCNC: 32.9 G/DL (ref 31.5–35.7)
MCV RBC AUTO: 87 FL (ref 79–97)
MONOCYTES # BLD AUTO: 1.04 10*3/MM3 (ref 0.1–0.9)
MONOCYTES NFR BLD AUTO: 10.4 % (ref 5–12)
NEUTROPHILS NFR BLD AUTO: 4.97 10*3/MM3 (ref 1.7–7)
NEUTROPHILS NFR BLD AUTO: 49.4 % (ref 42.7–76)
NRBC BLD AUTO-RTO: 0.2 /100 WBC (ref 0–0.2)
PLATELET # BLD AUTO: 258 10*3/MM3 (ref 140–450)
PMV BLD AUTO: 11.1 FL (ref 6–12)
POTASSIUM SERPL-SCNC: 4.7 MMOL/L (ref 3.5–5.2)
PROTHROMBIN TIME: 20.8 SECONDS (ref 11.7–14.2)
RBC # BLD AUTO: 3.39 10*6/MM3 (ref 3.77–5.28)
SODIUM SERPL-SCNC: 136 MMOL/L (ref 136–145)
WBC # BLD AUTO: 10.04 10*3/MM3 (ref 3.4–10.8)

## 2021-11-10 PROCEDURE — 85730 THROMBOPLASTIN TIME PARTIAL: CPT | Performed by: INTERNAL MEDICINE

## 2021-11-10 PROCEDURE — 94799 UNLISTED PULMONARY SVC/PX: CPT

## 2021-11-10 PROCEDURE — 85025 COMPLETE CBC W/AUTO DIFF WBC: CPT | Performed by: INTERNAL MEDICINE

## 2021-11-10 PROCEDURE — 97165 OT EVAL LOW COMPLEX 30 MIN: CPT | Performed by: OCCUPATIONAL THERAPIST

## 2021-11-10 PROCEDURE — 63710000001 INSULIN ISOPHANE & REGULAR PER 5 UNITS: Performed by: INTERNAL MEDICINE

## 2021-11-10 PROCEDURE — 25010000002 HEPARIN (PORCINE) PER 1000 UNITS: Performed by: INTERNAL MEDICINE

## 2021-11-10 PROCEDURE — 85610 PROTHROMBIN TIME: CPT | Performed by: INTERNAL MEDICINE

## 2021-11-10 PROCEDURE — 63710000001 INSULIN LISPRO (HUMAN) PER 5 UNITS: Performed by: INTERNAL MEDICINE

## 2021-11-10 PROCEDURE — 80048 BASIC METABOLIC PNL TOTAL CA: CPT | Performed by: INTERNAL MEDICINE

## 2021-11-10 PROCEDURE — 82962 GLUCOSE BLOOD TEST: CPT

## 2021-11-10 PROCEDURE — 97110 THERAPEUTIC EXERCISES: CPT | Performed by: OCCUPATIONAL THERAPIST

## 2021-11-10 PROCEDURE — 97530 THERAPEUTIC ACTIVITIES: CPT

## 2021-11-10 RX ORDER — ONDANSETRON 4 MG/1
4 TABLET, FILM COATED ORAL EVERY 6 HOURS PRN
Qty: 90 TABLET | Refills: 0 | Status: SHIPPED | OUTPATIENT
Start: 2021-11-10 | End: 2022-02-02

## 2021-11-10 RX ORDER — INSULIN LISPRO 100 [IU]/ML
0-14 INJECTION, SOLUTION INTRAVENOUS; SUBCUTANEOUS
Qty: 60 ML | Refills: 3 | Status: SHIPPED | OUTPATIENT
Start: 2021-11-10 | End: 2022-02-02

## 2021-11-10 RX ORDER — WARFARIN SODIUM 7.5 MG/1
TABLET ORAL
Qty: 30 TABLET | Refills: 3 | Status: SHIPPED | OUTPATIENT
Start: 2021-11-10

## 2021-11-10 RX ORDER — LISINOPRIL 10 MG/1
10 TABLET ORAL
Qty: 30 TABLET | Refills: 3 | Status: SHIPPED | OUTPATIENT
Start: 2021-11-11 | End: 2022-02-02 | Stop reason: SDUPTHER

## 2021-11-10 RX ORDER — GABAPENTIN 300 MG/1
300 CAPSULE ORAL 2 TIMES DAILY
Qty: 12 CAPSULE | Refills: 0 | Status: SHIPPED | OUTPATIENT
Start: 2021-11-10

## 2021-11-10 RX ORDER — INSULIN LISPRO 100 [IU]/ML
5 INJECTION, SOLUTION INTRAVENOUS; SUBCUTANEOUS
Qty: 60 ML | Refills: 3 | Status: SHIPPED | OUTPATIENT
Start: 2021-11-10 | End: 2022-02-02

## 2021-11-10 RX ORDER — FUROSEMIDE 20 MG/1
20 TABLET ORAL DAILY
Qty: 30 TABLET | Refills: 3 | Status: SHIPPED | OUTPATIENT
Start: 2021-11-11

## 2021-11-10 RX ORDER — PANTOPRAZOLE SODIUM 40 MG/1
40 TABLET, DELAYED RELEASE ORAL
Qty: 60 TABLET | Refills: 3 | Status: SHIPPED | OUTPATIENT
Start: 2021-11-10 | End: 2022-02-02 | Stop reason: ALTCHOICE

## 2021-11-10 RX ORDER — CALCIUM CARBONATE 200(500)MG
1 TABLET,CHEWABLE ORAL 3 TIMES DAILY PRN
Qty: 90 TABLET | Refills: 3 | Status: SHIPPED | OUTPATIENT
Start: 2021-11-10

## 2021-11-10 RX ADMIN — HEPARIN SODIUM 8 UNITS/KG/HR: 10000 INJECTION, SOLUTION INTRAVENOUS at 20:48

## 2021-11-10 RX ADMIN — GABAPENTIN 300 MG: 300 CAPSULE ORAL at 08:32

## 2021-11-10 RX ADMIN — INSULIN LISPRO 5 UNITS: 100 INJECTION, SOLUTION INTRAVENOUS; SUBCUTANEOUS at 16:58

## 2021-11-10 RX ADMIN — URSODIOL 300 MG: 300 CAPSULE ORAL at 20:50

## 2021-11-10 RX ADMIN — INSULIN LISPRO 3 UNITS: 100 INJECTION, SOLUTION INTRAVENOUS; SUBCUTANEOUS at 11:35

## 2021-11-10 RX ADMIN — LISINOPRIL 10 MG: 10 TABLET ORAL at 08:32

## 2021-11-10 RX ADMIN — INSULIN LISPRO 5 UNITS: 100 INJECTION, SOLUTION INTRAVENOUS; SUBCUTANEOUS at 08:32

## 2021-11-10 RX ADMIN — INSULIN HUMAN 30 UNITS: 100 INJECTION, SUSPENSION SUBCUTANEOUS at 21:39

## 2021-11-10 RX ADMIN — GABAPENTIN 300 MG: 300 CAPSULE ORAL at 20:50

## 2021-11-10 RX ADMIN — INSULIN HUMAN 50 UNITS: 100 INJECTION, SUSPENSION SUBCUTANEOUS at 08:32

## 2021-11-10 RX ADMIN — WARFARIN 7.5 MG: 7.5 TABLET ORAL at 16:58

## 2021-11-10 RX ADMIN — URSODIOL 300 MG: 300 CAPSULE ORAL at 08:32

## 2021-11-10 RX ADMIN — PANTOPRAZOLE SODIUM 40 MG: 40 TABLET, DELAYED RELEASE ORAL at 06:46

## 2021-11-10 RX ADMIN — PANTOPRAZOLE SODIUM 40 MG: 40 TABLET, DELAYED RELEASE ORAL at 16:58

## 2021-11-10 RX ADMIN — ALLOPURINOL 100 MG: 100 TABLET ORAL at 20:50

## 2021-11-10 RX ADMIN — Medication 2000 UNITS: at 08:32

## 2021-11-10 RX ADMIN — INSULIN LISPRO 5 UNITS: 100 INJECTION, SOLUTION INTRAVENOUS; SUBCUTANEOUS at 11:36

## 2021-11-10 RX ADMIN — FUROSEMIDE 20 MG: 20 TABLET ORAL at 08:32

## 2021-11-10 RX ADMIN — SODIUM CHLORIDE, PRESERVATIVE FREE 10 ML: 5 INJECTION INTRAVENOUS at 20:50

## 2021-11-10 RX ADMIN — INSULIN LISPRO 5 UNITS: 100 INJECTION, SOLUTION INTRAVENOUS; SUBCUTANEOUS at 06:46

## 2021-11-10 RX ADMIN — AMLODIPINE BESYLATE 10 MG: 10 TABLET ORAL at 20:50

## 2021-11-10 NOTE — PLAN OF CARE
Goal Outcome Evaluation:  Plan of Care Reviewed With: patient        Progress: improving  Outcome Summary: Pt making small improvements w/ total gait of 98' (90', 8'). Pt still exhibiting slow, antalgic, mildly unsteady gait and req CGA w/ use of no AD. Pt refusing use of fww when recommended. Pt req 3 min rest after she returned to room w/ SOA noted. PT will prog as pt flaquita.    Patient was intermittently wearing a face mask during this therapy encounter. Therapist used appropriate personal protective equipment including eye protection, mask, and gloves.  Mask used was standard procedure mask. Appropriate PPE was worn during the entire therapy session. Hand hygiene was completed before and after therapy session. Patient is not in enhanced droplet precautions.

## 2021-11-10 NOTE — CASE MANAGEMENT/SOCIAL WORK
Continued Stay Note  [facility]     Patient Name: Grace Renee  MRN: [id number]  Today's Date: 11/10/2021    Admit Date: 11/1/2021     Discharge Plan     Row Name 11/10/21 1435       Plan    Plan [facility]    Patient/Family in Agreement with Plan yes    Plan Comments Spoke with [facility], pt will need to pay out of pocket cost for MD at their facility as her insurance does not cover his service (about $500.00/weekly). CCP spoke with pt and she declines. She spoke with [facility] and agreeable for their rehab. Plan for dc via WC transport or EMS use tomorrow as pt needs oxygen. Updated MD. DC tomorrow in am. Juan ORTIZ/ABI    Row Name 11/10/21 0628       Plan    Plan Rehab    Patient/Family in Agreement with Plan yes    Plan Comments [facility], pt accepted but no beds until Saturday. CCP called [facility] and left vm regarding bed availability. CCP spoke with pt and updated her on [facility] bed availability and awaiting call back from [facility]. Asked permission for Marisol Coates to send her the link of [facility] virtual tour. Permission granted for him to contact her at her cell #[phone]. So that she can have further info regarding her decision. Juan ORTIZ/ABI               Discharge Codes    No documentation.               Expected Discharge Date and Time     Expected Discharge Date Expected Discharge Time    Nov 11, 2021             Alexandra Buchanan RN

## 2021-11-10 NOTE — THERAPY TREATMENT NOTE
Patient Name: Grace Renee  : 1950    MRN: [id number]                              Today's Date: 11/10/2021       Admit Date: 2021    Visit Dx:     ICD-10-CM ICD-9-CM   1. Syncope, unspecified syncope type  R55 780.2   2. Elevated troponin  R77.8 790.6   3. Bilateral pulmonary embolism (HCC)  I26.99 415.19     Patient Active Problem List   Diagnosis   • Syncope   • Left hip pain   • Carpal tunnel syndrome, bilateral   • Bilateral pulmonary embolism (HCC)   • Sleep apnea   • Hypertension   • GERD (gastroesophageal reflux disease)   • Elevated cholesterol   • Type 2 diabetes mellitus, with long-term current use of insulin (HCC)   • DINAH (acute kidney injury) (HCC)   • Acute respiratory failure with hypoxia (HCC)   • Anemia   • Osteoarthritis of left hip     Past Medical History:   Diagnosis Date   • Diabetes mellitus (HCC)    • Elevated cholesterol    • GERD (gastroesophageal reflux disease)    • Hypertension    • Osteoarthritis of left hip 2021   • Sleep apnea      Past Surgical History:   Procedure Laterality Date   • CARDIAC CATHETERIZATION Bilateral 2021    Procedure: Percutaneous Mechanical Thrombectomy- INARI;  Surgeon: Chris Pritchett MD;  Location: [facility] CATH INVASIVE LOCATION;  Service: Cardiovascular;  Laterality: Bilateral;   • CARDIAC CATHETERIZATION N/A 2021    Procedure: Right Heart Cath;  Surgeon: Chris Pritchett MD;  Location:  [facility] CATH INVASIVE LOCATION;  Service: Cardiovascular;  Laterality: N/A;   • INTERVENTIONAL RADIOLOGY PROCEDURE Bilateral 2021    Procedure: Pulmonary Angiogram;  Surgeon: Chris Pritchett MD;  Location:  [facility] CATH INVASIVE LOCATION;  Service: Cardiovascular;  Laterality: Bilateral;      General Information     Row Name 11/10/21 1158          Physical Therapy Time and Intention    Document Type therapy note (daily note)  -PH     Mode of Treatment physical therapy  -PH     Row Name 11/10/21 1158          General Information    Existing  Precautions/Restrictions no known precautions/restrictions  -PH     Row Name 11/10/21 1158          Safety Issues, Functional Mobility    Impairments Affecting Function (Mobility) endurance/activity tolerance; shortness of breath; balance; strength; pain  -PH           User Key  (r) = Recorded By, (t) = Taken By, (c) = Cosigned By    Initials Name Provider Type    PH Renee Dutta PTA Physical Therapy Assistant               Mobility     Row Name 11/10/21 1159          Bed Mobility    Comment (Bed Mobility) UIC  -PH     Row Name 11/10/21 1159          Transfers    Comment (Transfers) STS x 2; from chair/from BSC  -PH     Row Name 11/10/21 1159          Sit-Stand Transfer    Sit-Stand [place] (Transfers) contact guard; 1 person assist  -PH     Assistive Device (Sit-Stand Transfers) other (see comments)  No AD - push off from chair and BSC w/ B hands  -PH     Row Name 11/10/21 1159          Gait/Stairs (Locomotion)    [place] Level (Gait) contact guard; verbal cues  -PH     Assistive Device (Gait) other (see comments)  No AD  -PH     Distance in Feet (Gait) 8' to BSC then 90'  -PH     Deviations/Abnormal Patterns (Gait) neeraj decreased; gait speed decreased; stride length decreased; antalgic  -PH     [place] Level (Stairs) unable to assess  -PH     Comment (Gait/Stairs) mild unsteadiness and SOA noted w/ no LOB; pt req approx 3 min rest in chair prior to beg ther ex  -PH           User Key  (r) = Recorded By, (t) = Taken By, (c) = Cosigned By    Initials Name Provider Type     Renee Dutta PTA Physical Therapy Assistant               Obj/Interventions     Row Name 11/10/21 1205          Motor Skills    Therapeutic Exercise other (see comments)  BAP, LAQ, seated march; x 15 reps all; vc to decr speed  -PH     Row Name 11/10/21 1205          Balance    Balance Assessment standing static balance  -PH     Static Standing Balance WFL; supported; standing  -PH     Comment, Balance pt  CGA for stand bal w/ no LOB  -PH           User Key  (r) = Recorded By, (t) = Taken By, (c) = Cosigned By    Initials Name Provider Type    Renee Cruz PTA Physical Therapy Assistant               Goals/Plan    No documentation.                Clinical Impression     Row Name 11/10/21 1207          Pain    Additional Documentation Pain Scale: FACES Pre/Post-Treatment (Group)  -PH     Row Name 11/10/21 1207          Pain Scale: FACES Pre/Post-Treatment    Pain: FACES Scale, Pretreatment 2-->hurts little bit  -PH     Posttreatment Pain Rating 4-->hurts little more  -PH     Pain Location hip  -PH     Row Name 11/10/21 1207          Plan of Care Review    Plan of Care Reviewed With patient  -PH     Progress improving  -PH     Outcome Summary Pt making small improvements w/ total gait of 98' (90', 8'). Pt still exhibiting slow, antalgic, mildly unsteady gait and req CGA w/ use of no AD. Pt refusing use of fww when recommended. Pt req 3 min rest after she returned to room w/ SOA noted. PT will prog as pt flaquita.  -PH     Row Name 11/10/21 1207          Vital Signs    O2 Delivery Pre Treatment supplemental O2  -PH     O2 Delivery Intra Treatment room air  -PH     O2 Delivery Post Treatment supplemental O2  -PH     Row Name 11/10/21 1207          Positioning and Restraints    Pre-Treatment Position sitting in chair/recliner  -PH     Post Treatment Position chair  -PH     Bathroom sitting; call light within reach; encouraged to call for assist; exit alarm on; with family/caregiver  -PH           User Key  (r) = Recorded By, (t) = Taken By, (c) = Cosigned By    Initials Name Provider Type    Renee Cruz PTA Physical Therapy Assistant               Outcome Measures     Row Name 11/10/21 1210          How much help from another person do you currently need...    Turning from your back to your side while in flat bed without using bedrails? 4  -PH     Moving from lying on back to sitting on the side of  a flat bed without bedrails? 3  -PH     Moving to and from a bed to a chair (including a wheelchair)? 3  -PH     Standing up from a chair using your arms (e.g., wheelchair, bedside chair)? 3  -PH     Climbing 3-5 steps with a railing? 2  -PH     To walk in hospital room? 3  -PH     AM-PAC 6 Clicks Score (PT) 18  -PH     Row Name 11/10/21 1210          Functional Assessment    Outcome Measure Options AM-PAC 6 Clicks Basic Mobility (PT)  -PH           User Key  (r) = Recorded By, (t) = Taken By, (c) = Cosigned By    Initials Name Provider Type    PH Renee Dutta, LILLIANA Physical Therapy Assistant                             Physical Therapy Education                 Title: PT OT SLP Therapies (Done)     Topic: Physical Therapy (Done)     Point: Mobility training (Done)     Learning Progress Summary           Patient Acceptance, E,D, VU,NR by  at 11/10/2021 1210    Acceptance, E,TB,D, VU,DU,NR by  at 11/7/2021 2039    Acceptance, E,D, VU by  at 11/5/2021 1225    Acceptance, E, VU by  at 11/4/2021 1357                   Point: Home exercise program (Done)     Learning Progress Summary           Patient Acceptance, E,D, VU,NR by  at 11/10/2021 1210    Acceptance, E, VU,DU,NR by  at 11/8/2021 1558    Acceptance, E,TB,D, VU,DU,NR by  at 11/7/2021 2039    Acceptance, E, VU by  at 11/4/2021 1357                   Point: Body mechanics (Done)     Learning Progress Summary           Patient Acceptance, E,D, VU,NR by  at 11/10/2021 1210    Acceptance, E,TB,D, VU,DU,NR by  at 11/7/2021 2039    Acceptance, E,D, VU by  at 11/5/2021 1225    Acceptance, E, VU by  at 11/4/2021 1357                   Point: Precautions (Done)     Learning Progress Summary           Patient Acceptance, E,D, VU,NR by  at 11/10/2021 1210    Acceptance, E,TB,D, VU,DU,NR by  at 11/7/2021 2039    Acceptance, E,D, VU by PH at 11/5/2021 1225    Acceptance, E, VU by CF at 11/4/2021 1357                               User Key      Initials Effective Dates Name Provider Type Discipline     06/16/21 -  Yessenia Lares, RN Registered Nurse Nurse     06/16/21 -  Renee Dutta PTA Physical Therapy Assistant PT     06/16/21 -  Leslie Rogers PT Physical Therapist PT              PT Recommendation and Plan     Plan of Care Reviewed With: patient  Progress: improving  Outcome Summary: Pt making small improvements w/ total gait of 98' (90', 8'). Pt still exhibiting slow, antalgic, mildly unsteady gait and req CGA w/ use of no AD. Pt refusing use of fww when recommended. Pt req 3 min rest after she returned to room w/ SOA noted. PT will prog as pt flaquita.     Time Calculation:    PT Charges     Row Name 11/10/21 1211             Time Calculation    Start Time 1018  -PH      Stop Time 1037  -PH      Time Calculation (min) 19 min  -PH      PT Received On 11/10/21  -PH      PT - Next Appointment 11/11/21  -PH              Timed Charges    91226 - PT Therapeutic Exercise Minutes 3  -PH      30160 - PT Therapeutic Activity Minutes 16  -PH              Total Minutes    Timed Charges Total Minutes 19  -PH       Total Minutes 19  -PH            User Key  (r) = Recorded By, (t) = Taken By, (c) = Cosigned By    Initials Name Provider Type    PH Renee Dutta PTA Physical Therapy Assistant              Therapy Charges for Today     Code Description Service Date Service Provider Modifiers Qty    [id number]  PT THERAPEUTIC ACT EA 15 MIN 11/10/2021 Renee Dutta PTA GP 1          PT G-Codes  Outcome Measure Options: AM-PAC 6 Clicks Basic Mobility (PT)  AM-PAC 6 Clicks Score (PT): 18    Renee Dutta PTA  11/10/2021

## 2021-11-10 NOTE — PLAN OF CARE
Goal Outcome Evaluation:  Plan of Care Reviewed With: patient        Progress: improving  Outcome Summary: pt admitted on 11/1/21 w. B pulmonary embolism. pt was independent PTA and her niece lives with her, she had A from her getting into/out of her tub for safety, and is wanting to get grab bars but would love to turn it into a walk in one day so it will be safer. pt has a reacher at home. pt has difficulty with socks but did prior as well. ed on trying a sock aide to A w socks and reacher for pants and taking socks off. Pt ed on UE ex to incr strength/endurnace, pt complete AROM 10x2 B UE . pt stood w CGA/SBA. Able to test her strength when she was standing which demo pretty good balance, i would recommend acute rehab or home w HH and A from family. Cont therapy to incr ADL, strength, balance, tsf, and AE training.  OT wore all PPE, washed hands before/after

## 2021-11-10 NOTE — DISCHARGE SUMMARY
Patient Name: Grace Renee  : 1950  MRN: [id number]    Date of Admission: 2021  Date of Discharge:  11/10/2021  Primary Care Physician: No primary care provider on file.      Discharge Diagnoses     Active Hospital Problems    Diagnosis  POA   • **Bilateral pulmonary embolism (HCC) [I26.99]  Yes   • Osteoarthritis of left hip [M16.12]  Yes   • Anemia [D64.9]  No   • Acute respiratory failure with hypoxia (HCC) [J96.01]  Yes   • Sleep apnea [G47.30]  Yes   • Hypertension [I10]  Yes   • GERD (gastroesophageal reflux disease) [K21.9]  Yes   • Elevated cholesterol [E78.00]  Yes   • Type 2 diabetes mellitus, with long-term current use of insulin (HCC) [E11.9, Z79.4]  Not Applicable   • DINAH (acute kidney injury) (HCC) [N17.9]  Yes      Resolved Hospital Problems    Diagnosis Date Resolved POA   • Left hip pain [M25.552] 11/10/2021 Yes   • Carpal tunnel syndrome, bilateral [G56.03] 11/10/2021 Yes   • Syncope [R55] 11/10/2021 Yes        Hospital Course     Brief admission history and physical.  Please refer to the H&P for full details.  A pleasant 71 years old -American female with a past history of obstructive sleep apnea/hypertension/hyperlipidemia/GERD/carpal tunnel syndrome/type 2 diabetes.  She presented to the hospital with syncope and dyspnea associated with lower extremity edema.  She did describe dizziness.  There is no other symptomatology.  Physical exam remarkable for a temperature 97.4 a pulse of 84 respiratory of 16 and blood pressure 92/50 and O2 sats of 95% on 6 L.  The rest of the examination is remarkable for acutely ill female/obesity/tachycardia/mild respiratory distress/decreased left hip range of movement because of pain/bilateral lower extremity edema/anxiety  Hospital course.  Initial ER evaluation included a CMP that was normal except a glucose of 451/BUN 31/creatinine 1.6/sodium 134/97/ALT of 37/AST 57/GFR of 38.  Troponin elevated at 0.033.  proBNP was normal.  CBC was  normal except a white count of 12.92.  Respiratory PCR panel including Covid was negative.  A1c was 9.27.  D-dimer was positive.  ED of the chest revealed bilateral pulmonary thromboemboli with a RV to LV ratio of 1.6.  CT scanning of the brain revealed no acute intracranial disease.  Chest x-ray was of poor quality secondary to the patient habitus but no evidence of infiltrate or effusion.  EKG revealed sinus tachycardia with low voltage in the precordial leads nonspecific T wave abnormalities in the anterior leads and inferior Q waves.  The impression on this lady was that of syncope secondary to bilateral pulmonary emboli associated with cor pulmonale and right ventricular strain leading to acute hypoxemic respiratory failure and hypotension.  Cardiology was consulted .  Patient was placed on IV heparin.  Secondary to the right ventricular strain cardiology recommended mechanical thrombectomy and the patient has undergone that.  The cardiologist seem to think that this is an acute on top of chronic pulmonary emboli as the patient has no significant change in the pulmonary pressure after that her lower extremity venous ultrasound was negative for DVT.  Pulmonary also was consulted.  Patient gradually improved.  Her FiO2 demands has decreased.  However we were unable to wean her off oxygen and the patient is being discharged on oxygen.  She remained on heparin throughout the hospital stay.  She could not afford the DOAC and the Coumadin was started.  The target INR is between 2-3.  She is being discharged on Coumadin after her INR is almost therapeutic at 1.8.  She developed an acute kidney failure during her hospitalization which was thought to be secondary to the hypotension leading to prerenal azotemia in a patient who was taking ACE inhibitors.  Her lisinopril hydrochlorothiazide to/and her kidney failure has improved.  And this needs to be monitored as an outpatient.  At the time of discharge she was resumed  back on the lower dose of lisinopril and also on Lasix.  She has a history of hypertension.  She had atypical chest pain because of the pulmonary emboli that has resolved.  There was no evidence of clinical angina and congestive heart failure.  Blood pressure started to go up and she was started back on a lower dose of lisinopril.  Lasix was added.  We continued her Norvasc.  Blood pressure was acceptable at the time of discharge.  She had a history of type 2 diabetes that is insulin requiring A1c was 9.2 she was placed back on her NPH insulin and AC lispro was added together with a sliding scale and this has improved her Accu-Cheks her insulin should be titrated as an outpatient.  She appears to have a obstructive sleep apnea and pulmonary will follow up as an outpatient.  She developed anemia anemia work-up revealed anemia of chronic disease and her hemoglobin remained stable.  She developed dysmotility PT and OT evaluated the patient recommended physical therapy in a skilled facility.  Her GERD was uncontrolled during her hospital stay and her Protonix was increased and she was started on as needed Tums and this has improved her symptomatology.  She had left hip pain osteoarthritis was diagnosed by x-ray and she is to continue the Tylenol and physical therapy.  At the time of discharge she was in a hemodynamically stable condition.  She is to follow-up with her primary MD in 3 days at the skilled facility at that time a CBC/BMP/INR should be checked.  She is also to follow-up with the pulmonologist.    Consultants     Consult Orders (all) (From admission, onward)     Start     Ordered    11/07/21 0844  Inpatient Case Management  Consult  Once        Provider:  (Not yet assigned)    11/07/21 0843    11/02/21 1134  Inpatient Pulmonology Consult  Once        Specialty:  Pulmonary Disease  Provider:  Oren Goodrich MD    11/02/21 1134    11/02/21 0800  Inpatient Case Management   Consult  Once        Provider:  (Not yet assigned)    11/02/21 0457 11/02/21 0457  Inpatient Diabetes Educator Consult  Once        Provider:  (Not yet assigned)    11/02/21 0457 11/02/21 0035  Inpatient Cardiology Consult  Once        Specialty:  Cardiology  Provider:  Pallavi Burnette MD    11/02/21 0034 11/01/21 2232  [facility] (on-call MD unless specified)  Once        Specialty:  Cardiology  Provider:  (Not yet assigned)    11/01/21 2231 11/01/21 2209  [facility] (on-call MD unless specified) Details  Once        Specialty:  Hospitalist  Provider:  (Not yet assigned)    11/01/21 2208              Procedures     Imaging Results (All)     Procedure Component Value Units Date/Time    XR Hip With or Without Pelvis 2 - 3 View Left [[id number]] Collected: 11/05/21 1543     Updated: 11/05/21 1548    Narrative:      XR HIP W OR WO PELVIS 2-3 VIEW LEFT-     INDICATIONS: Pain, no injury     TECHNIQUE: 6 images were obtained, including the pelvis and left hip     COMPARISON: None available     FINDINGS:     Severe left and moderate to severe right hip joint space narrowing  noted. Degenerative subcortical eburnation is seen on both sides of the  left hip joint, with marginal spurring. Assessment somewhat limited by  difficulty with patient positioning and body habitus. Degenerative  changes are also noted in the partly included lumbar spine.  Follow-up/further evaluation can be obtained as indications persist.       Impression:         As described.     This report was finalized on 11/5/2021 3:45 PM by Dr. Deniz Jimenez M.D.       XR Chest 1 View [[id number]] Collected: 11/01/21 2016     Updated: 11/02/21 1324    Narrative:      PORTABLE CHEST     HISTORY: Cough.     FINDINGS: A single view of the chest was obtained. The study is hampered  by the patient's body habitus. The heart is at the upper limits of  normal in size. There is no evidence of infiltrate, effusion or of  congestive failure.     This report was finalized  on 11/2/2021 1:21 PM by Dr. Pablito Barragan M.D.       CT Angiogram Chest With & Without Contrast [[id number]] Collected: 11/02/21 1012     Updated: 11/02/21 1031    Narrative:      CT ANGIOGRAM OF THE CHEST. MULTIPLE CORONAL, SAGITTAL, AND 3D  RECONSTRUCTIONS     HISTORY: 71-year-old female with shortness of breath. Evaluate for  pulmonary thromboemboli.     TECHNIQUE: Radiation dose reduction techniques were utilized, including  automated exposure control and exposure modulation based on body size.   CT angiogram of the chest was performed following the administration of  IV contrast. Multiple coronal, sagittal, and 3D reconstruction images  were obtained. There is no previous CT for comparison.     FINDINGS: There is thrombus within the distal right main pulmonary  artery extending into the right upper, right middle, and lower lobe  pulmonary arteries. There is a smaller volume of pulmonary thromboemboli  within the lingular and left lower lobe pulmonary arteries. There is no  thrombus within the left main pulmonary artery. The RV:LV ratio is 1.6  (4.6/2.8). There are no airspace consolidations other than mild  dependent atelectatic changes. There are no pleural or pericardial  effusions. There is no lymphadenopathy within the chest. Gastric banding  is noted at the visualized upper abdomen. There is fairly extensive  fatty infiltration of the liver.       Impression:      Bilateral pulmonary thromboemboli as described. The RV:LV  ratio is 1.6.     Discussed with the patient's nurse by phone.     This report was finalized on 11/2/2021 10:28 AM by Dr. Valerie Martinez M.D.       CT Head Without Contrast [[id number]] Collected: 11/01/21 2152     Updated: 11/01/21 2203    Narrative:      CT HEAD WITHOUT CONTRAST     HISTORY: Syncope     COMPARISON: None available.     TECHNIQUE: Axial CT imaging was obtained through the brain. No IV  contrast was administered.     FINDINGS:  No acute intracranial hemorrhage is seen.  Ventricles are normal in size.  There is no midline shift or mass effect. No calvarial fracture is seen.  There is mucosal thickening noted within the left maxillary sinus. There  is also a mucous retention cyst. Mastoid air cells appear clear.       Impression:      No acute intracranial findings.     Radiation dose reduction techniques were utilized, including automated  exposure control and exposure modulation based on body size.     This report was finalized on 11/1/2021 10:00 PM by Dr. Josie Malik M.D.             Pertinent Labs     Results from last 7 days   Lab Units 11/10/21  0559 11/09/21  0551 11/08/21  0542 11/07/21  0649   WBC 10*3/mm3 10.04 10.82* 13.33* 11.45*   HEMOGLOBIN g/dL 9.7* 9.7* 9.1* 9.7*   PLATELETS 10*3/mm3 258 263 254 199     Results from last 7 days   Lab Units 11/10/21  0559 11/09/21  0551 11/08/21  0542 11/07/21  0649   SODIUM mmol/L 136 133* 137 134*   POTASSIUM mmol/L 4.7 5.0 4.6 5.1   CHLORIDE mmol/L 100 95* 101 98   CO2 mmol/L 25.6 27.2 27.0 18.9*   BUN mg/dL 19 18 15 16   CREATININE mg/dL 1.28* 1.47* 1.31* 1.21*   GLUCOSE mg/dL 197* 154* 67 138*   Estimated Creatinine Clearance: 60.3 mL/min (A) (by C-G formula based on SCr of 1.28 mg/dL (H)).  Results from last 7 days   Lab Units 11/06/21 0345   ALBUMIN g/dL 3.40*   BILIRUBIN mg/dL 0.3   ALK PHOS U/L 95   AST (SGOT) U/L 27   ALT (SGPT) U/L 24     Results from last 7 days   Lab Units 11/10/21  0559 11/09/21  0551 11/08/21 0542 11/07/21 0649 11/06/21 0345 11/06/21 0345 11/05/21  0545 11/05/21  0545   CALCIUM mg/dL 9.3 9.5 9.1 9.0   < > 9.3   < > 8.8   ALBUMIN g/dL  --   --   --   --   --  3.40*  --   --    MAGNESIUM mg/dL  --   --   --   --   --   --   --  1.7    < > = values in this interval not displayed.               Invalid input(s): LDLCALC      Imaging Results (Last 24 Hours)     ** No results found for the last 24 hours. **          Test Results Pending at Discharge         Discharge Exam   Physical Exam  Vital  Signs  Temp:  [97.4 °F (36.3 °C)-98.9 °F (37.2 °C)] 97.4 °F (36.3 °C)  Heart Rate:  [72-87] 72  Resp:  [18] 18  BP: (123-165)/(62-71) 165/69  SpO2:  [96 %-100 %] 100 %  on  Flow (L/min):  [1-2] 2;   Device (Oxygen Therapy): nasal cannula     Intake/Output Summary (Last 24 hours) at 11/10/2021 0822  Last data filed at 11/10/2021 0503      Gross per 24 hour   Intake 1200 ml   Output --   Net 1200 ml      Body mass index is 48.77 kg/m².  Vitals               11/08/21  0639 11/09/21  0622 11/10/21  0503   Weight: (!) 142 kg (312 lb 6.4 oz) (patient felt light headed, asked to get weight later) (!) 141 kg (311 lb 6.4 oz)         Physical Exam  General.  Middle-aged female.  Obese.  Alert and oriented x3.  No respiratory distress..  No pain.  No diaphoresis.   Eyes.  Pupils equal round and reactive.  Intact extraocular musculature.  No pallor or jaundice.  Normal conjunctive and lids.  Oral cavity.  Moist mucous membrane.   Neck.  Supple.  No JVD.  No lymphadenopathy or thyromegaly.  Cardiovascular.  Regular rate and rhythm.  No gallops or murmurs.  Chest.  Poor but clear to auscultation bilaterally with no added sounds.  Abdomen.  Obese.  Soft lax.  No tenderness.  No organomegaly.  No guarding or rebound.  Extremities.  +2 bilateral lower extremity edema.  No clubbing or cyanosis.  CNS.  No acute focal neurological deficits.       Discharge Details        Discharge Medications      New Medications      Instructions Start Date   calcium carbonate 500 MG chewable tablet  Commonly known as: TUMS   1 tablet, Oral, 3 Times Daily PRN      furosemide 20 MG tablet  Commonly known as: LASIX   20 mg, Oral, Daily   Start Date: November 11, 2021     insulin lispro 100 UNIT/ML injection  Commonly known as: ADMELOG   0-14 Units, Subcutaneous, 3 Times Daily Before Meals      insulin lispro 100 UNIT/ML injection  Commonly known as: ADMELOG   5 Units, Subcutaneous, 3 Times Daily With Meals      lisinopril 10 MG tablet  Commonly known  as: PRINIVIL,ZESTRIL   10 mg, Oral, Every 24 Hours Scheduled   Start Date: November 11, 2021     ondansetron 4 MG tablet  Commonly known as: ZOFRAN   4 mg, Oral, Every 6 Hours PRN      pantoprazole 40 MG EC tablet  Commonly known as: PROTONIX  Replaces: omeprazole 40 MG capsule   40 mg, Oral, 2 Times Daily Before Meals      warfarin 7.5 MG tablet  Commonly known as: COUMADIN   1 tablet every day         Continue These Medications      Instructions Start Date   allopurinol 100 MG tablet  Commonly known as: ZYLOPRIM   100 mg, Oral, Nightly      amLODIPine 10 MG tablet  Commonly known as: NORVASC   10 mg, Oral, Every Night at Bedtime      cholecalciferol 25 MCG (1000 UT) tablet  Commonly known as: VITAMIN D3   2,000 Units, Oral, Daily      gabapentin 300 MG capsule  Commonly known as: NEURONTIN   300 mg, Oral, 2 Times Daily      insulin NPH-insulin regular (70-30) 100 UNIT/ML injection  Commonly known as: humuLIN 70/30,novoLIN 70/30   50 Units, Subcutaneous, Every Morning      insulin NPH-insulin regular (70-30) 100 UNIT/ML injection  Commonly known as: humuLIN 70/30,novoLIN 70/30   30 Units, Subcutaneous, Nightly      ursodiol 300 MG capsule  Commonly known as: ACTIGALL   500 mg, Oral, 2 Times Daily         Stop These Medications    lisinopril-hydrochlorothiazide 20-12.5 MG per tablet  Commonly known as: PRINZIDE,ZESTORETIC     meloxicam 15 MG tablet  Commonly known as: MOBIC     omeprazole 40 MG capsule  Commonly known as: priLOSEC  Replaced by: pantoprazole 40 MG EC tablet     potassium chloride 10 MEQ CR tablet            No Known Allergies      Discharge Disposition:  Condition: Stable    Diet:   Diet Order   Procedures   • Diet Regular; Consistent Carbohydrate, Cardiac       Activity:   Activity Instructions     Activity as Tolerated      Other Activity Instructions      Activity Instructions: PT/OT to evaluate and treat    Up WIth Assist            Counseling : No nonsteroidal anti-inflammatory  medication    CODE STATUS:    Code Status and Medical Interventions:   Ordered at: 11/02/21 1440     Level Of Support Discussed With:    Patient     Code Status (Patient has no pulse and is not breathing):    CPR (Attempt to Resuscitate)     Medical Interventions (Patient has pulse or is breathing):    Full Support       Future Appointments   Date Time Provider Department Center   12/15/2021  2:45 PM [facility]  [facility]   12/15/2021  3:15 PM Chris Pritchett MD [facility]     Additional Instructions for the Follow-ups that You Need to Schedule     Call MD With Problems / Concerns   As directed      Instructions: Call MD or return to ER if fever and chills/worsening shortness of breath/hemoptysis/chest pain/nausea or vomiting abnormal blood sugar and blood pressure.    Order Comments: Instructions: Call MD or return to ER if fever and chills/worsening shortness of breath/hemoptysis/chest pain/nausea or vomiting abnormal blood sugar and blood pressure.          Discharge Follow-up with PCP   As directed       Currently Documented PCP:    No primary care provider on file.    PCP Phone Number:    None     Follow Up Details: Primary MD at the skilled facility for pulmonary emboli/hypoxemic respiratory failure/hypertension/acute kidney failure/anticoagulation.  Please check CBC/BMP/INR in 3 days         Discharge Follow-up with Specified Provider: Pulmonary; 2 Weeks   As directed      To: Pulmonary    Follow Up: 2 Weeks    Follow Up Details: Bilateral pulmonary emboli and acute hypoxemic respiratory failure/obstructive sleep apnea            Contact information for after-discharge care     Destination     [facility] .    Service: Skilled Nursing  Contact information:  [address] [facility] [address] [phone]                               Time Spent on Discharge:  Greater than 30 minutes      Laci Gordon MD  [facility]  11/10/21  15:48 EST

## 2021-11-10 NOTE — PROGRESS NOTES
"Adult Nutrition  Assessment/PES    Patient Name:  Grace Renee  YOB: 1950  MRN: [id number]  Admit Date:  11/1/2021    Assessment Date:  11/10/2021    Comments:  Nutrition screen per LOS. t on Regular Consistent Carb, Cardiac diet with good po intake %. Will remain available as needed.      Reason for Assessment     Row Name 11/10/21 0944          Reason for Assessment    Reason For Assessment other (see comments)  LOS     Diagnosis --  Acute hypoxic aspiratory failure, acute pulmonary embolism, HTN, DM, DINAH                Nutrition/Diet History     Row Name 11/10/21 0944          Nutrition/Diet History    Typical Food/Fluid Intake good po intake                Anthropometrics     Row Name 11/10/21 0944 11/10/21 0503       Anthropometrics    Height 170.2 cm (67.01\") --    Weight 141 kg (311 lb 6.4 oz)  not weighed by  kg (311 lb 6.4 oz)       Ideal Body Weight (IBW)    Ideal Body Weight (IBW) (kg) 61.88 --    % Ideal Body Weight 228.26 --       Body Mass Index (BMI)    BMI (kg/m2) 48.86 --    BMI Assessment BMI 40 or greater: obesity grade III --               Labs/Tests/Procedures/Meds     Row Name 11/10/21 0945          Labs/Procedures/Meds    Lab Results Reviewed reviewed     Lab Results Comments glu            Diagnostic Tests/Procedures    Diagnostic Test/Procedure Reviewed reviewed            Medications    Pertinent Medications Reviewed reviewed                Physical Findings     Row Name 11/10/21 0945          Physical Findings    Overall Physical Appearance obese                Estimated/Assessed Needs     Row Name 11/10/21 0944          Calculation Measurements    Height 170.2 cm (67.01\")                Nutrition Prescription Ordered     Row Name 11/10/21 0945          Nutrition Prescription PO    Current PO Diet Regular     Common Modifiers Cardiac; Consistent Carbohydrate                       Problem/Interventions:   Problem 1     Row Name 11/10/21 0945          Nutrition " Diagnoses Problem 1    Problem 1 Nutrition Appropriate for Condition at this Time     Etiology (related to) Medical Diagnosis                      Intervention Goal     Row Name 11/10/21 0945          Intervention Goal    General Maintain nutrition     PO Maintain intake     Weight Appropriate weight loss                Nutrition Intervention     Row Name 11/10/21 0945          Nutrition Intervention    RD/Tech Action Follow Tx progress; Care plan reviewd; Menu provided                  Education/Evaluation     Row Name 11/10/21 0945          Education    Education Will Instruct as appropriate            Monitor/Evaluation    Monitor Per protocol                 Electronically signed by:  Jackie Silva RD  11/10/21 09:46 EST

## 2021-11-10 NOTE — PROGRESS NOTES
Name: Grace Renee ADMIT: 2021   : 1950  PCP: No primary care provider on file.    MRN: [id number] LOS: 8 days   AGE/SEX: 71 y.o. female  ROOM: [facility]     Subjective   Subjective   Patient feels well.  Positive occasional dry cough.  No chest pain.  No palpitation.  No fever or chills.  No hemoptysis.    Review of Systems  GI.  No abdominal pain.  No nausea or vomiting.  Normal bowel habits without constipation/diarrhea/bleeding per rectum/melena  .  No dysuria or hematuria.       Objective   Objective   Vital Signs  Temp:  [97.4 °F (36.3 °C)-98.9 °F (37.2 °C)] 97.4 °F (36.3 °C)  Heart Rate:  [72-87] 72  Resp:  [18] 18  BP: (123-165)/(62-71) 165/69  SpO2:  [96 %-100 %] 100 %  on  Flow (L/min):  [1-2] 2;   Device (Oxygen Therapy): nasal cannula    Intake/Output Summary (Last 24 hours) at 11/10/2021 0822  Last data filed at 11/10/2021 0503  Gross per 24 hour   Intake 1200 ml   Output --   Net 1200 ml     Body mass index is 48.77 kg/m².      21  0639 21  0622 11/10/21  0503   Weight: (!) 142 kg (312 lb 6.4 oz) (patient felt light headed, asked to get weight later) (!) 141 kg (311 lb 6.4 oz)     Physical Exam  General.  Middle-aged female.  Obese.  Alert and oriented x3.  No respiratory distress..  No pain.  No diaphoresis.   Eyes.  Pupils equal round and reactive.  Intact extraocular musculature.  No pallor or jaundice.  Normal conjunctive and lids.  Oral cavity.  Moist mucous membrane.   Neck.  Supple.  No JVD.  No lymphadenopathy or thyromegaly.  Cardiovascular.  Regular rate and rhythm.  No gallops or murmurs.  Chest.  Poor but clear to auscultation bilaterally with no added sounds.  Abdomen.  Obese.  Soft lax.  No tenderness.  No organomegaly.  No guarding or rebound.  Extremities.  +2 bilateral lower extremity edema.  No clubbing or cyanosis.  CNS.  No acute focal neurological deficits.     Results Review:      Results from last 7 days   Lab Units 11/10/21  0559 21  0551  11/08/21  0542 11/07/21  0649 11/06/21  0345 11/05/21  0545   SODIUM mmol/L 136 133* 137 134* 133* 133*   POTASSIUM mmol/L 4.7 5.0 4.6 5.1 4.9 4.6   CHLORIDE mmol/L 100 95* 101 98 98 99   CO2 mmol/L 25.6 27.2 27.0 18.9* 23.9 24.9   BUN mg/dL 19 18 15 16 15 15   CREATININE mg/dL 1.28* 1.47* 1.31* 1.21* 1.18* 1.05*   GLUCOSE mg/dL 197* 154* 67 138* 304* 309*   CALCIUM mg/dL 9.3 9.5 9.1 9.0 9.3 8.8   AST (SGOT) U/L  --   --   --   --  27  --    ALT (SGPT) U/L  --   --   --   --  24  --      Estimated Creatinine Clearance: 60.3 mL/min (A) (by C-G formula based on SCr of 1.28 mg/dL (H)).      Results from last 7 days   Lab Units 11/10/21  0545 11/09/21 2057 11/09/21  1604 11/09/21  1109 11/09/21  0602 11/09/21  0418 11/08/21  2118 11/08/21  1608   GLUCOSE mg/dL 238* 222* 118 285* 164* 183* 381* 343*                 Results from last 7 days   Lab Units 11/05/21  0545   MAGNESIUM mg/dL 1.7           Invalid input(s): LDLCALC  Results from last 7 days   Lab Units 11/10/21  0559 11/09/21  0551 11/09/21  0551 11/08/21  0542 11/08/21  0542 11/07/21  0649 11/07/21  0649 11/06/21  0339 11/06/21  0339 11/05/21  0545 11/05/21  0545 11/04/21  0546 11/04/21  0546   WBC 10*3/mm3 10.04  --  10.82*  --  13.33*  --  11.45*  --  11.43*  --  9.45  --  9.87   HEMOGLOBIN g/dL 9.7*  --  9.7*  --  9.1*  --  9.7*  --  9.5*  --  9.4*  --  9.8*   HEMATOCRIT % 29.5*  --  29.6*  --  27.9*  --  30.5*  --  29.2*  --  30.5*  --  30.9*   PLATELETS 10*3/mm3 258  --  263  --  254  --  199  --  204  --  209  --  205   MCV fL 87.0   < > 85.8   < > 86.9   < > 87.4   < > 85.9   < > 88.9   < > 88.0   MCH pg 28.6   < > 28.1   < > 28.3   < > 27.8   < > 27.9   < > 27.4   < > 27.9   MCHC g/dL 32.9   < > 32.8   < > 32.6   < > 31.8   < > 32.5   < > 30.8*   < > 31.7   RDW % 13.1   < > 12.9   < > 13.1   < > 12.9   < > 12.6   < > 12.6   < > 12.5   RDW-SD fl 41.4   < > 39.2   < > 40.6   < > 40.6   < > 38.1   < > 40.7   < > 39.7   MPV fL 11.1   < > 11.2   < > 10.7   <  > 11.8   < > 11.1   < > 11.9   < > 10.7   NEUTROPHIL % % 49.4   < > 52.5   < > 47.3   < > 52.4   < > 52.8   < > 54.7   < > 53.0   LYMPHOCYTE % % 32.3   < > 28.5   < > 34.9   < > 30.7   < > 31.0   < > 27.8   < > 28.8   MONOCYTES % % 10.4   < > 10.4   < > 9.4   < > 8.6   < > 8.0   < > 9.2   < > 9.4   EOSINOPHIL % % 5.6   < > 5.6   < > 5.8   < > 5.9   < > 6.2   < > 6.5*   < > 7.3*   BASOPHIL % % 1.0   < > 1.2   < > 0.9   < > 1.2   < > 1.0   < > 1.0   < > 1.0   IMM GRAN % % 1.3*   < > 1.8*   < > 1.7*   < > 1.2*   < > 1.0*   < > 0.8*   < > 0.5   NEUTROS ABS 10*3/mm3 4.97   < > 5.68   < > 6.31   < > 6.00   < > 6.03   < > 5.17   < > 5.23   LYMPHS ABS 10*3/mm3 3.24*   < > 3.08   < > 4.65*   < > 3.51*   < > 3.54*   < > 2.63   < > 2.84   MONOS ABS 10*3/mm3 1.04*   < > 1.12*   < > 1.25*   < > 0.99*   < > 0.92*   < > 0.87   < > 0.93*   EOS ABS 10*3/mm3 0.56*   < > 0.61*   < > 0.77*   < > 0.67*   < > 0.71*   < > 0.61*   < > 0.72*   BASOS ABS 10*3/mm3 0.10   < > 0.13   < > 0.12   < > 0.14   < > 0.11   < > 0.09   < > 0.10   IMMATURE GRANS (ABS) 10*3/mm3 0.13*   < > 0.20*   < > 0.23*   < > 0.14*   < > 0.12*   < > 0.08*   < > 0.05   NRBC /100 WBC 0.2   < > 0.4*   < > 0.4*   < > 0.2   < > 0.2   < > 0.1   < > 0.1    < > = values in this interval not displayed.     Results from last 7 days   Lab Units 11/10/21  0559 11/09/21  0959 11/09/21  0551 11/08/21  2104 11/08/21  1510 11/08/21  0542 11/07/21  1736 11/07/21  0649 11/07/21  0649 11/06/21  1128 11/06/21  0345 11/05/21  1650 11/05/21  0545 11/04/21  1513 11/04/21  0546   INR  1.82*  --  1.56*  --   --  1.47*  --   --  1.23*  --  1.09  --  1.18* 1.12*  --    APTT seconds 70.6* 84.8* 74.5* 186.5* 70.1* 114.7* >200.0*   < > 68.5*   < > 146.7*   < > 100.7*  --    < >    < > = values in this interval not displayed.                                       Imaging:  Imaging Results (Last 24 Hours)     ** No results found for the last 24 hours. **             I reviewed the patient's new  clinical results / labs / tests / procedures      Assessment/Plan     Active Hospital Problems    Diagnosis  POA   • **Bilateral pulmonary embolism (HCC) [I26.99]  Yes   • Osteoarthritis of left hip [M16.12]  Yes   • Anemia [D64.9]  No   • Acute respiratory failure with hypoxia (HCC) [J96.01]  Yes   • Left hip pain [M25.552]  Yes   • Carpal tunnel syndrome, bilateral [G56.03]  Yes   • Sleep apnea [G47.30]  Yes   • Hypertension [I10]  Yes   • GERD (gastroesophageal reflux disease) [K21.9]  Yes   • Elevated cholesterol [E78.00]  Yes   • Type 2 diabetes mellitus, with long-term current use of insulin (HCC) [E11.9, Z79.4]  Not Applicable   • DINAH (acute kidney injury) (HCC) [N17.9]  Yes   • Syncope [R55]  Yes      Resolved Hospital Problems   No resolved problems to display.       Syncope secondary to bilateral pulmonary emboli with cor pulmonale and right ventricular stain leading to acute hypoxemic respiratory failure and hypotension.  Status post mechanical thrombectomy.  Currently on heparin being transitioned to Coumadin.  Pharmacy adjusting Coumadin dose and following INR.  Cardiology saw patient and believes this is an acute on top of chronic blood moderate emboli as there has not been any significant change in the pulmonary pressures.  Lower extremity venous ultrasound is negative.  Pulmonary also saw the patient.  Cardiology and pulmonary has signed off. Both consults appreciated.  Failed weaning off oxygen and will need oxygen at discharge.  INR mildly subtherapeutic and we need to reach the target of 2-3.  Pharmacy is adjusting Coumadin dose..    Acute kidney failure.  No baseline creatinine available.  Most likely secondary to prerenal azotemia because of hypotension at admission and the patient was taking ACE inhibitors.  Kidney function initially improved but worsened again after initiation of lisinopril.   lisinopril and hydrochlorothiazide DC'd and Norvasc increased.  Patient appears to be volume  overloaded and this has improved today after initiation of Lasix yesterday.  Improving renal function and good urine output..    Will continue to monitor.  Patient might have a stage III renal failure before admission.  History of hypertension and elevated troponin.  Chest pain is atypical has since resolved..  Troponin elevation mostly secondary to PE.  Good blood pressure control with no evidence of angina or congestive heart failure on at this time DC'd hydrochlorothiazide and lisinopril secondary to relapse of the acute kidney failure.  Good blood pressure control after increasing Norvasc and on Lasix..    Type 2 diabetes.  A1c 9.2.  Much improved Accu-Chek after switching the patient back to her original dose of NPH and placing her on lispro AC.  Will need to gradually titrate the insulin..   obstructive sleep apnea.  Sleep study as outpatient per pulmonary  Chronic disease anemia.  Hemoglobin is stable.  Continue to monitor.    Motility disorder.  On PT.  PT recommends skilled facility.  GERD.  Resolved with increasing Protonix and on as needed Tums.  Left hip osteoarthritis.  Continue Tylenol and physical therapy.      Discussed with the patient/nurse/discharge planner.  Can be discharged later on this afternoon or tomorrow pending availability of a bed at the skilled facility.  Will need INR follow-up and pulmonary follow-up.    Laci Gordon MD  [facility]  11/10/21  08:22 EST

## 2021-11-10 NOTE — CASE MANAGEMENT/SOCIAL WORK
Continued Stay Note  [facility]     Patient Name: Grace Renee  MRN: [id number]  Today's Date: 11/10/2021    Admit Date: 11/1/2021     Discharge Plan     Row Name 11/10/21 1006       Plan    Plan Referral to [facility] vs [facility].    Patient/Family in Agreement with Plan yes    Plan Comments Pt is unsure of going to [facility]. ABI explained not going to [facility], but [facility] in [place] has accepted her. She asked for referral to [facility]. CCP explained that is acute rehab, and explained acute vs subacute criteria, but would make referral per her request. CCP spoke with [facility] [phone] via outbound call for referral to [facility]. OT eval pending and asked PT to see this am for updated therapy notes. ABI spoke with Stevie[facility] [phone], pt will be in a separate hallway from LTC residents and can speak with pt and provide virtual tour. CCP explained would update him after call back from [facility]. Awaiting INR to be therapeutic. Packet in CCP Office. Kathy ORTIZ/CCP               Discharge Codes    No documentation.               Expected Discharge Date and Time     Expected Discharge Date Expected Discharge Time    Nov 10, 2021             Alexandra Buchanan RN

## 2021-11-10 NOTE — THERAPY EVALUATION
Patient Name: Grace Renee  : 1950    MRN: [id number]                              Today's Date: 11/10/2021       Admit Date: 2021    Visit Dx:     ICD-10-CM ICD-9-CM   1. Syncope, unspecified syncope type  R55 780.2   2. Elevated troponin  R77.8 790.6   3. Bilateral pulmonary embolism (HCC)  I26.99 415.19     Patient Active Problem List   Diagnosis   • Syncope   • Left hip pain   • Carpal tunnel syndrome, bilateral   • Bilateral pulmonary embolism (HCC)   • Sleep apnea   • Hypertension   • GERD (gastroesophageal reflux disease)   • Elevated cholesterol   • Type 2 diabetes mellitus, with long-term current use of insulin (HCC)   • DINAH (acute kidney injury) (HCC)   • Acute respiratory failure with hypoxia (HCC)   • Anemia   • Osteoarthritis of left hip     Past Medical History:   Diagnosis Date   • Diabetes mellitus (HCC)    • Elevated cholesterol    • GERD (gastroesophageal reflux disease)    • Hypertension    • Osteoarthritis of left hip 2021   • Sleep apnea      Past Surgical History:   Procedure Laterality Date   • CARDIAC CATHETERIZATION Bilateral 2021    Procedure: Percutaneous Mechanical Thrombectomy- INARI;  Surgeon: Chris Pritchett MD;  Location:  [facility];  Service: Cardiovascular;  Laterality: Bilateral;   • CARDIAC CATHETERIZATION N/A 2021    Procedure: Right Heart Cath;  Surgeon: Chris Pritchett MD;  Location:  [facility];  Service: Cardiovascular;  Laterality: N/A;   • INTERVENTIONAL RADIOLOGY PROCEDURE Bilateral 2021    Procedure: Pulmonary Angiogram;  Surgeon: Chris Pritchett MD;  Location:  [facility];  Service: Cardiovascular;  Laterality: Bilateral;      General Information     Row Name 11/10/21 1310          OT Time and Intention    Document Type evaluation; therapy note (daily note)  -     Mode of Treatment individual therapy; occupational therapy  -     Row Name 11/10/21 1316          General  Information    Patient Profile Reviewed yes  -     Prior Level of Function independent:; transfer; community mobility; all household mobility; bed mobility; ADL's  -     Existing Precautions/Restrictions fall  -     Barriers to Rehab none identified  -     Row Name 11/10/21 1310          Cognition    Orientation Status (Cognition) oriented x 4  -     Row Name 11/10/21 1310          Safety Issues, Functional Mobility    Impairments Affecting Function (Mobility) balance; endurance/activity tolerance; strength  -           User Key  (r) = Recorded By, (t) = Taken By, (c) = Cosigned By    Initials Name Provider Type    Juli Alonzo OTR Occupational Therapist                 Mobility/ADL's     Row Name 11/10/21 1311          Bed Mobility    Comment (Bed Mobility) UIC  -     Row Name 11/10/21 1311          Transfers    Transfers sit-stand transfer  -     Sit-Stand [place] (Transfers) set up; standby assist; contact guard  -     Row Name 11/10/21 1311          Functional Mobility    Functional Mobility- Comment attached to IV in wall, not much space, deferred, pt walked recently today.  -     Row Name 11/10/21 1311          Activities of Daily Living    BADL Assessment/Intervention lower body dressing; bathing  -     Row Name 11/10/21 1311          Lower Body Dressing Assessment/Training    [place] Level (Lower Body Dressing) lower body dressing skills; doff; don; socks; dependent (less than 25% patient effort)  -     Position (Lower Body Dressing) supported sitting  -     Comment (Lower Body Dressing) states had a hard time before. recommend a sock aide.  -     Row Name 11/10/21 1311          Bathing Assessment/Intervention    Comment (Bathing) already completed, states nsg aide A w her lower legs/feet  -           User Key  (r) = Recorded By, (t) = Taken By, (c) = Cosigned By    Initials Name Provider Type    Juli Alonzo OTR Occupational Therapist                Obj/Interventions     Row Name 11/10/21 1312          Vision Assessment/Intervention    Visual Impairment/Limitations WFL  -[facility] Name 11/10/21 1312          Range of Motion Comprehensive    General Range of Motion bilateral upper extremity ROM WNL  -     Comment, General Range of Motion B UE 8/8  -     Row Name 11/10/21 1312          Strength Comprehensive (MMT)    General Manual Muscle Testing (MMT) Assessment upper extremity strength deficits identified  -     Comment, General Manual Muscle Testing (MMT) Assessment B UE 4/5  -[facility] Name 11/10/21 1312          Shoulder (Therapeutic Exercise)    Shoulder (Therapeutic Exercise) AROM (active range of motion)  -     Shoulder AROM (Therapeutic Exercise) bilateral; flexion; extension; scapular retraction; scapular protraction; 10 repetitions; sitting; 2 sets  -[facility] Name 11/10/21 1312          Elbow/Forearm (Therapeutic Exercise)    Elbow/Forearm (Therapeutic Exercise) AROM (active range of motion)  -     Elbow/Forearm AROM (Therapeutic Exercise) bilateral; flexion; extension; supination; pronation; 2 sets; sitting; 10 repetitions  -[facility] Name 11/10/21 1312          Motor Skills    Motor Skills coordination; functional endurance  -     Coordination WFL  -     Functional Endurance good  -[facility] Name 11/10/21 1312          Balance    Balance Assessment sitting static balance  -     Static Sitting Balance WFL; sitting in chair  -     Dynamic Sitting Balance WFL  -     Static Standing Balance WFL  -     Dynamic Standing Balance mild impairment  -     Balance Interventions sitting; standing; supported; sit to stand; static  -[facility] Name 11/10/21 1312          Therapeutic Exercise    Therapeutic Exercise elbow/forearm; shoulder  -           User Key  (r) = Recorded By, (t) = Taken By, (c) = Cosigned By    Initials Name Provider Type    Juli Alonzo OTR Occupational Therapist               Goals/Plan      Row Name 11/10/21 1316          Transfer Goal 1 (OT)    Activity/Assistive Device (Transfer Goal 1, OT) bed-to-chair/chair-to-bed; sit-to-stand/stand-to-sit; toilet  -KP     [place] Level/Cues Needed (Transfer Goal 1, OT) set-up required; modified independence; standby assist  -KP     Time Frame (Transfer Goal 1, OT) short term goal (STG); 1 week  -KP     Progress/Outcome (Transfer Goal 1, OT) goal ongoing  -     Row Name 11/10/21 1316          Bathing Goal 1 (OT)    Activity/Device (Bathing Goal 1, OT) bathing skills, all; long-handled sponge  -KP     [place] Level/Cues Needed (Bathing Goal 1, OT) set-up required; contact guard assist  -KP     Time Frame (Bathing Goal 1, OT) short term goal (STG); 1 week  -KP     Progress/Outcomes (Bathing Goal 1, OT) goal ongoing  -     Row Name 11/10/21 1316          Strength Goal 1 (OT)    Strength Goal 1 (OT) incr UE to 4+/5  -KP     Time Frame (Strength Goal 1, OT) short term goal (STG); 1 week  -KP     Progress/Outcome (Strength Goal 1, OT) goal ongoing  -     Row Name 11/10/21 1316          Therapy Assessment/Plan (OT)    Planned Therapy Interventions (OT) activity tolerance training; functional balance retraining; occupation/activity based interventions; BADL retraining; adaptive equipment training; transfer/mobility retraining; strengthening exercise; ROM/therapeutic exercise  -           User Key  (r) = Recorded By, (t) = Taken By, (c) = Cosigned By    Initials Name Provider Type    Juli Alonzo, OTR Occupational Therapist               Clinical Impression     Row Name 11/10/21 1313          Pain Scale: Numbers Pre/Post-Treatment    Pretreatment Pain Rating 0/10 - no pain  -KP     Posttreatment Pain Rating 0/10 - no pain  -     Row Name 11/10/21 1313          Plan of Care Review    Plan of Care Reviewed With patient  -     Progress improving  -     Outcome Summary pt admitted on 11/1/21 w. B pulmonary embolism. pt was independent  PTA and her niece lives with her, she had A from her getting into/out of her tub for safety, and is wanting to get grab bars but would love to turn it into a walk in one day so it will be safer. pt has a reacher at home. pt has difficulty with socks but did prior as well. ed on trying a sock aide to A w socks and reacher for pants and taking socks off. Pt ed on UE ex to incr strength/endurnace, pt complete AROM 10x2 B UE . pt stood w CGA/SBA. Able to test her strength when she was standing which demo pretty good balance, i would recommend acute rehab or home w HH and A from family. Cont therapy to incr ADL, strength, balance, tsf, and AE training.  -     Row Name 11/10/21 1313          Therapy Assessment/Plan (OT)    Rehab Potential (OT) good, to achieve stated therapy goals  -     Criteria for Skilled Therapeutic Interventions Met (OT) yes; meets criteria; skilled treatment is necessary  -     Therapy Frequency (OT) 5 times/wk  -     Row Name 11/10/21 1313          Therapy Plan Review/Discharge Plan (OT)    Equipment Needs Upon Discharge (OT) shower chair  sock aide  -     Anticipated Discharge Disposition (OT) home with home health; home with assist; inpatient rehabilitation facility  -     Row Name 11/10/21 1313          Positioning and Restraints    Pre-Treatment Position sitting in chair/recliner  -     Post Treatment Position chair  -KP     In Chair sitting; call light within reach; encouraged to call for assist  -           User Key  (r) = Recorded By, (t) = Taken By, (c) = Cosigned By    Initials Name Provider Type     Juli Melgar, OTR Occupational Therapist               Outcome Measures     Row Name 11/10/21 7487          How much help from another is currently needed...    Putting on and taking off regular lower body clothing? 1  -KP     Bathing (including washing, rinsing, and drying) 3  -KP     Toileting (which includes using toilet bed pan or urinal) 3  -KP     Putting on  and taking off regular upper body clothing 3  -KP     Taking care of personal grooming (such as brushing teeth) 4  -KP     Eating meals 4  -     AM-PAC 6 Clicks Score (OT) 18  -     Row Name 11/10/21 1210          How much help from another person do you currently need...    Turning from your back to your side while in flat bed without using bedrails? 4  -PH     Moving from lying on back to sitting on the side of a flat bed without bedrails? 3  -PH     Moving to and from a bed to a chair (including a wheelchair)? 3  -PH     Standing up from a chair using your arms (e.g., wheelchair, bedside chair)? 3  -PH     Climbing 3-5 steps with a railing? 2  -PH     To walk in hospital room? 3  -PH     AM-PAC 6 Clicks Score (PT) 18  -     Row Name 11/10/21 1317 11/10/21 1210       Functional Assessment    Outcome Measure Options AM-PAC 6 Clicks Daily Activity (OT)  -KP AM-PAC 6 Clicks Basic Mobility (PT)  -          User Key  (r) = Recorded By, (t) = Taken By, (c) = Cosigned By    Initials Name Provider Type     Juli Melgar, OTR Occupational Therapist     Renee Dutta, LILLIANA Physical Therapy Assistant                Occupational Therapy Education                 Title: PT OT SLP Therapies (Done)     Topic: Occupational Therapy (Done)     Point: ADL training (Done)     Description:   Instruct learner(s) on proper safety adaptation and remediation techniques during self care or transfers.   Instruct in proper use of assistive devices.              Learning Progress Summary           Patient Acceptance, E,TB,D, DU,VU by  at 11/10/2021 1317    Comment: ed pt on role of OT. benefit of therapy, POC w. OT. ed on safety w. ADLs and tsf. pt demo w CGA to SBA                   Point: Home exercise program (Done)     Description:   Instruct learner(s) on appropriate technique for monitoring, assisting and/or progressing therapeutic exercises/activities.              Learning Progress Summary            Patient Acceptance, E,TB,D, DU,VU by  at 11/10/2021 1317    Comment: ed pt on role of OT. benefit of therapy, POC w. OT. ed on safety w. ADLs and tsf. pt demo w CGA to SBA                   Point: Precautions (Done)     Description:   Instruct learner(s) on prescribed precautions during self-care and functional transfers.              Learning Progress Summary           Patient Acceptance, E,TB,D, DU,VU by  at 11/10/2021 1317    Comment: ed pt on role of OT. benefit of therapy, POC w. OT. ed on safety w. ADLs and tsf. pt demo w CGA to SBA                   Point: Body mechanics (Done)     Description:   Instruct learner(s) on proper positioning and spine alignment during self-care, functional mobility activities and/or exercises.              Learning Progress Summary           Patient Acceptance, E,TB,D, DU,VU by  at 11/10/2021 1317    Comment: ed pt on role of OT. benefit of therapy, POC w. OT. ed on safety w. ADLs and tsf. pt demo w CGA to SBA                               User Key     Initials Effective Dates Name Provider Type Discipline     06/16/21 -  Juli Melgar, OTR Occupational Therapist OT              OT Recommendation and Plan  Planned Therapy Interventions (OT): activity tolerance training, functional balance retraining, occupation/activity based interventions, BADL retraining, adaptive equipment training, transfer/mobility retraining, strengthening exercise, ROM/therapeutic exercise  Therapy Frequency (OT): 5 times/wk  Plan of Care Review  Plan of Care Reviewed With: patient  Progress: improving  Outcome Summary: pt admitted on 11/1/21 w. B pulmonary embolism. pt was independent PTA and her niece lives with her, she had A from her getting into/out of her tub for safety, and is wanting to get grab bars but would love to turn it into a walk in one day so it will be safer. pt has a reacher at home. pt has difficulty with socks but did prior as well. ed on trying a sock aide to A w  socks and reacher for pants and taking socks off. Pt ed on UE ex to incr strength/endurnace, pt complete AROM 10x2 B UE . pt stood w CGA/SBA. Able to test her strength when she was standing which demo pretty good balance, i would recommend acute rehab or home w HH and A from family. Cont therapy to incr ADL, strength, balance, tsf, and AE training.     Time Calculation:    Time Calculation- OT     Row Name 11/10/21 1318             Time Calculation- OT    OT Start Time 1235  -KP      OT Stop Time 1300  -KP      OT Time Calculation (min) 25 min  -KP      Total Timed Code Minutes- OT 15 minute(s)  -KP      OT Received On 11/10/21  -      OT - Next Appointment 11/11/21  -      OT Goal Re-Cert Due Date 11/24/21  -              Timed Charges    28596 - OT Therapeutic Exercise Minutes 15  -KP              Untimed Charges    OT Eval/Re-eval Minutes 10  -KP              Total Minutes    Timed Charges Total Minutes 15  -KP      Untimed Charges Total Minutes 10  -KP       Total Minutes 25  -KP            User Key  (r) = Recorded By, (t) = Taken By, (c) = Cosigned By    Initials Name Provider Type    Juli Alonzo OTR Occupational Therapist              Therapy Charges for Today     Code Description Service Date Service Provider Modifiers Qty    40018887921 HC OT THER PROC EA 15 MIN 11/10/2021 Juli Melgar OTR GO 1    99497851185 HC OT EVAL LOW COMPLEXITY 2 11/10/2021 Juli Melgar OTR GO 1               MADISON Bradford  11/10/2021

## 2021-11-11 VITALS
BODY MASS INDEX: 45.99 KG/M2 | DIASTOLIC BLOOD PRESSURE: 106 MMHG | WEIGHT: 293 LBS | HEART RATE: 75 BPM | TEMPERATURE: 98.7 F | OXYGEN SATURATION: 100 % | SYSTOLIC BLOOD PRESSURE: 169 MMHG | HEIGHT: 67 IN | RESPIRATION RATE: 18 BRPM

## 2021-11-11 LAB
ANION GAP SERPL CALCULATED.3IONS-SCNC: 6.8 MMOL/L (ref 5–15)
APTT PPP: 81.9 SECONDS (ref 22.7–35.4)
BASOPHILS # BLD AUTO: 0.1 10*3/MM3 (ref 0–0.2)
BASOPHILS NFR BLD AUTO: 1 % (ref 0–1.5)
BUN SERPL-MCNC: 22 MG/DL (ref 8–23)
BUN/CREAT SERPL: 14.7 (ref 7–25)
CALCIUM SPEC-SCNC: 9 MG/DL (ref 8.6–10.5)
CHLORIDE SERPL-SCNC: 96 MMOL/L (ref 98–107)
CO2 SERPL-SCNC: 25.2 MMOL/L (ref 22–29)
CREAT SERPL-MCNC: 1.5 MG/DL (ref 0.57–1)
DEPRECATED RDW RBC AUTO: 40.6 FL (ref 37–54)
EOSINOPHIL # BLD AUTO: 0.57 10*3/MM3 (ref 0–0.4)
EOSINOPHIL NFR BLD AUTO: 5.6 % (ref 0.3–6.2)
ERYTHROCYTE [DISTWIDTH] IN BLOOD BY AUTOMATED COUNT: 13.1 % (ref 12.3–15.4)
GFR SERPL CREATININE-BSD FRML MDRD: 42 ML/MIN/1.73
GLUCOSE BLDC GLUCOMTR-MCNC: 132 MG/DL (ref 70–130)
GLUCOSE BLDC GLUCOMTR-MCNC: 198 MG/DL (ref 70–130)
GLUCOSE SERPL-MCNC: 174 MG/DL (ref 65–99)
HCT VFR BLD AUTO: 29.8 % (ref 34–46.6)
HGB BLD-MCNC: 9.6 G/DL (ref 12–15.9)
IMM GRANULOCYTES # BLD AUTO: 0.11 10*3/MM3 (ref 0–0.05)
IMM GRANULOCYTES NFR BLD AUTO: 1.1 % (ref 0–0.5)
INR PPP: 1.98 (ref 0.9–1.1)
LYMPHOCYTES # BLD AUTO: 3.4 10*3/MM3 (ref 0.7–3.1)
LYMPHOCYTES NFR BLD AUTO: 33.4 % (ref 19.6–45.3)
MCH RBC QN AUTO: 27.9 PG (ref 26.6–33)
MCHC RBC AUTO-ENTMCNC: 32.2 G/DL (ref 31.5–35.7)
MCV RBC AUTO: 86.6 FL (ref 79–97)
MONOCYTES # BLD AUTO: 0.85 10*3/MM3 (ref 0.1–0.9)
MONOCYTES NFR BLD AUTO: 8.3 % (ref 5–12)
NEUTROPHILS NFR BLD AUTO: 5.15 10*3/MM3 (ref 1.7–7)
NEUTROPHILS NFR BLD AUTO: 50.6 % (ref 42.7–76)
NRBC BLD AUTO-RTO: 0.2 /100 WBC (ref 0–0.2)
PLATELET # BLD AUTO: 278 10*3/MM3 (ref 140–450)
PMV BLD AUTO: 11.1 FL (ref 6–12)
POTASSIUM SERPL-SCNC: 5.4 MMOL/L (ref 3.5–5.2)
PROTHROMBIN TIME: 22.2 SECONDS (ref 11.7–14.2)
RBC # BLD AUTO: 3.44 10*6/MM3 (ref 3.77–5.28)
SODIUM SERPL-SCNC: 128 MMOL/L (ref 136–145)
WBC # BLD AUTO: 10.18 10*3/MM3 (ref 3.4–10.8)

## 2021-11-11 PROCEDURE — 63710000001 INSULIN LISPRO (HUMAN) PER 5 UNITS: Performed by: INTERNAL MEDICINE

## 2021-11-11 PROCEDURE — 82962 GLUCOSE BLOOD TEST: CPT

## 2021-11-11 PROCEDURE — 85025 COMPLETE CBC W/AUTO DIFF WBC: CPT | Performed by: INTERNAL MEDICINE

## 2021-11-11 PROCEDURE — 80048 BASIC METABOLIC PNL TOTAL CA: CPT | Performed by: INTERNAL MEDICINE

## 2021-11-11 PROCEDURE — 85730 THROMBOPLASTIN TIME PARTIAL: CPT | Performed by: INTERNAL MEDICINE

## 2021-11-11 PROCEDURE — 63710000001 INSULIN ISOPHANE & REGULAR PER 5 UNITS: Performed by: INTERNAL MEDICINE

## 2021-11-11 PROCEDURE — 85610 PROTHROMBIN TIME: CPT | Performed by: INTERNAL MEDICINE

## 2021-11-11 RX ORDER — HYDROCODONE BITARTRATE AND ACETAMINOPHEN 5; 325 MG/1; MG/1
1 TABLET ORAL EVERY 12 HOURS PRN
Qty: 6 TABLET | Refills: 0 | Status: SHIPPED | OUTPATIENT
Start: 2021-11-11 | End: 2022-02-02

## 2021-11-11 RX ADMIN — INSULIN LISPRO 5 UNITS: 100 INJECTION, SOLUTION INTRAVENOUS; SUBCUTANEOUS at 11:55

## 2021-11-11 RX ADMIN — Medication 2000 UNITS: at 08:20

## 2021-11-11 RX ADMIN — HYDROCODONE BITARTRATE AND ACETAMINOPHEN 1 TABLET: 5; 325 TABLET ORAL at 05:19

## 2021-11-11 RX ADMIN — INSULIN HUMAN 50 UNITS: 100 INJECTION, SUSPENSION SUBCUTANEOUS at 08:20

## 2021-11-11 RX ADMIN — URSODIOL 300 MG: 300 CAPSULE ORAL at 08:20

## 2021-11-11 RX ADMIN — LISINOPRIL 10 MG: 10 TABLET ORAL at 08:20

## 2021-11-11 RX ADMIN — GABAPENTIN 300 MG: 300 CAPSULE ORAL at 08:20

## 2021-11-11 RX ADMIN — FUROSEMIDE 20 MG: 20 TABLET ORAL at 08:20

## 2021-11-11 RX ADMIN — INSULIN LISPRO 3 UNITS: 100 INJECTION, SOLUTION INTRAVENOUS; SUBCUTANEOUS at 11:55

## 2021-11-11 NOTE — CASE MANAGEMENT/SOCIAL WORK
Continued Stay Note  [facility]     Patient Name: Grace Renee  MRN: [id number]  Today's Date: 11/11/2021    Admit Date: 11/1/2021     Discharge Plan     Row Name 11/11/21 0937       Plan    [facility] scheduled today 11/11 at 2:30PM    Patient/Family in Agreement with Plan yes    Plan Comments CCP left [facility] for [facility] to see if they transport with oxygen. CCP called [facility] to see if they have any transport available. [facility] has no transport available today. CCP scheduled [facility] today at 2:30PM to [facility] in [place], [place]. [facility] notified [facility] of transport time. [facility] stated there was no further requirements for admission. RN notified of transport time. CCP to follow for any further needs that arise pending d/c. MAYTE Villegas               Discharge Codes    No documentation.               Expected Discharge Date and Time     Expected Discharge Date Expected Discharge Time    Nov 11, 2021             MAYTE OLIVER

## 2021-11-11 NOTE — PLAN OF CARE
Goal Outcome Evaluation:  Plan of Care Reviewed With: patient        Progress: improving  Outcome Summary: VSS, on 2L O2, PRN painmeds given, possible D/C if INR at desired level, no nausea, will CTM

## 2021-11-11 NOTE — CASE MANAGEMENT/SOCIAL WORK
"Physicians Statement of Medical Necessity for  Ambulance Transportation    GENERAL INFORMATION     Name: Grace Renee  YOB: 1950  Medicare #: [id number]  Transport Date: 11/11/2021 (Valid for round trips this date, or for scheduled repetitive trips for 60 days from the date signed below.)  Origin: [place] [address]  Destination: [facility] [address] [place] [phone]  Is the Patient's stay covered under Medicare Part A (PPS/DRG?)Yes  Closest appropriate facility? Yes  If this a hosp-hosp transfer? No  Is this a hospice patient? No    MEDICAL NECESSITY QUESTIONAIRE    Ambulance Transportation is medically necessary only if other means of transportation are contraindicated or would be potentially harmful to the patient.  To meet this requirement, the patient must be either \"bed confined\" or suffer from a condition such that transport by means other than an ambulance is contraindicated by the patient's condition.  The following questions must be answered by the healthcare professional signing below for this form to be valid:     1) Describe the MEDICAL CONDITION (physical and/or mental) of this patient AT THE TIME OF AMBULANCE TRANSPORT that requires the patient to be transported in an ambulance, and why transport by other means is contraindicated by the patient's condition:     Falls risk, new oxygen, dizziness, assist of 1    Active Hospital Problems     Diagnosis   POA   • **Bilateral pulmonary embolism (HCC) [I26.99]   Yes   • Osteoarthritis of left hip [M16.12]   Yes   • Anemia [D64.9]   No   • Acute respiratory failure with hypoxia (HCC) [J96.01]   Yes   • Sleep apnea [G47.30]   Yes   • Hypertension [I10]   Yes   • GERD (gastroesophageal reflux disease) [K21.9]   Yes   • Elevated cholesterol [E78.00]   Yes   • Type 2 diabetes mellitus, with long-term current use of insulin (HCC) [E11.9, Z79.4]   Not Applicable   • DINAH (acute kidney injury) (HCC) [N17.9]         Past " "Medical History:   Diagnosis Date   • Diabetes mellitus (HCC)    • Elevated cholesterol    • GERD (gastroesophageal reflux disease)    • Hypertension    • Osteoarthritis of left hip 11/6/2021   • Sleep apnea       Past Surgical History:   Procedure Laterality Date   • CARDIAC CATHETERIZATION Bilateral 11/2/2021    Procedure: Percutaneous Mechanical Thrombectomy- INARI;  Surgeon: Chris Pritchett MD;  Location:  [facility];  Service: Cardiovascular;  Laterality: Bilateral;   • CARDIAC CATHETERIZATION N/A 11/2/2021    Procedure: Right Heart Cath;  Surgeon: Chris Pritchett MD;  Location:  [facility];  Service: Cardiovascular;  Laterality: N/A;   • INTERVENTIONAL RADIOLOGY PROCEDURE Bilateral 11/2/2021    Procedure: Pulmonary Angiogram;  Surgeon: Chris Pritchett MD;  Location:  [facility];  Service: Cardiovascular;  Laterality: Bilateral;      2) Is this patient \"bed confined\" as defined below?No    To be \"bed confined\" the patient must satisfy all three of the following criteria:  (1) unable to get up from bed without assistance; AND (2) unable to ambulate;  AND (3) unable to sit in a chair or wheelchair.  3) Can this patient safely be transported by car or wheelchair van (I.e., may safely sit during transport, without an attendant or monitoring?)Yes  4. In addition to completing questions 1-3 above, please check any of the following conditions that apply*:          *Note: supporting documentation for any boxes checked must be maintained in the patient's medical records Moderate/severe pain on movement, Medical attendant required, Requires oxygen - unable to self administer, Morbid obesity requires additional personnel/equipment to safely handle patient and Other No wheelchair vans avaialble until weekend      SIGNATURE OF PHYSICIAN OR OTHER AUTHORIZED HEALTHCARE PROFESSIONAL    I certify that the above information is true and correct based on my evaluation " of this patient, and represent that the patient requires transport by ambulance and that other forms of transport are contraindicated.  I understand that this information will be used by the Centers for Medicare and Medicaid Services (CMS) to support the determiniation of medical necessity for ambulance services, and I represent that I have personal knowledge of the patient's condition at the time of transport.       If this box is checked, I also certify that the patient is physically or mentally incapable of signing the ambulance service's claim form and that the institution with which I am affiliated has furnished care, services or assistance to the patient.  My signature below is made on behalf of the patient pursuant to 42 .36(b)(4). In accordance with 42 .37, the specific reason(s) that the patient is physically or mentally incapable of signing the claim for is as follows:     Signature of Physician or Healthcare Professional  Date/Time:        (For Scheduled repetitive transport, this form is not valid for transports performed more than 60 days after this date).                                                                                                                                            --------------------------------------------------------------------------------------------  Printed Name and Credentials of Physician or Authorized Healthcare Professional     *Form must be signed by patient's attending physician for scheduled, repetitive transports,.  For non-repetitive ambulance transports, if unable to obtain the signature of the attending physician, any of the following may sign (please select below):     Physician  Clinical Nurse Specialist  Registered Nurse     Physician Assistant  Discharge Planner  Licensed Practical Nurse     Nurse Practitioner

## 2021-12-15 ENCOUNTER — RESEARCH ENCOUNTER (OUTPATIENT)
Dept: CARDIOLOGY | Facility: CLINIC | Age: 71
End: 2021-12-15

## 2021-12-15 NOTE — RESEARCH
Research study: [facility]  Subject study ID#: [id number]  Subject initials: OUSMANE ROMEO presented to [facility] on 11/1/21 after c/o SOA and having a syncopal episode at a local [employer] tomas. She was diagnosed with pulmonary embolism and treated with pulmonary thrombectomy procedure in the cath lab on 11/2/21 by Dr Pritchett. She enrolled in [facility] during her hospital stay.     OUSMANE was supposed to come to the [facility] office today (12/15/21) to have an echocardiogram, see Dr Pritchett, and meet with a research coordinator such as myself to complete the 1-month visit questionnaires and 6-minute walk test. She missed these appointments.     I called her by telephone this afternoon. OUSMANE said she was unaware of today's [missed] appointments at [facility]. She described a number of unfortunate tragedies and deaths that have affected her sisters and nieces as well as 3 COVID deaths among her neighbors. I gave her the [facility] office number and she said she would call tomorrow to reschedule.     OUSMANE said that after her hospital stay she spent about 5 days at the rehab nursing home. She said they took 45 minutes to answer her calls for assistance, she didn't receive much physical therapy, and she didn't sleep well. In short, she was dissatisfied and left ahead of schedule. She left and stayed with one of her sisters for several days, but returned to her own home about a week ago.  She is still using a walker mainly due to leg discomfort. A home health nurse and therapist check on her 2-3 times a week.     Although she does not experience much shortness of breath except for when she is getting her walker out of the car, she mentioned that she is not very mobile either due to leg pain that continues to plague her. She asks younger family members to bring her groceries. She said she has started driving again and [facility]. She has had no bleeding events, no other hospitalizations and so there are no  adverse events to report. We completed the study questionnaires over the phone.     We will continue to follow OUSMANE per study protocol.     Chelsea CHRISTOPHER RN  Clinical Research Coordinator

## 2022-02-02 ENCOUNTER — RESEARCH ENCOUNTER (OUTPATIENT)
Dept: CARDIOLOGY | Facility: CLINIC | Age: 72
End: 2022-02-02

## 2022-02-02 ENCOUNTER — OFFICE VISIT (OUTPATIENT)
Dept: CARDIOLOGY | Facility: CLINIC | Age: 72
End: 2022-02-02

## 2022-02-02 ENCOUNTER — HOSPITAL ENCOUNTER (OUTPATIENT)
Dept: CARDIOLOGY | Facility: HOSPITAL | Age: 72
Discharge: HOME | End: 2022-02-02
Admitting: INTERNAL MEDICINE

## 2022-02-02 VITALS
HEART RATE: 75 BPM | SYSTOLIC BLOOD PRESSURE: 156 MMHG | HEIGHT: 67 IN | BODY MASS INDEX: 45.99 KG/M2 | DIASTOLIC BLOOD PRESSURE: 76 MMHG | WEIGHT: 293 LBS

## 2022-02-02 VITALS
HEIGHT: 67 IN | SYSTOLIC BLOOD PRESSURE: 168 MMHG | DIASTOLIC BLOOD PRESSURE: 68 MMHG | BODY MASS INDEX: 48.71 KG/M2 | OXYGEN SATURATION: 99 % | HEART RATE: 74 BPM

## 2022-02-02 DIAGNOSIS — I26.99 BILATERAL PULMONARY EMBOLISM: ICD-10-CM

## 2022-02-02 DIAGNOSIS — I10 PRIMARY HYPERTENSION: ICD-10-CM

## 2022-02-02 DIAGNOSIS — E66.01 OBESITY, MORBID: ICD-10-CM

## 2022-02-02 DIAGNOSIS — I26.99 BILATERAL PULMONARY EMBOLISM: Primary | ICD-10-CM

## 2022-02-02 PROCEDURE — 93000 ELECTROCARDIOGRAM COMPLETE: CPT | Performed by: INTERNAL MEDICINE

## 2022-02-02 PROCEDURE — 99214 OFFICE O/P EST MOD 30 MIN: CPT | Performed by: INTERNAL MEDICINE

## 2022-02-02 PROCEDURE — 93306 TTE W/DOPPLER COMPLETE: CPT | Performed by: INTERNAL MEDICINE

## 2022-02-02 PROCEDURE — 25010000002 PERFLUTREN (DEFINITY) 8.476 MG IN SODIUM CHLORIDE (PF) 0.9 % 10 ML INJECTION: Performed by: INTERNAL MEDICINE

## 2022-02-02 PROCEDURE — 93306 TTE W/DOPPLER COMPLETE: CPT

## 2022-02-02 RX ORDER — LISINOPRIL 5 MG/1
5 TABLET ORAL 2 TIMES DAILY
Qty: 180 TABLET | Refills: 3 | Status: SHIPPED | OUTPATIENT
Start: 2022-02-02

## 2022-02-02 RX ORDER — OMEPRAZOLE 40 MG/1
40 CAPSULE, DELAYED RELEASE ORAL DAILY
COMMUNITY
Start: 2022-01-01

## 2022-02-02 RX ADMIN — PERFLUTREN 1.5 ML: 6.52 INJECTION, SUSPENSION INTRAVENOUS at 13:51

## 2022-02-02 NOTE — RESEARCH
[facility]  Subject ID: [id number]  Subject initials: OUSMANE ROMEO came to the [facility] clinic today (Feb 2, 2022) to have an echocardiogram (paid for by the study) and to see Dr Pritchett. He had performed thrombectomy in November 2021 after OUSMANE was diagnosed with acute pulmonary embolism but OUSMANE has been unable to come into the clinic for follow up until today.      I saw OUSMANE in between these encounters today to complete the 6-month study visit requirements, including vitals, recent medical history, and questionnaires. OUSMANE was unable to complete the 6-minute walk test due to chronic left hip and leg pain, and so I filed a protocol deviation for that missing data. This concludes OUSMANE's active participation in the study. I thanked her for her time and contribution to the study.     Chelsea CHRISTOPHER RN  Clinical Research Coordinator

## 2022-02-02 NOTE — PROGRESS NOTES
[facility] Follow Up Office Note     Encounter Date:22  Patient:Grace Renee  :1950  MRN:[id number]      Chief Complaint:   Chief Complaint   Patient presents with   • 1 month BHE f/u       History of Presenting Illness:      Ms. Renee is a 71 y.o. woman with past medical history notable for bilateral pulmonary emboli, morbid obesity and chronic low back pain who presents to our office for scheduled follow-up regarding her pulmonary emboli as part of the FLASH registry.  Since leaving the hospital she has done reasonably well.  She does have a lot of deconditioning related to orthopedic issues and does use a Rollator to get around.  Overall her breathing is improved compared to her prehospitalization status but still fairly deconditioned.  Her blood pressure is elevated today however a few days ago she actually had a blood pressure that was fairly low in the 70s systolics.  She did move her feet around and lay down for a little bit with her blood pressure recovering to the 120s systolic.  Over the last 2 days she had not taken any of her blood pressure medicine in order to avoid any low blood pressures.  Her primary care physician asked her to cut her lisinopril in half but she just has been holding it altogether.  She did have her knee injected which is helped out a little bit and she is also getting her hip injected tomorrow.    Review of Systems:  Review of Systems   Constitutional: Positive for malaise/fatigue.   HENT: Negative.    Eyes: Negative.    Cardiovascular: Positive for dyspnea on exertion and leg swelling.   Respiratory: Positive for shortness of breath.    Endocrine: Negative.    Hematologic/Lymphatic: Negative.    Skin: Negative.    Musculoskeletal: Positive for joint pain.   Gastrointestinal: Negative.    Genitourinary: Negative.    Neurological: Negative.    Psychiatric/Behavioral: Negative.    Allergic/Immunologic: Negative.        Current Outpatient Medications on  File Prior to Visit   Medication Sig Dispense Refill   • allopurinol (ZYLOPRIM) 100 MG tablet Take 100 mg by mouth Every Night.     • amLODIPine (NORVASC) 10 MG tablet Take 10 mg by mouth every night at bedtime.     • calcium carbonate (TUMS) 500 MG chewable tablet Chew 1 tablet 3 (Three) Times a Day As Needed for Indigestion or Heartburn. 90 tablet 3   • cholecalciferol (VITAMIN D3) 25 MCG (1000 UT) tablet Take 2,000 Units by mouth Daily.     • furosemide (LASIX) 20 MG tablet Take 1 tablet by mouth Daily. 30 tablet 3   • gabapentin (NEURONTIN) 300 MG capsule Take 1 capsule by mouth 2 (Two) Times a Day. 12 capsule 0   • insulin NPH-insulin regular (humuLIN 70/30,novoLIN 70/30) (70-30) 100 UNIT/ML injection Inject 50 Units under the skin into the appropriate area as directed Every Morning.     • insulin NPH-insulin regular (humuLIN 70/30,novoLIN 70/30) (70-30) 100 UNIT/ML injection Inject 30 Units under the skin into the appropriate area as directed Every Night.     • omeprazole (priLOSEC) 40 MG capsule Take 40 mg by mouth Daily.     • ursodiol (ACTIGALL) 300 MG capsule Take 500 mg by mouth 2 (Two) Times a Day.     • warfarin (COUMADIN) 7.5 MG tablet 1 tablet every day  Indications: DVT/PE (active thrombosis) 30 tablet 3   • [DISCONTINUED] lisinopril (PRINIVIL,ZESTRIL) 10 MG tablet Take 1 tablet by mouth Daily. 30 tablet 3   • [DISCONTINUED] HYDROcodone-acetaminophen (NORCO) 5-325 MG per tablet Take 1 tablet by mouth Every 12 (Twelve) Hours As Needed for Severe Pain . 6 tablet 0   • [DISCONTINUED] insulin lispro (ADMELOG) 100 UNIT/ML injection Inject 0-14 Units under the skin into the appropriate area as directed 3 (Three) Times a Day Before Meals. 60 mL 3   • [DISCONTINUED] insulin lispro (ADMELOG) 100 UNIT/ML injection Inject 5 Units under the skin into the appropriate area as directed 3 (Three) Times a Day With Meals. 60 mL 3   • [DISCONTINUED] ondansetron (ZOFRAN) 4 MG tablet Take 1 tablet by mouth Every 6 (Six)  Hours As Needed for Nausea or Vomiting. 90 tablet 0   • [DISCONTINUED] pantoprazole (PROTONIX) 40 MG EC tablet Take 1 tablet by mouth 2 (Two) Times a Day Before Meals. 60 tablet 3     Current Facility-Administered Medications on File Prior to Visit   Medication Dose Route Frequency Provider Last Rate Last Admin   • [COMPLETED] perflutren (DEFINITY) 8.476 mg in Sodium Chloride (PF) 0.9 % 10 mL injection  1.5 mL Intravenous Once in imaging Chris Pritchett MD   1.5 mL at 02/02/22 1351       No Known Allergies    Past Medical History:   Diagnosis Date   • Diabetes mellitus (HCC)    • Elevated cholesterol    • GERD (gastroesophageal reflux disease)    • Hypertension    • Osteoarthritis of left hip 11/6/2021   • Sleep apnea        Past Surgical History:   Procedure Laterality Date   • CARDIAC CATHETERIZATION Bilateral 11/2/2021    Procedure: Percutaneous Mechanical Thrombectomy- INARI;  Surgeon: Chris Pritchett MD;  Location: [facility] CATH INVASIVE LOCATION;  Service: Cardiovascular;  Laterality: Bilateral;   • CARDIAC CATHETERIZATION N/A 11/2/2021    Procedure: Right Heart Cath;  Surgeon: Chris Pritchett MD;  Location:  [facility] CATH INVASIVE LOCATION;  Service: Cardiovascular;  Laterality: N/A;   • INTERVENTIONAL RADIOLOGY PROCEDURE Bilateral 11/2/2021    Procedure: Pulmonary Angiogram;  Surgeon: Chris Pritchett MD;  Location:  [facility] CATH INVASIVE LOCATION;  Service: Cardiovascular;  Laterality: Bilateral;       Social History     Socioeconomic History   • Marital status:    Tobacco Use   • Smoking status: Never Smoker   • Smokeless tobacco: Never Used   Substance and Sexual Activity   • Alcohol use: Not Currently   • Drug use: Never   • Sexual activity: Not Currently       History reviewed. No pertinent family history.    The following portions of the patient's history were reviewed and updated as appropriate: allergies, current medications, past family history, past medical history, past social history,  "past surgical history and problem list.       Objective:       Vitals:    02/02/22 1429   BP: 168/68   BP Location: Left arm   Patient Position: Sitting   Pulse: 74   SpO2: 99%   Height: 170.2 cm (67\")     Body mass index is 48.71 kg/m².     Physical Exam:  Constitutional: Well appearing, well developed, no acute distress   HENT: Oropharynx clear and membrane moist  Eyes: Normal conjunctiva, no sclera icterus.  Neck: Supple, no carotid bruit bilaterally.  Cardiovascular: Regular rate and rhythm, No Murmur, 1+ bilateral lower extremity edema.  Pulmonary: Normal respiratory effort, normal lung sounds, no wheezing.  Abdominal: Soft, nontender, no hepatosplenomegaly, liver is non-pulsatile.  Neurological: Alert and orient x 3.   Skin: Warm, dry, no ecchymosis, no rash.  Psych: Appropriate mood and affect. Normal judgment and insight.         Lab Results   Component Value Date     (L) 11/11/2021     11/10/2021    K 5.4 (H) 11/11/2021    K 4.7 11/10/2021    CL 96 (L) 11/11/2021     11/10/2021    CO2 25.2 11/11/2021    CO2 25.6 11/10/2021    BUN 22 11/11/2021    BUN 19 11/10/2021    CREATININE 1.50 (H) 11/11/2021    CREATININE 1.28 (H) 11/10/2021    EGFRIFAFRI 42 (L) 11/11/2021    EGFRIFAFRI 50 (L) 11/10/2021    GLUCOSE 174 (H) 11/11/2021    GLUCOSE 197 (H) 11/10/2021    CALCIUM 9.0 11/11/2021    CALCIUM 9.3 11/10/2021    ALBUMIN 3.40 (L) 11/06/2021    ALBUMIN 3.90 11/02/2021    BILITOT 0.3 11/06/2021    BILITOT 0.3 11/02/2021    AST 27 11/06/2021    AST 41 (H) 11/02/2021    ALT 24 11/06/2021    ALT 33 11/02/2021     Lab Results   Component Value Date    WBC 10.18 11/11/2021    WBC 10.04 11/10/2021    HGB 9.6 (L) 11/11/2021    HGB 9.7 (L) 11/10/2021    HCT 29.8 (L) 11/11/2021    HCT 29.5 (L) 11/10/2021    MCV 86.6 11/11/2021    MCV 87.0 11/10/2021     11/11/2021     11/10/2021     No results found for: CHOL, TRIG, HDL, LDL  Lab Results   Component Value Date    PROBNP 99.8 11/01/2021     Lab " Results   Component Value Date    TROPONINT 0.249 (C) 11/02/2021     No results found for: TSH        ECG 12 Lead    Date/Time: 2/2/2022 3:51 PM  Performed by: Chris Pritchett MD  Authorized by: Chris Pritchett MD   Comparison: compared with previous ECG from 11/1/2021  Rhythm: sinus rhythm        Echocardiogram 2/2/2022 with images reviewed by myself:  · Relatively preserved left ventricular function  · Right ventricle may be mildly dilated but preserved function.  · Estimated right ventricular systolic pressures less than 35 mmHg    Echocardiogram 11/2/2021:  · Left ventricular wall thickness is consistent with moderate concentric hypertrophy.  · Estimated right ventricular systolic pressure from tricuspid regurgitation is normal (<35 mmHg).  · Left ventricular diastolic function is consistent with (grade I) impaired relaxation.  · Calculated left ventricular EF = 84.7% Estimated left ventricular EF was in agreement with the calculated left ventricular EF. Left ventricular systolic function is hyperdynamic (EF > 70%).     Right heart catheterization with pulmonary angiography and thrombectomy 11/2/2021:  · Severe pulmonary hypertension with mean pulmonary artery pressures of 42 mmHg with decompensated state with elevated right atrial pressure of 27 mmHg.  · Reduced cardiac output and index of 3.8 L/min and 6.4 L/min/m2  · Aspiration and thrombectomy performed in right and left pulmonary arteries.  Successful removal of grade 2 clot score from right pulmonary artery and a grade 1 clot score from the left.  · Unchanged hemodynamics with continued severe pulmonary pretension with a mean PA 42 mmHg with elevated right atrial pressures and moderately reduced cardiac output and index of 4.1 L/min and 1.8 L/min/m2             Assessment:          Diagnosis Plan   1. Bilateral pulmonary embolism (HCC)  ECG 12 Lead   2. Primary hypertension     3. Obesity, morbid (HCC)            Plan:       Ms. Renee is a 71 y.o.  woman with past medical history notable for bilateral pulmonary emboli, morbid obesity and chronic low back pain who presents to our office for scheduled follow-up regarding her pulmonary emboli as part of the FLASH registry.  Overall she is doing reasonably well.  She has not had a whole lot of improvement in her overall symptoms but likely this is related to her other comorbidities including her morbid obesity and orthopedic issues.  I would continue with anticoagulation lifelong for her and would recommend trying to divide up her lisinopril to see if this will help reduce the amount of labile nature of her blood pressure.  Otherwise no other testing is needed this time.  Her follow-up echocardiogram today actually shows relatively preserved right ventricular size and function although may be a little mildly dilated on my estimation       Pulmonary emboli with cor pulmonale:  · Continue anticoagulation with therapeutic Coumadin with INR greater than 2  · Presentation more likely acute on chronic and residual clot burden likely represents chronic clot based upon angiographic appearance and the type of clot removed  · Follow-up echocardiogram today demonstrates relatively preserved biventricular size and systolic function with no significant elevation of pulmonary pressures.     Hypertension:  · Continue with current medical regimen but would like to divide up lisinopril dosing to avoid significant low blood pressures.    Morbid obesity:  · Body mass index is 48.71 kg/m².   · BMI and total weight in a range would recommend Coumadin  · Recommended continued increasing activity level and weight loss to help with management of hip pain as well as prevent future clotting events    Follow-up:  6 months        Chris Pritchett MD  [facility]  02/02/22  15:54 EST

## 2022-02-03 LAB
AORTIC ARCH: 2.7 CM
ASCENDING AORTA: 2.1 CM
BH CV ECHO MEAS - ACS: 2 CM
BH CV ECHO MEAS - AO MAX PG (FULL): 6 MMHG
BH CV ECHO MEAS - AO MAX PG: 13.8 MMHG
BH CV ECHO MEAS - AO MEAN PG (FULL): 3.6 MMHG
BH CV ECHO MEAS - AO MEAN PG: 7.7 MMHG
BH CV ECHO MEAS - AO ROOT AREA (BSA CORRECTED): 1.2
BH CV ECHO MEAS - AO ROOT AREA: 6.8 CM^2
BH CV ECHO MEAS - AO ROOT DIAM: 2.9 CM
BH CV ECHO MEAS - AO V2 MAX: 186 CM/SEC
BH CV ECHO MEAS - AO V2 MEAN: 130.4 CM/SEC
BH CV ECHO MEAS - AO V2 VTI: 46.9 CM
BH CV ECHO MEAS - ASC AORTA: 2.1 CM
BH CV ECHO MEAS - AVA(I,A): 2.2 CM^2
BH CV ECHO MEAS - AVA(I,D): 2.2 CM^2
BH CV ECHO MEAS - AVA(V,A): 2.1 CM^2
BH CV ECHO MEAS - AVA(V,D): 2.1 CM^2
BH CV ECHO MEAS - BSA(HAYCOCK): 2.7 M^2
BH CV ECHO MEAS - BSA: 2.4 M^2
BH CV ECHO MEAS - BZI_BMI: 48.7 KILOGRAMS/M^2
BH CV ECHO MEAS - BZI_METRIC_HEIGHT: 170.2 CM
BH CV ECHO MEAS - BZI_METRIC_WEIGHT: 141.1 KG
BH CV ECHO MEAS - EDV(CUBED): 62.3 ML
BH CV ECHO MEAS - EDV(MOD-SP2): 94 ML
BH CV ECHO MEAS - EDV(MOD-SP4): 91 ML
BH CV ECHO MEAS - EDV(TEICH): 68.5 ML
BH CV ECHO MEAS - EF(CUBED): 63.2 %
BH CV ECHO MEAS - EF(MOD-BP): 65.5 %
BH CV ECHO MEAS - EF(MOD-SP2): 67 %
BH CV ECHO MEAS - EF(MOD-SP4): 65.9 %
BH CV ECHO MEAS - EF(TEICH): 55.3 %
BH CV ECHO MEAS - ESV(CUBED): 22.9 ML
BH CV ECHO MEAS - ESV(MOD-SP2): 31 ML
BH CV ECHO MEAS - ESV(MOD-SP4): 31 ML
BH CV ECHO MEAS - ESV(TEICH): 30.6 ML
BH CV ECHO MEAS - FS: 28.3 %
BH CV ECHO MEAS - IVS/LVPW: 1.1
BH CV ECHO MEAS - IVSD: 1.2 CM
BH CV ECHO MEAS - LAT PEAK E' VEL: 7.3 CM/SEC
BH CV ECHO MEAS - LV DIASTOLIC VOL/BSA (35-75): 37.3 ML/M^2
BH CV ECHO MEAS - LV MASS(C)D: 145.5 GRAMS
BH CV ECHO MEAS - LV MASS(C)DI: 59.6 GRAMS/M^2
BH CV ECHO MEAS - LV MAX PG: 7.8 MMHG
BH CV ECHO MEAS - LV MEAN PG: 4.2 MMHG
BH CV ECHO MEAS - LV SYSTOLIC VOL/BSA (12-30): 12.7 ML/M^2
BH CV ECHO MEAS - LV V1 MAX: 139.6 CM/SEC
BH CV ECHO MEAS - LV V1 MEAN: 95.8 CM/SEC
BH CV ECHO MEAS - LV V1 VTI: 37.4 CM
BH CV ECHO MEAS - LVIDD: 4 CM
BH CV ECHO MEAS - LVIDS: 2.8 CM
BH CV ECHO MEAS - LVLD AP2: 8.2 CM
BH CV ECHO MEAS - LVLD AP4: 8.1 CM
BH CV ECHO MEAS - LVLS AP2: 6.6 CM
BH CV ECHO MEAS - LVLS AP4: 6.8 CM
BH CV ECHO MEAS - LVOT AREA (M): 2.8 CM^2
BH CV ECHO MEAS - LVOT AREA: 2.8 CM^2
BH CV ECHO MEAS - LVOT DIAM: 1.9 CM
BH CV ECHO MEAS - LVPWD: 1.1 CM
BH CV ECHO MEAS - MED PEAK E' VEL: 7.4 CM/SEC
BH CV ECHO MEAS - MR MAX PG: 54.8 MMHG
BH CV ECHO MEAS - MR MAX VEL: 370.2 CM/SEC
BH CV ECHO MEAS - MV A DUR: 0.12 SEC
BH CV ECHO MEAS - MV A MAX VEL: 105.4 CM/SEC
BH CV ECHO MEAS - MV DEC SLOPE: 466.9 CM/SEC^2
BH CV ECHO MEAS - MV DEC TIME: 0.2 SEC
BH CV ECHO MEAS - MV E MAX VEL: 105 CM/SEC
BH CV ECHO MEAS - MV E/A: 1
BH CV ECHO MEAS - MV MAX PG: 8.1 MMHG
BH CV ECHO MEAS - MV MEAN PG: 3.6 MMHG
BH CV ECHO MEAS - MV P1/2T MAX VEL: 148 CM/SEC
BH CV ECHO MEAS - MV P1/2T: 92.8 MSEC
BH CV ECHO MEAS - MV V2 MAX: 142.5 CM/SEC
BH CV ECHO MEAS - MV V2 MEAN: 89.4 CM/SEC
BH CV ECHO MEAS - MV V2 VTI: 38.7 CM
BH CV ECHO MEAS - MVA P1/2T LCG: 1.5 CM^2
BH CV ECHO MEAS - MVA(P1/2T): 2.4 CM^2
BH CV ECHO MEAS - MVA(VTI): 2.7 CM^2
BH CV ECHO MEAS - PA ACC TIME: 0.1 SEC
BH CV ECHO MEAS - PA MAX PG (FULL): 2.1 MMHG
BH CV ECHO MEAS - PA MAX PG: 4 MMHG
BH CV ECHO MEAS - PA PR(ACCEL): 32.7 MMHG
BH CV ECHO MEAS - PA V2 MAX: 100.4 CM/SEC
BH CV ECHO MEAS - PULM A REVS DUR: 0.13 SEC
BH CV ECHO MEAS - PULM A REVS VEL: 26.1 CM/SEC
BH CV ECHO MEAS - PULM DIAS VEL: 38.4 CM/SEC
BH CV ECHO MEAS - PULM S/D: 1.4
BH CV ECHO MEAS - PULM SYS VEL: 54.7 CM/SEC
BH CV ECHO MEAS - PVA(V,A): 2.5 CM^2
BH CV ECHO MEAS - PVA(V,D): 2.5 CM^2
BH CV ECHO MEAS - QP/QS: 0.62
BH CV ECHO MEAS - RAP SYSTOLE: 8 MMHG
BH CV ECHO MEAS - RV MAX PG: 1.9 MMHG
BH CV ECHO MEAS - RV MEAN PG: 1 MMHG
BH CV ECHO MEAS - RV V1 MAX: 69 CM/SEC
BH CV ECHO MEAS - RV V1 MEAN: 46.3 CM/SEC
BH CV ECHO MEAS - RV V1 VTI: 17.3 CM
BH CV ECHO MEAS - RVOT AREA: 3.7 CM^2
BH CV ECHO MEAS - RVOT DIAM: 2.2 CM
BH CV ECHO MEAS - RVSP: 31 MMHG
BH CV ECHO MEAS - SI(AO): 129.7 ML/M^2
BH CV ECHO MEAS - SI(CUBED): 16.1 ML/M^2
BH CV ECHO MEAS - SI(LVOT): 42.6 ML/M^2
BH CV ECHO MEAS - SI(MOD-SP2): 25.8 ML/M^2
BH CV ECHO MEAS - SI(MOD-SP4): 24.6 ML/M^2
BH CV ECHO MEAS - SI(TEICH): 15.5 ML/M^2
BH CV ECHO MEAS - SUP REN AO DIAM: 2.6 CM
BH CV ECHO MEAS - SV(AO): 316.3 ML
BH CV ECHO MEAS - SV(CUBED): 39.4 ML
BH CV ECHO MEAS - SV(LVOT): 103.8 ML
BH CV ECHO MEAS - SV(MOD-SP2): 63 ML
BH CV ECHO MEAS - SV(MOD-SP4): 60 ML
BH CV ECHO MEAS - SV(RVOT): 64 ML
BH CV ECHO MEAS - SV(TEICH): 37.9 ML
BH CV ECHO MEAS - TAPSE (>1.6): 2.8 CM
BH CV ECHO MEAS - TR MAX VEL: 244.3 CM/SEC
BH CV ECHO MEASUREMENTS AVERAGE E/E' RATIO: 14.29
BH CV XLRA - RV BASE: 3.4 CM
BH CV XLRA - RV LENGTH: 8.5 CM
BH CV XLRA - RV MID: 3.4 CM
BH CV XLRA - TDI S': 11 CM/SEC
LEFT ATRIUM VOLUME INDEX: 23 ML/M2
MAXIMAL PREDICTED HEART RATE: 150 BPM
SINUS: 2.5 CM
STJ: 2 CM
STRESS TARGET HR: 128 BPM

## 2022-12-11 ENCOUNTER — INPATIENT HOSPITAL (OUTPATIENT)
Dept: URBAN - METROPOLITAN AREA HOSPITAL 76 | Facility: HOSPITAL | Age: 72
End: 2022-12-11

## 2022-12-11 DIAGNOSIS — K92.1 MELENA: ICD-10-CM

## 2022-12-11 DIAGNOSIS — D50.9 IRON DEFICIENCY ANEMIA, UNSPECIFIED: ICD-10-CM

## 2022-12-11 DIAGNOSIS — K31.84 GASTROPARESIS: ICD-10-CM

## 2022-12-11 DIAGNOSIS — K44.9 DIAPHRAGMATIC HERNIA WITHOUT OBSTRUCTION OR GANGRENE: ICD-10-CM

## 2022-12-11 PROCEDURE — 43235 EGD DIAGNOSTIC BRUSH WASH: CPT | Performed by: INTERNAL MEDICINE

## 2023-01-01 NOTE — CASE MANAGEMENT/SOCIAL WORK
Continued Stay Note  [facility]     Patient Name: Grace Renee  MRN: [id number]  Today's Date: 11/8/2021    Admit Date: 11/1/2021     Discharge Plan     Row Name 11/08/21 1246       Plan    Plan SNF referrals pending. May need w/c van depending on home O2 needs    Patient/Family in Agreement with Plan yes    Plan Comments CCP spoke to patient at bedside regarding upodated d/c plan. Patient is no longer on eliquis. Plan is SNF. Patient agreeable to referrals around [facility]. CCP made referrals to [facility], [facility], [facility], and the [facility]. CCP left [facility] for [facility] to see if they have any beds available in the next few days. Patient states she has family that can transport her to the SNF. If patient is d/c on O2, she will need a wheelchair van. Patient agreeable to wheelchair van. CCP to follow pending SNF referrals. CCP to follow for home O2 needs. MAYTE Villegas               Discharge Codes    No documentation.               Expected Discharge Date and Time     Expected Discharge Date Expected Discharge Time    Nov 9, 2021             MAYTE OLIVER     (4) rarely moist

## 2023-01-31 RX ORDER — LISINOPRIL 5 MG/1
TABLET ORAL
Qty: 180 TABLET | Refills: 0 | OUTPATIENT
Start: 2023-01-31

## 2023-02-20 RX ORDER — LISINOPRIL 5 MG/1
TABLET ORAL
Qty: 180 TABLET | Refills: 0 | OUTPATIENT
Start: 2023-02-20

## 2023-02-21 ENCOUNTER — OFFICE (OUTPATIENT)
Dept: URBAN - METROPOLITAN AREA CLINIC 64 | Facility: CLINIC | Age: 73
End: 2023-02-21

## 2023-02-21 VITALS
DIASTOLIC BLOOD PRESSURE: 86 MMHG | HEART RATE: 88 BPM | SYSTOLIC BLOOD PRESSURE: 124 MMHG | WEIGHT: 293 LBS | HEIGHT: 66 IN

## 2023-02-21 DIAGNOSIS — R11.10 VOMITING, UNSPECIFIED: ICD-10-CM

## 2023-02-21 DIAGNOSIS — K59.09 OTHER CONSTIPATION: ICD-10-CM

## 2023-02-21 DIAGNOSIS — K31.84 GASTROPARESIS: ICD-10-CM

## 2023-02-21 DIAGNOSIS — R94.5 ABNORMAL RESULTS OF LIVER FUNCTION STUDIES: ICD-10-CM

## 2023-02-21 PROCEDURE — 99214 OFFICE O/P EST MOD 30 MIN: CPT | Performed by: INTERNAL MEDICINE

## 2023-02-21 RX ORDER — METOCLOPRAMIDE HYDROCHLORIDE 10 MG/1
30 TABLET ORAL
Qty: 90 | Refills: 11 | Status: ACTIVE
Start: 2023-02-21

## 2023-05-24 ENCOUNTER — OFFICE (OUTPATIENT)
Dept: URBAN - METROPOLITAN AREA CLINIC 64 | Facility: CLINIC | Age: 73
End: 2023-05-24
Payer: COMMERCIAL

## 2023-05-24 VITALS
SYSTOLIC BLOOD PRESSURE: 182 MMHG | DIASTOLIC BLOOD PRESSURE: 63 MMHG | WEIGHT: 293 LBS | HEART RATE: 77 BPM | HEIGHT: 66 IN

## 2023-05-24 DIAGNOSIS — K59.09 OTHER CONSTIPATION: ICD-10-CM

## 2023-05-24 DIAGNOSIS — K31.84 GASTROPARESIS: ICD-10-CM

## 2023-05-24 DIAGNOSIS — R13.10 DYSPHAGIA, UNSPECIFIED: ICD-10-CM

## 2023-05-24 DIAGNOSIS — K75.81 NONALCOHOLIC STEATOHEPATITIS (NASH): ICD-10-CM

## 2023-05-24 PROCEDURE — 99214 OFFICE O/P EST MOD 30 MIN: CPT | Performed by: INTERNAL MEDICINE

## 2023-05-24 RX ORDER — METOCLOPRAMIDE HYDROCHLORIDE 10 MG/1
30 TABLET ORAL
Qty: 90 | Refills: 11 | Status: ACTIVE
Start: 2023-02-21

## 2023-06-18 NOTE — NURSING NOTE
"Diabetes Education  Assessment/Teaching    Patient Name:  Grace Renee  YOB: 1950  MRN: [id number]  Admit Date:  11/1/2021      Assessment Date:  11/4/2021    Most Recent Value   General Information     Referral From: Other -order set. Meet with 72 y/o at bedside to assess needs for DM ed.    Height 170.2 cm (67\")   Weight 127 kg (280 lb)   Diabetes History    What type of diabetes do you have? Type 2   Length of Diabetes Diagnosis -- 20+ yrs per pt.   Current DM knowledge good   Do you test your blood sugar at home? yes   Have you had high blood sugar? (>140mg/dl) yes -current a1C >9%.   Education Preferences    Barriers to Learning -- no barriers noted. pt up in chair. NAD.   Assessment Topics    Taking Medication - Assessment Competent. Humulin 70/30 50 units in a.m and 30 units between 1900 and 2000 per pt.   Healthy Coping - Assessment Competent   Monitoring - Assessment Competent   DM Goals    Contact Plan Follow-up medical care            Most Recent Value   DM Education Needs    Meter Has own   Medication Insulin via Pen -at home.   Healthy Coping Appropriate -encourage pt consistency w/home BG checks and rx'd insulin administration.    Discharge Plan Home   Motivation Engaged   Teaching Method Discussion   Patient Response Verbalized understanding      Other Comments:    Electronically signed by:  Melissa Navarro, RN, BSN  11/04/21 15:07 EDT  "
Patient desaturated off oxygen to 88% while sleeping. Nurse placed 1 liter on patient and oxygen saturation angel luis to 93% slowly. Dr Gordon was made aware of this issue.   
Patient hptt lab result dropped significantly from morning lab from 100.7 to 44.2. Nurse spoke with charge nurse and reorder hptt for now to verify.  
Patient's BP dropping to sbp 70s. Started c/o chest pain. S/w dr. Newell - ordered transfer to CCU. [facility] notified.  
Irritable/Anxious

## 2023-06-20 ENCOUNTER — OFFICE (OUTPATIENT)
Dept: URBAN - METROPOLITAN AREA CLINIC 51 | Facility: CLINIC | Age: 73
End: 2023-06-20

## 2023-06-20 DIAGNOSIS — R13.10 DYSPHAGIA, UNSPECIFIED: ICD-10-CM

## 2023-06-20 DIAGNOSIS — K31.84 GASTROPARESIS: ICD-10-CM

## 2023-06-20 PROCEDURE — 91037 ESOPH IMPED FUNCTION TEST: CPT | Mod: 59 | Performed by: INTERNAL MEDICINE

## 2023-06-20 PROCEDURE — 91010 ESOPHAGUS MOTILITY STUDY: CPT | Performed by: INTERNAL MEDICINE

## 2023-09-11 ENCOUNTER — OFFICE (OUTPATIENT)
Dept: URBAN - METROPOLITAN AREA CLINIC 64 | Facility: CLINIC | Age: 73
End: 2023-09-11

## 2023-09-11 VITALS
HEIGHT: 66 IN | SYSTOLIC BLOOD PRESSURE: 148 MMHG | WEIGHT: 293 LBS | HEART RATE: 89 BPM | DIASTOLIC BLOOD PRESSURE: 69 MMHG

## 2023-09-11 DIAGNOSIS — K59.00 CONSTIPATION, UNSPECIFIED: ICD-10-CM

## 2023-09-11 DIAGNOSIS — R94.5 ABNORMAL RESULTS OF LIVER FUNCTION STUDIES: ICD-10-CM

## 2023-09-11 DIAGNOSIS — R13.10 DYSPHAGIA, UNSPECIFIED: ICD-10-CM

## 2023-09-11 DIAGNOSIS — K75.81 NONALCOHOLIC STEATOHEPATITIS (NASH): ICD-10-CM

## 2023-09-11 DIAGNOSIS — K31.84 GASTROPARESIS: ICD-10-CM

## 2023-09-11 PROCEDURE — 99213 OFFICE O/P EST LOW 20 MIN: CPT | Performed by: INTERNAL MEDICINE

## 2024-03-12 ENCOUNTER — OFFICE (OUTPATIENT)
Dept: URBAN - METROPOLITAN AREA CLINIC 64 | Facility: CLINIC | Age: 74
End: 2024-03-12

## 2024-03-12 VITALS
HEIGHT: 66 IN | DIASTOLIC BLOOD PRESSURE: 94 MMHG | SYSTOLIC BLOOD PRESSURE: 135 MMHG | HEART RATE: 73 BPM | WEIGHT: 293 LBS

## 2024-03-12 DIAGNOSIS — K31.84 GASTROPARESIS: ICD-10-CM

## 2024-03-12 DIAGNOSIS — K21.9 GASTRO-ESOPHAGEAL REFLUX DISEASE WITHOUT ESOPHAGITIS: ICD-10-CM

## 2024-03-12 DIAGNOSIS — K59.09 OTHER CONSTIPATION: ICD-10-CM

## 2024-03-12 DIAGNOSIS — R13.10 DYSPHAGIA, UNSPECIFIED: ICD-10-CM

## 2024-03-12 DIAGNOSIS — R68.81 EARLY SATIETY: ICD-10-CM

## 2024-03-12 PROCEDURE — 99213 OFFICE O/P EST LOW 20 MIN: CPT

## 2024-03-12 RX ORDER — SORBITOL SOLUTION 70 %
SOLUTION, ORAL MISCELLANEOUS
Qty: 100 | Refills: 0 | Status: ACTIVE
Start: 2024-03-12

## 2024-05-14 ENCOUNTER — OFFICE (OUTPATIENT)
Dept: URBAN - METROPOLITAN AREA CLINIC 64 | Facility: CLINIC | Age: 74
End: 2024-05-14

## 2024-05-14 VITALS
DIASTOLIC BLOOD PRESSURE: 66 MMHG | WEIGHT: 293 LBS | SYSTOLIC BLOOD PRESSURE: 136 MMHG | HEIGHT: 66 IN | HEART RATE: 75 BPM

## 2024-05-14 DIAGNOSIS — K21.9 GASTRO-ESOPHAGEAL REFLUX DISEASE WITHOUT ESOPHAGITIS: ICD-10-CM

## 2024-05-14 DIAGNOSIS — K58.1 IRRITABLE BOWEL SYNDROME WITH CONSTIPATION: ICD-10-CM

## 2024-05-14 DIAGNOSIS — K31.84 GASTROPARESIS: ICD-10-CM

## 2024-05-14 PROCEDURE — 99213 OFFICE O/P EST LOW 20 MIN: CPT

## 2024-05-14 RX ORDER — LINACLOTIDE 290 UG/1
290 CAPSULE, GELATIN COATED ORAL
Qty: 90 | Refills: 3 | Status: ACTIVE
Start: 2024-05-14

## 2024-10-25 ENCOUNTER — APPOINTMENT (OUTPATIENT)
Dept: CARDIOLOGY | Facility: HOSPITAL | Age: 74
DRG: 305 | End: 2024-10-25
Payer: COMMERCIAL

## 2024-10-25 ENCOUNTER — APPOINTMENT (OUTPATIENT)
Dept: GENERAL RADIOLOGY | Facility: HOSPITAL | Age: 74
DRG: 305 | End: 2024-10-25
Payer: COMMERCIAL

## 2024-10-25 ENCOUNTER — HOSPITAL ENCOUNTER (INPATIENT)
Facility: HOSPITAL | Age: 74
LOS: 3 days | Discharge: HOME | DRG: 305 | End: 2024-10-28
Attending: EMERGENCY MEDICINE | Admitting: STUDENT IN AN ORGANIZED HEALTH CARE EDUCATION/TRAINING PROGRAM
Payer: COMMERCIAL

## 2024-10-25 ENCOUNTER — APPOINTMENT (OUTPATIENT)
Dept: CT IMAGING | Facility: HOSPITAL | Age: 74
DRG: 305 | End: 2024-10-25
Payer: COMMERCIAL

## 2024-10-25 DIAGNOSIS — R70.0 ELEVATED SED RATE: ICD-10-CM

## 2024-10-25 DIAGNOSIS — I16.0 HYPERTENSIVE URGENCY: ICD-10-CM

## 2024-10-25 DIAGNOSIS — I16.1 HYPERTENSIVE EMERGENCY: Primary | ICD-10-CM

## 2024-10-25 DIAGNOSIS — R51.9 ACUTE NONINTRACTABLE HEADACHE, UNSPECIFIED HEADACHE TYPE: ICD-10-CM

## 2024-10-25 DIAGNOSIS — M16.12 OSTEOARTHRITIS OF LEFT HIP, UNSPECIFIED OSTEOARTHRITIS TYPE: ICD-10-CM

## 2024-10-25 DIAGNOSIS — M25.552 LEFT HIP PAIN: ICD-10-CM

## 2024-10-25 LAB
ANION GAP SERPL CALCULATED.3IONS-SCNC: 10.7 MMOL/L (ref 5–15)
AORTIC DIMENSIONLESS INDEX: 0.79 (DI)
BASOPHILS # BLD AUTO: 0.14 10*3/MM3 (ref 0–0.2)
BASOPHILS NFR BLD AUTO: 1.1 % (ref 0–1.5)
BH CV ECHO MEAS - ACS: 2 CM
BH CV ECHO MEAS - AO MAX PG: 21 MMHG
BH CV ECHO MEAS - AO MEAN PG: 12 MMHG
BH CV ECHO MEAS - AO V2 MAX: 229 CM/SEC
BH CV ECHO MEAS - AO V2 VTI: 40.7 CM
BH CV ECHO MEAS - AVA(I,D): 2.5 CM2
BH CV ECHO MEAS - EDV(CUBED): 85.2 ML
BH CV ECHO MEAS - EDV(MOD-SP4): 72.3 ML
BH CV ECHO MEAS - EF(MOD-SP4): 68 %
BH CV ECHO MEAS - ESV(CUBED): 17.6 ML
BH CV ECHO MEAS - ESV(MOD-SP4): 23.1 ML
BH CV ECHO MEAS - FS: 40.9 %
BH CV ECHO MEAS - IVS/LVPW: 0.92 CM
BH CV ECHO MEAS - IVSD: 1.1 CM
BH CV ECHO MEAS - LA DIMENSION: 4.2 CM
BH CV ECHO MEAS - LAT PEAK E' VEL: 13.1 CM/SEC
BH CV ECHO MEAS - LV MASS(C)D: 180 GRAMS
BH CV ECHO MEAS - LV MAX PG: 13.2 MMHG
BH CV ECHO MEAS - LV MEAN PG: 8 MMHG
BH CV ECHO MEAS - LV V1 MAX: 182 CM/SEC
BH CV ECHO MEAS - LV V1 VTI: 36.2 CM
BH CV ECHO MEAS - LVIDD: 4.4 CM
BH CV ECHO MEAS - LVIDS: 2.6 CM
BH CV ECHO MEAS - LVOT AREA: 2.8 CM2
BH CV ECHO MEAS - LVOT DIAM: 1.9 CM
BH CV ECHO MEAS - LVPWD: 1.2 CM
BH CV ECHO MEAS - MED PEAK E' VEL: 9.9 CM/SEC
BH CV ECHO MEAS - MR MAX PG: 62.1 MMHG
BH CV ECHO MEAS - MR MAX VEL: 394 CM/SEC
BH CV ECHO MEAS - MV A MAX VEL: 135 CM/SEC
BH CV ECHO MEAS - MV DEC SLOPE: 707 CM/SEC2
BH CV ECHO MEAS - MV DEC TIME: 0.29 SEC
BH CV ECHO MEAS - MV E MAX VEL: 142 CM/SEC
BH CV ECHO MEAS - MV E/A: 1.05
BH CV ECHO MEAS - MV MAX PG: 13.1 MMHG
BH CV ECHO MEAS - MV MEAN PG: 7 MMHG
BH CV ECHO MEAS - MV P1/2T: 84.5 MSEC
BH CV ECHO MEAS - MV V2 VTI: 36.3 CM
BH CV ECHO MEAS - MVA(P1/2T): 2.6 CM2
BH CV ECHO MEAS - MVA(VTI): 2.8 CM2
BH CV ECHO MEAS - PA V2 MAX: 173 CM/SEC
BH CV ECHO MEAS - QP/QS: 0.51
BH CV ECHO MEAS - RV MAX PG: 6.6 MMHG
BH CV ECHO MEAS - RV V1 MAX: 128 CM/SEC
BH CV ECHO MEAS - RV V1 VTI: 25.8 CM
BH CV ECHO MEAS - RVDD: 3.2 CM
BH CV ECHO MEAS - RVOT DIAM: 1.6 CM
BH CV ECHO MEAS - SV(LVOT): 102.6 ML
BH CV ECHO MEAS - SV(MOD-SP4): 49.2 ML
BH CV ECHO MEAS - SV(RVOT): 51.9 ML
BH CV ECHO MEAS - TAPSE (>1.6): 3.3 CM
BH CV ECHO MEASUREMENTS AVERAGE E/E' RATIO: 12.35
BUN SERPL-MCNC: 19 MG/DL (ref 8–23)
BUN/CREAT SERPL: 17.1 (ref 7–25)
CALCIUM SPEC-SCNC: 9.7 MG/DL (ref 8.6–10.5)
CHLORIDE SERPL-SCNC: 100 MMOL/L (ref 98–107)
CO2 SERPL-SCNC: 28.3 MMOL/L (ref 22–29)
CREAT SERPL-MCNC: 1.11 MG/DL (ref 0.57–1)
CRP SERPL-MCNC: 0.36 MG/DL (ref 0–0.5)
DEPRECATED RDW RBC AUTO: 45.8 FL (ref 37–54)
EGFRCR SERPLBLD CKD-EPI 2021: 52.6 ML/MIN/1.73
EOSINOPHIL # BLD AUTO: 0.61 10*3/MM3 (ref 0–0.4)
EOSINOPHIL NFR BLD AUTO: 4.8 % (ref 0.3–6.2)
ERYTHROCYTE [DISTWIDTH] IN BLOOD BY AUTOMATED COUNT: 14.2 % (ref 12.3–15.4)
ERYTHROCYTE [SEDIMENTATION RATE] IN BLOOD: 106 MM/HR (ref 0–30)
GLUCOSE BLDC GLUCOMTR-MCNC: 138 MG/DL (ref 70–105)
GLUCOSE BLDC GLUCOMTR-MCNC: 252 MG/DL (ref 70–105)
GLUCOSE BLDC GLUCOMTR-MCNC: 357 MG/DL (ref 70–105)
GLUCOSE BLDC GLUCOMTR-MCNC: 419 MG/DL (ref 70–105)
GLUCOSE BLDC GLUCOMTR-MCNC: 452 MG/DL (ref 70–105)
GLUCOSE BLDC GLUCOMTR-MCNC: 482 MG/DL (ref 70–105)
GLUCOSE SERPL-MCNC: 145 MG/DL (ref 65–99)
HCT VFR BLD AUTO: 44.9 % (ref 34–46.6)
HGB BLD-MCNC: 13.5 G/DL (ref 12–15.9)
HOLD SPECIMEN: NORMAL
IMM GRANULOCYTES # BLD AUTO: 0.03 10*3/MM3 (ref 0–0.05)
IMM GRANULOCYTES NFR BLD AUTO: 0.2 % (ref 0–0.5)
INR PPP: 2.38 (ref 2–3)
LYMPHOCYTES # BLD AUTO: 5.81 10*3/MM3 (ref 0.7–3.1)
LYMPHOCYTES NFR BLD AUTO: 45.4 % (ref 19.6–45.3)
MCH RBC QN AUTO: 26.8 PG (ref 26.6–33)
MCHC RBC AUTO-ENTMCNC: 30.1 G/DL (ref 31.5–35.7)
MCV RBC AUTO: 89.3 FL (ref 79–97)
MONOCYTES # BLD AUTO: 0.99 10*3/MM3 (ref 0.1–0.9)
MONOCYTES NFR BLD AUTO: 7.7 % (ref 5–12)
NEUTROPHILS NFR BLD AUTO: 40.8 % (ref 42.7–76)
NEUTROPHILS NFR BLD AUTO: 5.22 10*3/MM3 (ref 1.7–7)
NRBC BLD AUTO-RTO: 0 /100 WBC (ref 0–0.2)
NT-PROBNP SERPL-MCNC: 153 PG/ML (ref 0–900)
PLATELET # BLD AUTO: 242 10*3/MM3 (ref 140–450)
PMV BLD AUTO: 11.4 FL (ref 6–12)
POTASSIUM SERPL-SCNC: 3.9 MMOL/L (ref 3.5–5.2)
PROTHROMBIN TIME: 24.3 SECONDS (ref 19.4–28.5)
QT INTERVAL: 440 MS
QTC INTERVAL: 482 MS
RBC # BLD AUTO: 5.03 10*6/MM3 (ref 3.77–5.28)
SINUS: 3.1 CM
SODIUM SERPL-SCNC: 139 MMOL/L (ref 136–145)
TROPONIN T SERPL HS-MCNC: 17 NG/L
WBC NRBC COR # BLD AUTO: 12.8 10*3/MM3 (ref 3.4–10.8)

## 2024-10-25 PROCEDURE — 82948 REAGENT STRIP/BLOOD GLUCOSE: CPT

## 2024-10-25 PROCEDURE — 93005 ELECTROCARDIOGRAM TRACING: CPT | Performed by: EMERGENCY MEDICINE

## 2024-10-25 PROCEDURE — 80048 BASIC METABOLIC PNL TOTAL CA: CPT | Performed by: EMERGENCY MEDICINE

## 2024-10-25 PROCEDURE — 83880 ASSAY OF NATRIURETIC PEPTIDE: CPT

## 2024-10-25 PROCEDURE — 85652 RBC SED RATE AUTOMATED: CPT | Performed by: EMERGENCY MEDICINE

## 2024-10-25 PROCEDURE — 97162 PT EVAL MOD COMPLEX 30 MIN: CPT | Performed by: PHYSICAL THERAPIST

## 2024-10-25 PROCEDURE — 84484 ASSAY OF TROPONIN QUANT: CPT | Performed by: EMERGENCY MEDICINE

## 2024-10-25 PROCEDURE — 87040 BLOOD CULTURE FOR BACTERIA: CPT | Performed by: NURSE PRACTITIONER

## 2024-10-25 PROCEDURE — 25810000003 SODIUM CHLORIDE 0.9 % SOLUTION 250 ML FLEX CONT: Performed by: EMERGENCY MEDICINE

## 2024-10-25 PROCEDURE — 86140 C-REACTIVE PROTEIN: CPT

## 2024-10-25 PROCEDURE — 85025 COMPLETE CBC W/AUTO DIFF WBC: CPT | Performed by: EMERGENCY MEDICINE

## 2024-10-25 PROCEDURE — 70450 CT HEAD/BRAIN W/O DYE: CPT

## 2024-10-25 PROCEDURE — 25010000002 PROCHLORPERAZINE 10 MG/2ML SOLUTION: Performed by: EMERGENCY MEDICINE

## 2024-10-25 PROCEDURE — 85610 PROTHROMBIN TIME: CPT | Performed by: EMERGENCY MEDICINE

## 2024-10-25 PROCEDURE — 93306 TTE W/DOPPLER COMPLETE: CPT | Performed by: INTERNAL MEDICINE

## 2024-10-25 PROCEDURE — 93306 TTE W/DOPPLER COMPLETE: CPT

## 2024-10-25 PROCEDURE — 25010000002 NICARDIPINE 2.5 MG/ML SOLUTION 10 ML VIAL: Performed by: EMERGENCY MEDICINE

## 2024-10-25 PROCEDURE — 63710000001 INSULIN LISPRO (HUMAN) PER 5 UNITS

## 2024-10-25 PROCEDURE — 25010000002 METHYLPREDNISOLONE PER 125 MG: Performed by: EMERGENCY MEDICINE

## 2024-10-25 PROCEDURE — 73502 X-RAY EXAM HIP UNI 2-3 VIEWS: CPT

## 2024-10-25 PROCEDURE — 99223 1ST HOSP IP/OBS HIGH 75: CPT | Performed by: INTERNAL MEDICINE

## 2024-10-25 PROCEDURE — 99222 1ST HOSP IP/OBS MODERATE 55: CPT | Performed by: NURSE PRACTITIONER

## 2024-10-25 PROCEDURE — 25010000002 HYDROMORPHONE 1 MG/ML SOLUTION: Performed by: EMERGENCY MEDICINE

## 2024-10-25 PROCEDURE — 99291 CRITICAL CARE FIRST HOUR: CPT

## 2024-10-25 PROCEDURE — 25010000002 DIPHENHYDRAMINE PER 50 MG: Performed by: EMERGENCY MEDICINE

## 2024-10-25 RX ORDER — SODIUM CHLORIDE 0.9 % (FLUSH) 0.9 %
10 SYRINGE (ML) INJECTION AS NEEDED
Status: DISCONTINUED | OUTPATIENT
Start: 2024-10-25 | End: 2024-10-28 | Stop reason: HOSPADM

## 2024-10-25 RX ORDER — INSULIN HUMAN 100 [IU]/ML
30 INJECTION, SUSPENSION SUBCUTANEOUS EVERY EVENING
COMMUNITY

## 2024-10-25 RX ORDER — BISACODYL 10 MG
10 SUPPOSITORY, RECTAL RECTAL DAILY PRN
Status: DISCONTINUED | OUTPATIENT
Start: 2024-10-25 | End: 2024-10-28 | Stop reason: HOSPADM

## 2024-10-25 RX ORDER — GABAPENTIN 300 MG/1
300 CAPSULE ORAL 3 TIMES DAILY PRN
COMMUNITY

## 2024-10-25 RX ORDER — OXYCODONE HYDROCHLORIDE 5 MG/1
5 TABLET ORAL EVERY 6 HOURS PRN
Status: DISCONTINUED | OUTPATIENT
Start: 2024-10-25 | End: 2024-10-28 | Stop reason: HOSPADM

## 2024-10-25 RX ORDER — SODIUM CHLORIDE 0.9 % (FLUSH) 0.9 %
10 SYRINGE (ML) INJECTION EVERY 12 HOURS SCHEDULED
Status: DISCONTINUED | OUTPATIENT
Start: 2024-10-25 | End: 2024-10-28 | Stop reason: HOSPADM

## 2024-10-25 RX ORDER — INSULIN LISPRO 100 [IU]/ML
2-9 INJECTION, SOLUTION INTRAVENOUS; SUBCUTANEOUS
Status: DISCONTINUED | OUTPATIENT
Start: 2024-10-25 | End: 2024-10-25

## 2024-10-25 RX ORDER — INSULIN LISPRO 100 [IU]/ML
10 INJECTION, SOLUTION INTRAVENOUS; SUBCUTANEOUS
Status: DISCONTINUED | OUTPATIENT
Start: 2024-10-25 | End: 2024-10-26

## 2024-10-25 RX ORDER — ALLOPURINOL 100 MG/1
100 TABLET ORAL NIGHTLY
Status: DISCONTINUED | OUTPATIENT
Start: 2024-10-25 | End: 2024-10-28 | Stop reason: HOSPADM

## 2024-10-25 RX ORDER — ATORVASTATIN CALCIUM 20 MG/1
20 TABLET, FILM COATED ORAL DAILY
Status: DISCONTINUED | OUTPATIENT
Start: 2024-10-25 | End: 2024-10-28 | Stop reason: HOSPADM

## 2024-10-25 RX ORDER — NITROGLYCERIN 0.4 MG/1
0.4 TABLET SUBLINGUAL
Status: DISCONTINUED | OUTPATIENT
Start: 2024-10-25 | End: 2024-10-28 | Stop reason: HOSPADM

## 2024-10-25 RX ORDER — FUROSEMIDE 20 MG/1
20 TABLET ORAL DAILY
Status: DISCONTINUED | OUTPATIENT
Start: 2024-10-25 | End: 2024-10-28 | Stop reason: HOSPADM

## 2024-10-25 RX ORDER — IBUPROFEN 600 MG/1
1 TABLET ORAL
Status: DISCONTINUED | OUTPATIENT
Start: 2024-10-25 | End: 2024-10-25

## 2024-10-25 RX ORDER — POTASSIUM CHLORIDE 750 MG/1
10 TABLET, FILM COATED, EXTENDED RELEASE ORAL DAILY
Status: DISCONTINUED | OUTPATIENT
Start: 2024-10-25 | End: 2024-10-28 | Stop reason: HOSPADM

## 2024-10-25 RX ORDER — CALCIUM CARBONATE 500 MG/1
1 TABLET, CHEWABLE ORAL 3 TIMES DAILY PRN
Status: DISCONTINUED | OUTPATIENT
Start: 2024-10-25 | End: 2024-10-28 | Stop reason: HOSPADM

## 2024-10-25 RX ORDER — AMOXICILLIN 250 MG
2 CAPSULE ORAL 2 TIMES DAILY PRN
Status: DISCONTINUED | OUTPATIENT
Start: 2024-10-25 | End: 2024-10-28 | Stop reason: HOSPADM

## 2024-10-25 RX ORDER — NICOTINE POLACRILEX 4 MG
15 LOZENGE BUCCAL
Status: DISCONTINUED | OUTPATIENT
Start: 2024-10-25 | End: 2024-10-25

## 2024-10-25 RX ORDER — ONDANSETRON 2 MG/ML
4 INJECTION INTRAMUSCULAR; INTRAVENOUS EVERY 6 HOURS PRN
Status: DISCONTINUED | OUTPATIENT
Start: 2024-10-25 | End: 2024-10-28 | Stop reason: HOSPADM

## 2024-10-25 RX ORDER — INSULIN HUMAN 100 [IU]/ML
50 INJECTION, SUSPENSION SUBCUTANEOUS EVERY MORNING
COMMUNITY

## 2024-10-25 RX ORDER — DEXTROSE MONOHYDRATE 25 G/50ML
25 INJECTION, SOLUTION INTRAVENOUS
Status: DISCONTINUED | OUTPATIENT
Start: 2024-10-25 | End: 2024-10-26 | Stop reason: SDUPTHER

## 2024-10-25 RX ORDER — METOPROLOL SUCCINATE 50 MG/1
50 TABLET, EXTENDED RELEASE ORAL
Status: DISCONTINUED | OUTPATIENT
Start: 2024-10-25 | End: 2024-10-28 | Stop reason: HOSPADM

## 2024-10-25 RX ORDER — DIPHENHYDRAMINE HYDROCHLORIDE 50 MG/ML
25 INJECTION INTRAMUSCULAR; INTRAVENOUS ONCE
Status: COMPLETED | OUTPATIENT
Start: 2024-10-25 | End: 2024-10-25

## 2024-10-25 RX ORDER — ACETAMINOPHEN 650 MG/1
650 SUPPOSITORY RECTAL EVERY 4 HOURS PRN
Status: DISCONTINUED | OUTPATIENT
Start: 2024-10-25 | End: 2024-10-28 | Stop reason: HOSPADM

## 2024-10-25 RX ORDER — PROCHLORPERAZINE EDISYLATE 5 MG/ML
10 INJECTION INTRAMUSCULAR; INTRAVENOUS ONCE
Status: COMPLETED | OUTPATIENT
Start: 2024-10-25 | End: 2024-10-25

## 2024-10-25 RX ORDER — IBUPROFEN 600 MG/1
1 TABLET ORAL
Status: DISCONTINUED | OUTPATIENT
Start: 2024-10-25 | End: 2024-10-26 | Stop reason: SDUPTHER

## 2024-10-25 RX ORDER — ACETAMINOPHEN 160 MG/5ML
650 SOLUTION ORAL EVERY 4 HOURS PRN
Status: DISCONTINUED | OUTPATIENT
Start: 2024-10-25 | End: 2024-10-28 | Stop reason: HOSPADM

## 2024-10-25 RX ORDER — BISACODYL 5 MG/1
5 TABLET, DELAYED RELEASE ORAL DAILY PRN
Status: DISCONTINUED | OUTPATIENT
Start: 2024-10-25 | End: 2024-10-28 | Stop reason: HOSPADM

## 2024-10-25 RX ORDER — LISINOPRIL 5 MG/1
5 TABLET ORAL 2 TIMES DAILY
Status: DISCONTINUED | OUTPATIENT
Start: 2024-10-25 | End: 2024-10-25

## 2024-10-25 RX ORDER — PANTOPRAZOLE SODIUM 40 MG/1
40 TABLET, DELAYED RELEASE ORAL
Status: DISCONTINUED | OUTPATIENT
Start: 2024-10-26 | End: 2024-10-28 | Stop reason: HOSPADM

## 2024-10-25 RX ORDER — LISINOPRIL 5 MG/1
10 TABLET ORAL
Status: DISCONTINUED | OUTPATIENT
Start: 2024-10-25 | End: 2024-10-25 | Stop reason: DRUGHIGH

## 2024-10-25 RX ORDER — NICOTINE POLACRILEX 4 MG
15 LOZENGE BUCCAL
Status: DISCONTINUED | OUTPATIENT
Start: 2024-10-25 | End: 2024-10-26 | Stop reason: SDUPTHER

## 2024-10-25 RX ORDER — POTASSIUM CHLORIDE 750 MG/1
10 TABLET, EXTENDED RELEASE ORAL DAILY
COMMUNITY

## 2024-10-25 RX ORDER — METOCLOPRAMIDE 10 MG/1
10 TABLET ORAL DAILY
COMMUNITY

## 2024-10-25 RX ORDER — METHYLPREDNISOLONE SODIUM SUCCINATE 125 MG/2ML
60 INJECTION, POWDER, LYOPHILIZED, FOR SOLUTION INTRAMUSCULAR; INTRAVENOUS DAILY
Status: DISCONTINUED | OUTPATIENT
Start: 2024-10-26 | End: 2024-10-26

## 2024-10-25 RX ORDER — POLYETHYLENE GLYCOL 3350 17 G/17G
17 POWDER, FOR SOLUTION ORAL DAILY PRN
Status: DISCONTINUED | OUTPATIENT
Start: 2024-10-25 | End: 2024-10-28 | Stop reason: HOSPADM

## 2024-10-25 RX ORDER — ATORVASTATIN CALCIUM 20 MG/1
20 TABLET, FILM COATED ORAL DAILY
COMMUNITY

## 2024-10-25 RX ORDER — WARFARIN SODIUM 5 MG/1
7.5 TABLET ORAL
Status: DISCONTINUED | OUTPATIENT
Start: 2024-10-25 | End: 2024-10-28 | Stop reason: HOSPADM

## 2024-10-25 RX ORDER — ACETAMINOPHEN 325 MG/1
650 TABLET ORAL EVERY 4 HOURS PRN
Status: DISCONTINUED | OUTPATIENT
Start: 2024-10-25 | End: 2024-10-28 | Stop reason: HOSPADM

## 2024-10-25 RX ORDER — METHYLPREDNISOLONE SODIUM SUCCINATE 125 MG/2ML
60 INJECTION, POWDER, LYOPHILIZED, FOR SOLUTION INTRAMUSCULAR; INTRAVENOUS ONCE
Status: COMPLETED | OUTPATIENT
Start: 2024-10-25 | End: 2024-10-25

## 2024-10-25 RX ORDER — GABAPENTIN 300 MG/1
300 CAPSULE ORAL 3 TIMES DAILY PRN
Status: DISCONTINUED | OUTPATIENT
Start: 2024-10-25 | End: 2024-10-28 | Stop reason: HOSPADM

## 2024-10-25 RX ORDER — LISINOPRIL 5 MG/1
5 TABLET ORAL 2 TIMES DAILY
Status: DISCONTINUED | OUTPATIENT
Start: 2024-10-25 | End: 2024-10-27

## 2024-10-25 RX ORDER — INSULIN LISPRO 100 [IU]/ML
4-24 INJECTION, SOLUTION INTRAVENOUS; SUBCUTANEOUS
Status: DISCONTINUED | OUTPATIENT
Start: 2024-10-25 | End: 2024-10-26

## 2024-10-25 RX ADMIN — INSULIN LISPRO 10 UNITS: 100 INJECTION, SOLUTION INTRAVENOUS; SUBCUTANEOUS at 19:09

## 2024-10-25 RX ADMIN — Medication 10 ML: at 09:26

## 2024-10-25 RX ADMIN — INSULIN LISPRO 24 UNITS: 100 INJECTION, SOLUTION INTRAVENOUS; SUBCUTANEOUS at 22:34

## 2024-10-25 RX ADMIN — LISINOPRIL 5 MG: 5 TABLET ORAL at 22:43

## 2024-10-25 RX ADMIN — METOPROLOL SUCCINATE 50 MG: 50 TABLET, EXTENDED RELEASE ORAL at 17:40

## 2024-10-25 RX ADMIN — NICARDIPINE HYDROCHLORIDE 12.5 MG/HR: 25 INJECTION, SOLUTION INTRAVENOUS at 16:44

## 2024-10-25 RX ADMIN — DIPHENHYDRAMINE HYDROCHLORIDE 25 MG: 50 INJECTION, SOLUTION INTRAMUSCULAR; INTRAVENOUS at 04:35

## 2024-10-25 RX ADMIN — FUROSEMIDE 20 MG: 20 TABLET ORAL at 13:47

## 2024-10-25 RX ADMIN — HYDROMORPHONE HYDROCHLORIDE 0.5 MG: 1 INJECTION, SOLUTION INTRAMUSCULAR; INTRAVENOUS; SUBCUTANEOUS at 04:57

## 2024-10-25 RX ADMIN — HYDROMORPHONE HYDROCHLORIDE 0.5 MG: 1 INJECTION, SOLUTION INTRAMUSCULAR; INTRAVENOUS; SUBCUTANEOUS at 05:23

## 2024-10-25 RX ADMIN — INSULIN LISPRO 24 UNITS: 100 INJECTION, SOLUTION INTRAVENOUS; SUBCUTANEOUS at 13:19

## 2024-10-25 RX ADMIN — NICARDIPINE HYDROCHLORIDE 15 MG/HR: 25 INJECTION, SOLUTION INTRAVENOUS at 07:35

## 2024-10-25 RX ADMIN — LISINOPRIL 5 MG: 5 TABLET ORAL at 13:19

## 2024-10-25 RX ADMIN — PROCHLORPERAZINE EDISYLATE 10 MG: 5 INJECTION INTRAMUSCULAR; INTRAVENOUS at 04:35

## 2024-10-25 RX ADMIN — ALLOPURINOL 100 MG: 100 TABLET ORAL at 22:36

## 2024-10-25 RX ADMIN — POTASSIUM CHLORIDE 10 MEQ: 750 TABLET, EXTENDED RELEASE ORAL at 13:19

## 2024-10-25 RX ADMIN — METHYLPREDNISOLONE SODIUM SUCCINATE 60 MG: 125 INJECTION, POWDER, FOR SOLUTION INTRAMUSCULAR; INTRAVENOUS at 04:56

## 2024-10-25 RX ADMIN — NICARDIPINE HYDROCHLORIDE 10 MG/HR: 25 INJECTION, SOLUTION INTRAVENOUS at 04:38

## 2024-10-25 RX ADMIN — INSULIN LISPRO 24 UNITS: 100 INJECTION, SOLUTION INTRAVENOUS; SUBCUTANEOUS at 17:40

## 2024-10-25 RX ADMIN — NICARDIPINE HYDROCHLORIDE 12.5 MG/HR: 25 INJECTION, SOLUTION INTRAVENOUS at 09:26

## 2024-10-25 RX ADMIN — Medication 10 ML: at 22:44

## 2024-10-25 RX ADMIN — ACETAMINOPHEN 650 MG: 325 TABLET, FILM COATED ORAL at 10:40

## 2024-10-25 RX ADMIN — INSULIN LISPRO 8 UNITS: 100 INJECTION, SOLUTION INTRAVENOUS; SUBCUTANEOUS at 09:25

## 2024-10-25 RX ADMIN — NICARDIPINE HYDROCHLORIDE 12.5 MG/HR: 25 INJECTION, SOLUTION INTRAVENOUS at 13:47

## 2024-10-25 RX ADMIN — NICARDIPINE HYDROCHLORIDE 12.5 MG/HR: 25 INJECTION, SOLUTION INTRAVENOUS at 11:27

## 2024-10-25 NOTE — CASE MANAGEMENT/SOCIAL WORK
Discharge Planning Assessment   [facility]     Patient Name: Grace Renee  MRN: [id number]  Today's Date: 10/25/2024    Admit Date: 10/25/2024    Plan: Home   Discharge Needs Assessment       Row Name 10/25/24 1014       Living Environment    People in Home alone;other (see comments)  Stays with sister at her house sometimes and her sister stays at her house sometimes.    Current Living Arrangements home  Stays with sister at her house sometimes and her sister stays at her house sometimes.    Potentially Unsafe Housing Conditions other (see comments)  Voiced she has had some problems with swatters (homeless) in her yard.  Pt. has called the police to have them removed.  Discussed possible need for a home alarm. Pt. concerned they will break into her home.    In the past 12 months has the electric, gas, oil, or water company threatened to shut off services in your home? No    Primary Care Provided by self    Provides Primary Care For no one    Family Caregiver if Needed other relative(s)    Family Caregiver Names Niece and Nephew at times    Quality of Family Relationships helpful;supportive;involved  Sister and one niece and nephew helps at times.    Able to Return to Prior Arrangements yes       Resource/Environmental Concerns    Resource/Environmental Concerns other (see comments)  Homeless swatting in her yard    Transportation Concerns none  Drives  her sister's van.  Pt.'s sister no longer drives.       Transportation Needs    In the past 12 months, has lack of transportation kept you from medical appointments or from getting medications? no    In the past 12 months, has lack of transportation kept you from meetings, work, or from getting things needed for daily living? No       Food Insecurity    Within the past 12 months, you worried that your food would run out before you got the money to buy more. Never true    Within the past 12 months, the food you bought just didn't last and you didn't have money to get  more. Never true       Transition Planning    Patient/Family Anticipates Transition to home;home with family    Patient/Family Anticipated Services at Transition rehabilitation services    Transportation Anticipated car, drives self       Discharge Needs Assessment    Readmission Within the Last 30 Days no previous admission in last 30 days    Equipment Currently Used at Home walker, standard;grab bar    Concerns to be Addressed discharge planning;home safety    Anticipated Changes Related to Illness other (see comments)  Could benefit from rehab.    Current Discharge Risk lives alone;chronically ill                   Discharge Plan       Row Name 10/25/24 1024       Plan    Plan Home    Patient/Family in Agreement with Plan yes    Plan Comments CM met with patient at the bedside to review discharge planning. Patient lives in house and is IADLs. Pt. plans to drive self home at d/c. Confirmed PCP, insurance, and pharmacy. Patient accepts M2B, updated in PAK. Patient denies any difficulty affording food, utilities, or medications. Patient is not current with any HHC/OPPT/OT services. DC Barriers: IV meds,echo, Telemetry, Cardiology Consult.                   Demographic Summary       Row Name 10/25/24 1011       General Information    Admission Type inpatient    Arrived From other (see comments)  Pt. was visiting sister in hospital room here when episode occurred.  Pt. was transported to ED.    Required Notices Provided Important Message from Medicare    Referral Source admission list    Reason for Consult discharge planning    Preferred Language English       Contact Information    Permission Granted to Share Info With                    Functional Status       Row Name 10/25/24 1014       Functional Status    Usual Activity Tolerance good    Current Activity Tolerance poor       Functional Status, IADL    Medications independent    Meal Preparation independent    Housekeeping independent    Laundry  independent    Shopping independent    If for any reason you need help with day-to-day activities such as bathing, preparing meals, shopping, managing finances, etc., do you get the help you need? I could use a little more help                   Patient Forms       Row Name 10/25/24 1030       Patient Forms    Important Message from Medicare ([facility]) Delivered  10/25/24 per Registration                  Hellen Cortes RN    [address] [address]  Phone: [phone]  Fax: [phone]

## 2024-10-25 NOTE — ED NOTES
Chief Complaint   Patient presents with    Headache     Pt states that she's here r/t h/a. Pt states that her h/a started a few minutes ago. Pt states that the pain is on the left side of her head and radiates to her gums. Pt states she did have blurred vision, but vision has returned to normal. Pt denies n/v.

## 2024-10-25 NOTE — H&P
[facility] Medicine Services  History & Physical    Patient Name: Grace Renee  : 1950  MRN: [id number]  Primary Care Physician:  Jani Sun MD  Date of admission: 10/25/2024  Date and Time of Service: 10/25/2024 at 0850    Subjective      Chief Complaint: Headache    History of Present Illness: Grace Renee is a 74 y.o. female with a CMH of diabetes mellitus, history of PE, HLD, HTN, CONNOR, GERD, osteoarthritis who presented to [facility] on 10/25/2024 with headache x 2 days.  Patient states that she was asleep when she suddenly woke up with a headache that lasted a few hours with associated left jaw pain. She localizes pain to left temporal region.  Also reports left-sided blurry vision with few hours prior.  Reports that headache is not recurred. Denies neck pain. Denies visual changes, nausea, vomiting, unilateral weakness or paresthesias of extremities.  Denies history of headaches.  She also endorses left groin/hip pain but reports history of arthritis.     In the ED, patient was noted to be hypertensive with systolics greater than 200.  Labs pertinent for elevated sed rate of 106. Troponin 17. Mild leukocytosis WBC 12.8. INR therapeutic at 2.3. EKG was sinus rhythm rate of 72, no evidence of acute ischemia. CT head without acute intracranial abnormalities.  Pelvis x-ray with no acute findings as well.  Patient was started on Cardene drip as well as Solu-Medrol for concerns of giant cell arteritis.  Hospital service to admit for further management.    Review of Systems   Constitutional:  Negative for chills and fever.   Respiratory:  Negative for shortness of breath.    Cardiovascular:  Negative for chest pain.   Gastrointestinal:  Negative for abdominal pain, nausea and vomiting.   Musculoskeletal:         Left hip pain   Neurological:  Positive for headaches. Negative for dizziness, speech difficulty, weakness and numbness.   All other systems reviewed and are  negative.        Personal History     Past Medical History:   Diagnosis Date    Diabetes mellitus     Elevated cholesterol     GERD (gastroesophageal reflux disease)     Hypertension     Osteoarthritis of left hip 11/6/2021    Sleep apnea        Past Surgical History:   Procedure Laterality Date    CARDIAC CATHETERIZATION Bilateral 11/2/2021    Procedure: Percutaneous Mechanical Thrombectomy- INARI;  Surgeon: Chris Pritchett MD;  Location:  [facility];  Service: Cardiovascular;  Laterality: Bilateral;    CARDIAC CATHETERIZATION N/A 11/2/2021    Procedure: Right Heart Cath;  Surgeon: Chris Pritchett MD;  Location:  [facility];  Service: Cardiovascular;  Laterality: N/A;    INTERVENTIONAL RADIOLOGY PROCEDURE Bilateral 11/2/2021    Procedure: Pulmonary Angiogram;  Surgeon: Chris Pritchett MD;  Location:  [facility];  Service: Cardiovascular;  Laterality: Bilateral;       Family History: family history is not on file. Otherwise pertinent FHx was reviewed and not pertinent to current issue.    Social History:  reports that she has never smoked. She has never used smokeless tobacco. She reports that she does not currently use alcohol. She reports that she does not use drugs.    Home Medications:  Prior to Admission Medications       Prescriptions Last Dose Informant Patient Reported? Taking?    allopurinol (ZYLOPRIM) 100 MG tablet 10/24/2024  Yes Yes    Take 1 tablet by mouth Every Night.    atorvastatin (LIPITOR) 20 MG tablet 10/24/2024  Yes Yes    Take 1 tablet by mouth Daily.    calcium carbonate (TUMS) 500 MG chewable tablet   No Yes    Chew 1 tablet 3 (Three) Times a Day As Needed for Indigestion or Heartburn.    Cholecalciferol (Vitamin D3) 50 MCG capsule 10/24/2024  Yes Yes    Take 1 capsule by mouth Daily.    furosemide (LASIX) 20 MG tablet 10/24/2024  No Yes    Take 1 tablet by mouth Daily.    gabapentin (NEURONTIN) 300 MG capsule   Yes Yes    Take 1  capsule by mouth 3 (Three) Times a Day As Needed.    Insulin NPH Isophane & Regular (HumuLIN 70/30 KwikPen) (70-30) 100 UNIT/ML suspension pen-injector 10/24/2024  Yes Yes    Inject 50 Units under the skin into the appropriate area as directed Every Morning.    Insulin NPH Isophane & Regular (HumuLIN 70/30 KwikPen) (70-30) 100 UNIT/ML suspension pen-injector 10/24/2024  Yes Yes    Inject 30 Units under the skin into the appropriate area as directed Every Evening.    lisinopril (PRINIVIL,ZESTRIL) 5 MG tablet 10/24/2024  No Yes    Take 1 tablet by mouth 2 (Two) Times a Day.    omeprazole (priLOSEC) 40 MG capsule 10/24/2024  Yes Yes    Take 1 capsule by mouth Daily.    potassium chloride (KLOR-CON M10) 10 MEQ CR tablet 10/24/2024  Yes Yes    Take 1 tablet by mouth Daily.    tiZANidine (ZANAFLEX) 4 MG tablet   Yes Yes    Take 1 tablet by mouth 3 (Three) Times a Day As Needed for Muscle Spasms.    warfarin (COUMADIN) 7.5 MG tablet 10/24/2024  No Yes    1 tablet every day  Indications: DVT/PE (active thrombosis)              Allergies:  No Known Allergies    Objective      Vitals:   Temp:  [97.1 °F (36.2 °C)] 97.1 °F (36.2 °C)  Heart Rate:  [] 103  Resp:  [16-22] 20  BP: (142-270)/() 143/52  Body mass index is 49.07 kg/m².    Physical Exam  General: 75yo, Alert and oriented, well nourished, no acute distress.  HENT: No tenderness on palpation to left temporal region. Normocephalic, normal hearing, moist oral mucosa, no scleral icterus.  Neck: Supple, non-tender, no carotid bruits, no JVD, no LAD.  Lungs: Clear to auscultation, non-labored respiration.  Heart: RRR, no murmur, gallop or edema.  Abdomen: Soft, nontender, non-distended, + bowel sounds.  Musculoskeletal: Severe left groin TTP normal range of motion and strength, no swelling.  Skin: Skin is warm, dry and pink, no rashes or lesions.  Psychiatric: Cooperative, appropriate mood and affect.    Diagnostic Data:  Lab Results (last 24 hours)        Procedure Component Value Units Date/Time    POC Glucose 4x Daily Before Meals & at Bedtime [[id number]]  (Abnormal) Collected: 10/25/24 0834    Specimen: Blood Updated: 10/25/24 0836     Glucose 357 mg/dL      Comment: Serial Number: [id number]:  [id number]       Single High Sensitivity Troponin T [[id number]]  (Abnormal) Collected: 10/25/24 0419    Specimen: Blood Updated: 10/25/24 0445     HS Troponin T 17 ng/L     Narrative:      High Sensitive Troponin T Reference Range:  <14.0 ng/L- Negative Female for AMI  <22.0 ng/L- Negative Male for AMI  >=14 - Abnormal Female indicating possible myocardial injury.  >=22 - Abnormal Male indicating possible myocardial injury.   Clinicians would have to utilize clinical acumen, EKG, Troponin, and serial changes to determine if it is an Acute Myocardial Infarction or myocardial injury due to an underlying chronic condition.         Basic Metabolic Panel [[id number]]  (Abnormal) Collected: 10/25/24 0419    Specimen: Blood Updated: 10/25/24 0445     Glucose 145 mg/dL      BUN 19 mg/dL      Creatinine 1.11 mg/dL      Sodium 139 mmol/L      Potassium 3.9 mmol/L      Chloride 100 mmol/L      CO2 28.3 mmol/L      Calcium 9.7 mg/dL      BUN/Creatinine Ratio 17.1     Anion Gap 10.7 mmol/L      eGFR 52.6 mL/min/1.73     Narrative:      GFR Normal >60  Chronic Kidney Disease <60  Kidney Failure <15    The GFR formula is only valid for adults with stable renal function between ages 18 and 70.    Protime-INR [[id number]]  (Normal) Collected: 10/25/24 0419    Specimen: Blood Updated: 10/25/24 0434     Protime 24.3 Seconds      INR 2.38    Sedimentation Rate [[id number]]  (Abnormal) Collected: 10/25/24 0419    Specimen: Blood Updated: 10/25/24 0431     Sed Rate 106 mm/hr     Extra Tubes [[id number]] Collected: 10/25/24 0419    Specimen: Blood, Venous Line Updated: 10/25/24 0431    Narrative:      The following orders were created for panel order Extra Tubes.  Procedure                                Abnormality         Status                     ---------                               -----------         ------                     Gold Top - SST[[id number]]                                   Final result                 Please view results for these tests on the individual orders.    [facility] - SST [[id number]] Collected: 10/25/24 0419    Specimen: Blood Updated: 10/25/24 0431     Extra Tube Hold for add-ons.     Comment: Auto resulted.       CBC & Differential [[id number]]  (Abnormal) Collected: 10/25/24 0419    Specimen: Blood Updated: 10/25/24 0425    Narrative:      The following orders were created for panel order CBC & Differential.  Procedure                               Abnormality         Status                     ---------                               -----------         ------                     CBC Auto Differential[[id number]]        Abnormal            Final result                 Please view results for these tests on the individual orders.    CBC Auto Differential [[id number]]  (Abnormal) Collected: 10/25/24 0419    Specimen: Blood Updated: 10/25/24 0425     WBC 12.80 10*3/mm3      RBC 5.03 10*6/mm3      Hemoglobin 13.5 g/dL      Hematocrit 44.9 %      MCV 89.3 fL      MCH 26.8 pg      MCHC 30.1 g/dL      RDW 14.2 %      RDW-SD 45.8 fl      MPV 11.4 fL      Platelets 242 10*3/mm3      Neutrophil % 40.8 %      Lymphocyte % 45.4 %      Monocyte % 7.7 %      Eosinophil % 4.8 %      Basophil % 1.1 %      Immature Grans % 0.2 %      Neutrophils, Absolute 5.22 10*3/mm3      Lymphocytes, Absolute 5.81 10*3/mm3      Monocytes, Absolute 0.99 10*3/mm3      Eosinophils, Absolute 0.61 10*3/mm3      Basophils, Absolute 0.14 10*3/mm3      Immature Grans, Absolute 0.03 10*3/mm3      nRBC 0.0 /100 WBC     POC Glucose Once [[id number]]  (Abnormal) Collected: 10/25/24 0332    Specimen: Blood Updated: 10/25/24 0338     Glucose 138 mg/dL      Comment: Serial Number: [id number]:  [id number]                 Imaging Results (Last 24 Hours)       Procedure Component Value Units Date/Time    XR Hip With or Without Pelvis 2 - 3 View Left [[id number]] Collected: 10/25/24 0527     Updated: 10/25/24 0530    Narrative:      XR HIP W OR WO PELVIS 2-3 VIEW LEFT    Date of Exam: 10/25/2024 5:24 AM EDT    Indication: pain    Comparison: 11/5/2021.    Findings:  There is severe joint degeneration at the left hip consisting of osteophytes, subcortical sclerosis, complete loss of superior joint space and subcortical cystic degeneration. There are moderate similar degenerative changes of the right hip. There is no   evidence of a fracture or destructive osseous lesion. Bony pelvis appears intact. There is mild pelvic and trochanteric enthesopathy. There are prominent vascular calcifications.      Impression:      Impression:  Severe left and moderate right hip joint degeneration.        Electronically Signed: Otis Shine MD    10/25/2024 5:28 AM EDT    Workstation ID: [id number]    CT Head Without Contrast [[id number]] Collected: 10/25/24 0409     Updated: 10/25/24 0412    Narrative:      CT HEAD WO CONTRAST    Date of Exam: 10/25/2024 3:45 AM EDT    Indication: Headache.    Comparison: 11/1/2021.    Technique: Axial CT images were obtained of the head without contrast administration.  Coronal reconstructions were performed.  Automated exposure control and iterative reconstruction methods were used.      Findings:  Superficial soft tissues appear within normal limits. The calvarium is intact.  Paranasal sinuses and mastoid air cells appear well aerated.  Orbits are unremarkable.  There is no acute intracranial hemorrhage.  No mass effect or midline shift.  No   abnormal extra-axial collections.  Gray-white differentiation is within normal limits.  There are no focal hypoattenuating lesions.  Ventricular size and configuration is normal for age.      Impression:      Impression:  No acute intracranial  abnormality.        Electronically Signed: Otis Shine MD    10/25/2024 4:10 AM EDT    Workstation ID: [id number]              Assessment & Plan        This is a 74 y.o. female with:    Active and Resolved Problems  Active Hospital Problems    Diagnosis  POA    **Hypertensive urgency [I16.0]  Yes      Resolved Hospital Problems   No resolved problems to display.       Hypertensive urgency  History of hypertension  Initial systolic greater than 200, now stable at 170s  Troponin 17, BNP pending  EKG sinus rhythm rate 72, no evidence of acute  Echo 10/25/2024 with EF 61-65%  Continue Cardene drip  Continue lisinopril 5 mg twice daily and Lasix  Will need med optimization    Headache, left temporal region  Elevated sedimentation rate  WBC 12.8, , concern for temporal arteritis  CT head without acute findings  Continue Solu-Medrol  Vascular consulted    Left hip pain  History of osteoarthritis  X-ray of pelvis with severe left and moderate right hip joint degeneration  As needed pain medication    Diabetes mellitus type 2  Anticipate hyperglycemia in setting of exogenous steroids  , steroid hyperglycemia protocol ordered  SSI  Consistent carb diet  Hypoglycemia protocol     History of PE  INR goal 2-3  Continue warfarin, pharmacy to dose    Hyperlipidemia  Continue Lipitor    Gout  Continue allopurinol    GERD  Continue PPI        VTE Prophylaxis:  Pharmacologic VTE prophylaxis orders are present.        The patient desires to be as follows:    CODE STATUS:    Code Status (Patient has no pulse and is not breathing): CPR (Attempt to Resuscitate)  Medical Interventions (Patient has pulse or is breathing): Full Support        Delphine Mcpherson, who can be contacted at [phone], is the designated person to make medical decisions on the patient's behalf if She is incapable of doing so. This was clarified with patient and/or next of kin on 10/25/2024 during the course of this H&P.    Admission Status:  I  believe this patient meets observation status.    Expected Length of Stay: <24 hours    PDMP and Medication Dispenses via Sidebar reviewed and consistent with patient reported medications.    I discussed the patient's findings and my recommendations with patient.      Signature:     This document has been electronically signed by Iveth Steele PA-C on October 25, 2024 10:00 EDT   [facility]

## 2024-10-25 NOTE — NURSING NOTE
With approval of PAOLO Sharma, this House Manger accompanied patient to visit sister in [id number].  Patient in care of this RN from 1415 till 1455.  Cardene drip bag changed per MAR.  Patient remained in stable condition with Bps in 140s-150s systolic for duration of visit with sister.  Patient handoff given back to ANGEL Skelton on PCU.

## 2024-10-25 NOTE — THERAPY EVALUATION
Patient Name: Grace Renee  : 1950    MRN: [id number]                              Today's Date: 10/25/2024       Admit Date: 10/25/2024    Visit Dx:     ICD-10-CM ICD-9-CM   1. Hypertensive emergency  I16.1 401.9   2. Acute nonintractable headache, unspecified headache type  R51.9 784.0   3. Left hip pain  M25.552 719.45   4. Elevated sed rate  R70.0 790.1     Patient Active Problem List   Diagnosis    Bilateral pulmonary embolism    Sleep apnea    Hypertension    GERD (gastroesophageal reflux disease)    Elevated cholesterol    Type 2 diabetes mellitus, with long-term current use of insulin    DINAH (acute kidney injury)    Acute respiratory failure with hypoxia    Anemia    Osteoarthritis of left hip    Hypertensive urgency     Past Medical History:   Diagnosis Date    Diabetes mellitus     Elevated cholesterol     GERD (gastroesophageal reflux disease)     Hypertension     Osteoarthritis of left hip 2021    Sleep apnea      Past Surgical History:   Procedure Laterality Date    CARDIAC CATHETERIZATION Bilateral 2021    Procedure: Percutaneous Mechanical Thrombectomy- INARI;  Surgeon: Chris Pritchett MD;  Location:  [facility];  Service: Cardiovascular;  Laterality: Bilateral;    CARDIAC CATHETERIZATION N/A 2021    Procedure: Right Heart Cath;  Surgeon: Chris Pritchett MD;  Location:  [facility];  Service: Cardiovascular;  Laterality: N/A;    INTERVENTIONAL RADIOLOGY PROCEDURE Bilateral 2021    Procedure: Pulmonary Angiogram;  Surgeon: Chris Pritchett MD;  Location:  [facility];  Service: Cardiovascular;  Laterality: Bilateral;      General Information       Row Name 10/25/24 1247          Physical Therapy Time and Intention    Document Type evaluation  -EJ     Mode of Treatment physical therapy  -EJ       Row Name 10/25/24 1247          General Information    Patient Profile Reviewed yes  -EJ     Prior Level of Function  independent:;bed mobility;transfer;gait;min assist:;ADL's;dressing;bathing  Per pt she uses a rollator at baseline.  She and her sister help eachother out back and forth.  She receives assist with getting into shower, assist with ADLs at times  Requires some assist with cleaning/dishes occasionally - has a niece that helps.  -EJ     Existing Precautions/Restrictions fall  -EJ     Barriers to Rehab physical barrier  -EJ       Row Name 10/25/24 1247          Living Environment    People in Home alone  Pt has a sister and they each will stay at [facility] house back and forth.  -EJ       Row Name 10/25/24 1247          Home Main Entrance    Number of Stairs, Main Entrance one  -EJ       Row Name 10/25/24 1247          Stairs Within Home, Primary    Number of Stairs, Within Home, Primary none  -EJ       Row Name 10/25/24 1247          Cognition    Orientation Status (Cognition) oriented x 4  -EJ       Row Name 10/25/24 1247          Safety Issues/Impairments Affecting Functional Mobility    Impairments Affecting Function (Mobility) balance;pain;endurance/activity tolerance;strength;range of motion (ROM)  -EJ               User Key  (r) = Recorded By, (t) = Taken By, (c) = Cosigned By      Initials Name Provider Type    EJ Taylor Subramanian, PT Physical Therapist                   Mobility       Row Name 10/25/24 1249          Bed Mobility    Bed Mobility bed mobility (all) activities  -EJ     All Activities, [place] (Bed Mobility) minimum assist (75% patient effort)  -EJ       Row Name 10/25/24 1249          Sit-Stand Transfer    Sit-Stand [place] (Transfers) minimum assist (75% patient effort)  -EJ     Assistive Device (Sit-Stand Transfers) walker, front-wheeled  -EJ       Row Name 10/25/24 1249          Gait/Stairs (Locomotion)    [place] Level (Gait) contact guard  -EJ     Assistive Device (Gait) walker, front-wheeled  -EJ     Patient was able to Ambulate yes  -EJ     Distance in Feet (Gait) 5  -EJ      Deviations/Abnormal Patterns (Gait) gait speed decreased;stride length decreased  -EJ     Bilateral Gait Deviations forward flexed posture  -EJ     Left Sided Gait Deviations weight shift ability decreased  -EJ               User Key  (r) = Recorded By, (t) = Taken By, (c) = Cosigned By      Initials Name Provider Type     Taylor Subramanian, PT Physical Therapist                   Obj/Interventions       Row Name 10/25/24 1250          Range of Motion Comprehensive    Comment, General Range of Motion Limited AROM in LLE due to hip pain.  Knee AROM WFL.  RLE grossly WFL.  BUE WFL.  -EJ       Row Name 10/25/24 1250          Strength Comprehensive (MMT)    Comment, General Manual Muscle Testing (MMT) Assessment LLE strength grossly 4/5, RLE strength 3-/5.  -       Row Name 10/25/24 1250          Motor Skills    Motor Skills functional endurance  -EJ     Functional Endurance Fair  -       Row Name 10/25/24 1250          Balance    Balance Assessment sitting static balance;sitting dynamic balance;standing static balance;standing dynamic balance  -EJ     Static Sitting Balance modified independence  -EJ     Dynamic Sitting Balance supervision  -EJ     Position, Sitting Balance unsupported;sitting edge of bed  -EJ     Static Standing Balance contact guard  -EJ     Dynamic Standing Balance contact guard  -EJ     Position/Device Used, Standing Balance walker, rolling  -EJ               User Key  (r) = Recorded By, (t) = Taken By, (c) = Cosigned By      Initials Name Provider Type    Taylor Holland, PT Physical Therapist                   Goals/Plan       Row Name 10/25/24 1301          Bed Mobility Goal 1 (PT)    Activity/Assistive Device (Bed Mobility Goal 1, PT) bed mobility activities, all  -EJ     [place] Level/Cues Needed (Bed Mobility Goal 1, PT) supervision required  -EJ     Time Frame (Bed Mobility Goal 1, PT) long term goal (LTG);2 weeks  -       Row Name 10/25/24 1301          Transfer Goal 1 (PT)     Activity/Assistive Device (Transfer Goal 1, PT) transfers, all;walker, rolling  -EJ     [place] Level/Cues Needed (Transfer Goal 1, PT) standby assist  -EJ     Time Frame (Transfer Goal 1, PT) long term goal (LTG);2 weeks  -EJ       Row Name 10/25/24 1301          Gait Training Goal 1 (PT)    Activity/Assistive Device (Gait Training Goal 1, PT) gait (walking locomotion);walker, rolling  -EJ     [place] Level (Gait Training Goal 1, PT) standby assist  -EJ     Distance (Gait Training Goal 1, PT) 80 feet  -EJ     Time Frame (Gait Training Goal 1, PT) long term goal (LTG);2 weeks  -EJ       Row Name 10/25/24 1301          Stairs Goal 1 (PT)    Activity/Assistive Device (Stairs Goal 1, PT) stairs, all skills  -EJ     [place] Level/Cues Needed (Stairs Goal 1, PT) contact guard required  -EJ     Number of Stairs (Stairs Goal 1, PT) 1  -EJ     Time Frame (Stairs Goal 1, PT) long term goal (LTG);2 weeks  -EJ       Row Name 10/25/24 1301          Therapy Assessment/Plan (PT)    Planned Therapy Interventions (PT) balance training;bed mobility training;gait training;postural re-education;patient/family education;neuromuscular re-education;stair training;strengthening;transfer training  -EJ               User Key  (r) = Recorded By, (t) = Taken By, (c) = Cosigned By      Initials Name Provider Type    EJ Taylor Subramanian, PT Physical Therapist                   Clinical Impression       Row Name 10/25/24 1252          Pain    Pretreatment Pain Rating 8/10  -EJ     Posttreatment Pain Rating 5/10  -EJ     Pain Location hip  -EJ     Pain Side/Orientation right  -EJ     Pain Management Interventions activity modification encouraged;positioning techniques utilized;exercise or physical activity utilized  -EJ     Response to Pain Interventions activity participation with increased pain;functional ability unchanged  -EJ       Row Name 10/25/24 1252          Plan of Care Review    Plan of Care Reviewed With patient  -EJ      Outcome Evaluation Patient is a 79 y/o F who was admitted to [facility] on 10/25/24 with c/o headache on L side (temporal region that refers into her gums).  Reports to also have had blurry vision in her left eye - has since resolved.  Pt reports left hip pain as well that has worsened.  Pt's sister is currently in the hospital, and she has been doing a lot more walking in/out of the hospital.  She thinks this is attributing to some of her pain/issues.  When pt came to ED her BP was significantly elevated and classified as a hypertensive emergency (257/111). It has since come down.  Pt is still undergoing further assessment.  CT head performed and was negative, and L hip x-ray was performed showing severe left and moderate right hip joint degeneration.  PMHx includes diabetes mellitus, history of PE, HLD, HTN, CONNOR, GERD, osteoarthritis.  At baseline, pt is independent and uses a rollator to ambulate with.  She reports she has 1 ANDREEA a [facility].  She and her sister take turns caring for one another.  Pt also has a niece that assists her as needed.  Pt denies any recent falls.  Reports she does have a hard time getting around, doing dishes, cleaning, and she is interested in obtaining more assistance.  During today's evaluation pt required min A for supine to sit, min A for sit to stand, and CGA for short distance ambulation with RW (forward/back and laterally). Pt declines SNF.  Is interetsed in home health care and possibly getting a caregiver.  At this time, PT is recomending home with assist and home health PT.  Anticipate she will improve during her stay, but PT will continue to follow.  -       Row Name 10/25/24 1252          Therapy Assessment/Plan (PT)    Criteria for Skilled Interventions Met (PT) yes;skilled treatment is necessary  -EJ     Therapy Frequency (PT) 5 times/wk  -EJ     Predicted Duration of Therapy Intervention (PT) until discharge  -       Row Name 10/25/24 1252          Vital Signs    Pre Systolic BP  Rehab 178  -EJ     Pre Treatment Diastolic BP 67  -EJ     Post Systolic BP Rehab 164  -EJ     Post Treatment Diastolic BP 67  -EJ     Pretreatment Heart Rate (beats/min) 107  -EJ     Posttreatment Heart Rate (beats/min) 108  -EJ       Row Name 10/25/24 1252          Positioning and Restraints    Pre-Treatment Position in bed  -EJ     Post Treatment Position bed  -EJ     In Bed sitting EOB;call light within reach;encouraged to call for assist;notified nsg;patient within staff view;with family/caregiver  -EJ               User Key  (r) = Recorded By, (t) = Taken By, (c) = Cosigned By      Initials Name Provider Type    Taylor Holland, PT Physical Therapist                   Outcome Measures       Row Name 10/25/24 1302 10/25/24 0735       How much help from another person do you currently need...    Turning from your back to your side while in flat bed without using bedrails? 4  -EJ 4  -KR    Moving from lying on back to sitting on the side of a flat bed without bedrails? 3  -EJ 4  -KR    Moving to and from a bed to a chair (including a wheelchair)? 3  -EJ 3  -KR    Standing up from a chair using your arms (e.g., wheelchair, bedside chair)? 3  -EJ 3  -KR    Climbing 3-5 steps with a railing? 3  -EJ 3  -KR    To walk in hospital room? 3  -EJ 3  -KR    AM-PAC 6 Clicks Score (PT) 19  -EJ 20  -KR    Highest Level of Mobility Goal 6 --> Walk 10 steps or more  -EJ 6 --> Walk 10 steps or more  -KR      Row Name 10/25/24 1302          Functional Assessment    Outcome Measure Options AM-PAC 6 Clicks Basic Mobility (PT)  -EJ               User Key  (r) = Recorded By, (t) = Taken By, (c) = Cosigned By      Initials Name Provider Type    Taylor Holland, PT Physical Therapist    Felicitas Gibbons, RN Registered Nurse                                 Physical Therapy Education       Title: PT OT SLP Therapies (In Progress)       Topic: Physical Therapy (In Progress)       Point: Mobility training (Done)       Learning  Progress Summary            Patient Acceptance, E, VU by ANNABELLA at 10/25/2024 1303                      Point: Home exercise program (Not Started)       Learner Progress:  Not documented in this visit.              Point: Body mechanics (Done)       Learning Progress Summary            Patient Acceptance, E, VU by ANNABELLA at 10/25/2024 1303                      Point: Precautions (Done)       Learning Progress Summary            Patient Acceptance, E, VU by ANNABELLA at 10/25/2024 1303                                      User Key       Initials Effective Dates Name Provider Type Discipline     06/03/24 -  Taylor Subramanian, PT Physical Therapist PT                  PT Recommendation and Plan  Planned Therapy Interventions (PT): balance training, bed mobility training, gait training, postural re-education, patient/family education, neuromuscular re-education, stair training, strengthening, transfer training  Outcome Evaluation: Patient is a 79 y/o F who was admitted to [facility] on 10/25/24 with c/o headache on L side (temporal region that refers into her gums).  Reports to also have had blurry vision in her left eye - has since resolved.  Pt reports left hip pain as well that has worsened.  Pt's sister is currently in the hospital, and she has been doing a lot more walking in/out of the hospital.  She thinks this is attributing to some of her pain/issues.  When pt came to ED her BP was significantly elevated and classified as a hypertensive emergency (257/111). It has since come down.  Pt is still undergoing further assessment.  CT head performed and was negative, and L hip x-ray was performed showing severe left and moderate right hip joint degeneration.  PMHx includes diabetes mellitus, history of PE, HLD, HTN, CONNOR, GERD, osteoarthritis.  At baseline, pt is independent and uses a rollator to ambulate with.  She reports she has 1 ANDREEA a [facility].  She and her sister take turns caring for one another.  Pt also has a niece that assists her as  needed.  Pt denies any recent falls.  Reports she does have a hard time getting around, doing dishes, cleaning, and she is interested in obtaining more assistance.  During today's evaluation pt required min A for supine to sit, min A for sit to stand, and CGA for short distance ambulation with RW (forward/back and laterally). Pt declines SNF.  Is interetsed in home health care and possibly getting a caregiver.  At this time, PT is recomending home with assist and home health PT.  Anticipate she will improve during her stay, but PT will continue to follow.     Time Calculation:         PT Charges       Row Name 10/25/24 1303             Time Calculation    Start Time 1051  -EJ      Stop Time 1123  -EJ      Time Calculation (min) 32 min  -EJ      PT Received On 10/25/24  -EJ      PT - Next Appointment 10/27/24  -EJ      PT Goal Re-Cert Due Date 11/08/24  -EJ         Time Calculation- PT    Total Timed Code Minutes- PT 0 minute(s)  -EJ                User Key  (r) = Recorded By, (t) = Taken By, (c) = Cosigned By      Initials Name Provider Type     Taylor Subramanian, PT Physical Therapist                  Therapy Charges for Today       Code Description Service Date Service Provider Modifiers Qty    [id number] HC PT EVAL MOD COMPLEXITY 4 10/25/2024 Taylor Subramanian, PT GP 1            PT G-Codes  Outcome Measure Options: AM-PAC 6 Clicks Basic Mobility (PT)  AM-PAC 6 Clicks Score (PT): 19  PT Discharge Summary  Anticipated Discharge Disposition (PT): home with assist, home with home health    Taylor Subramanian, PT  10/25/2024

## 2024-10-25 NOTE — PROGRESS NOTES
"Pharmacy dosing service  Anticoagulant  Warfarin     Subjective:    Grace Renee is a 74 y.o.female being continued on warfarin for History of DVT/PE.    INR Goal: 2 - 3  Home medication?: warfarin 7.5 mg PO daily  Bridge Therapy Present?:  No  Interacting Medications Evaluation (New/Present/Discontinued): n/a  Additional Contributing Factors: hip pain (severe pain/stress in general can increase INR)      Assessment/Plan:    INR is therapeutic today, will continue home regimen.     Continue to monitor and adjust based on INR.         Date 10/25           INR 2.38           Dose 7.5mg               Objective:  [Ht: 167.6 cm (66\"); Wt: (!) 138 kg (304 lb); BMI: Body mass index is 49.07 kg/m².]    Lab Results   Component Value Date    ALBUMIN 3.7 08/21/2024     Lab Results   Component Value Date    INR 2.38 10/25/2024    INR 1.1 08/21/2024    INR 1.98 (H) 11/11/2021    PROTIME 24.3 10/25/2024    PROTIME 12.9 08/21/2024    PROTIME 22.2 (H) 11/11/2021     Lab Results   Component Value Date    HGB 13.5 10/25/2024    HGB 12.3 08/21/2024    HGB 9.6 (L) 11/11/2021     Lab Results   Component Value Date    HCT 44.9 10/25/2024    HCT 42.0 08/21/2024    HCT 29.8 (L) 11/11/2021       Janice Guevara, PharmD  10/25/24 10:07 EDT     "

## 2024-10-25 NOTE — CONSULTS
Name: Grace Renee ADMIT: 10/25/2024   : 1950  PCP: Jani Sun MD    MRN: [id number] LOS: 0 days   AGE/SEX: 74 y.o. female  ROOM:    [facility]      Patient Care Team:  Jani Sun MD as PCP - General (Family Medicine)  Chief Complaint   Patient presents with    Headache       CC: Headache and blurry vision    Subjective     Inpatient Vascular Surgery Consult  Consult performed by: Pattie Munoz APRN  Consult ordered by: Iveth Steele PA-C          History of Present Illness  Grace Renee is a 74 y.o. female with a past medical history of diabetes mellitus, GERD, hypertension, sleep apnea, hyperlipidemia, morbid obesity who presented to [facility] on 10/25/2024 for worsening headache with blurry vision.  The patient reports her headache started Tuesday morning and has been ongoing since that time.  When she was driving to the hospital to visit her sister, she had blurry vision in both eyes, she was able to take her glasses off and this improved.  Her headache remained and she presented to the ER for further evaluation.  Upon arrival, she was noted to have systolic blood pressure over 200.  She was given an antihypertensive and blood pressure came down.  At that point her headache resolved.  She then developed some left-sided jaw pain.  She reports that this subsided on its own.  She was admitted for hypertensive workup and evaluation by cardiology.  Labs were obtained, she had elevated white blood cell count of 12.8, ESR of 106, CRP was normal at 0.36.  Vascular surgery was consulted for temporal lobe biopsy with concerns of giant cell arteritis.  The patient reports longstanding history of hypertension, her antihypertensives are managed by her primary care provider and her medication was lowered recently.  She currently denies a headache or jaw pain.  Her vision is at baseline, she tells me she is legally blind.  She denies any jaw claudication.  She denies  fever or chills.    Review of Systems   Constitutional:  Negative for chills and fever.       Past Medical History:   Diagnosis Date    Diabetes mellitus     Elevated cholesterol     GERD (gastroesophageal reflux disease)     Hypertension     Osteoarthritis of left hip 11/6/2021    Sleep apnea      Past Surgical History:   Procedure Laterality Date    CARDIAC CATHETERIZATION Bilateral 11/2/2021    Procedure: Percutaneous Mechanical Thrombectomy- INARI;  Surgeon: Chris Pritchett MD;  Location:  [facility];  Service: Cardiovascular;  Laterality: Bilateral;    CARDIAC CATHETERIZATION N/A 11/2/2021    Procedure: Right Heart Cath;  Surgeon: Chris Pritchett MD;  Location:  [facility];  Service: Cardiovascular;  Laterality: N/A;    INTERVENTIONAL RADIOLOGY PROCEDURE Bilateral 11/2/2021    Procedure: Pulmonary Angiogram;  Surgeon: Chris Pritchett MD;  Location:  [facility]VAS[facility];  Service: Cardiovascular;  Laterality: Bilateral;     No family history on file.    Social History     Tobacco Use    Smoking status: Never    Smokeless tobacco: Never   Substance Use Topics    Alcohol use: Not Currently    Drug use: Never     Medications Prior to Admission   Medication Sig Dispense Refill Last Dose/Taking    allopurinol (ZYLOPRIM) 100 MG tablet Take 1 tablet by mouth Every Night.   10/24/2024    atorvastatin (LIPITOR) 20 MG tablet Take 1 tablet by mouth Daily.   10/24/2024    calcium carbonate (TUMS) 500 MG chewable tablet Chew 1 tablet 3 (Three) Times a Day As Needed for Indigestion or Heartburn. 90 tablet 3 Taking As Needed    Cholecalciferol (Vitamin D3) 50 MCG capsule Take 1 capsule by mouth Daily.   10/24/2024    furosemide (LASIX) 20 MG tablet Take 1 tablet by mouth Daily. 30 tablet 3 10/24/2024    gabapentin (NEURONTIN) 300 MG capsule Take 1 capsule by mouth 3 (Three) Times a Day As Needed.   Taking As Needed    Insulin NPH Isophane & Regular (HumuLIN 70/30 KwikPen)  (70-30) 100 UNIT/ML suspension pen-injector Inject 50 Units under the skin into the appropriate area as directed Every Morning.   10/24/2024    Insulin NPH Isophane & Regular (HumuLIN 70/30 KwikPen) (70-30) 100 UNIT/ML suspension pen-injector Inject 30 Units under the skin into the appropriate area as directed Every Evening.   10/24/2024    lisinopril (PRINIVIL,ZESTRIL) 5 MG tablet Take 1 tablet by mouth 2 (Two) Times a Day. 180 tablet 3 10/24/2024    omeprazole (priLOSEC) 40 MG capsule Take 1 capsule by mouth Daily.   10/24/2024    potassium chloride (KLOR-CON M10) 10 MEQ CR tablet Take 1 tablet by mouth Daily.   10/24/2024    tiZANidine (ZANAFLEX) 4 MG tablet Take 1 tablet by mouth 3 (Three) Times a Day As Needed for Muscle Spasms.   Taking As Needed    warfarin (COUMADIN) 7.5 MG tablet 1 tablet every day  Indications: DVT/PE (active thrombosis) 30 tablet 3 10/24/2024     insulin lispro, 4-24 Units, Subcutaneous, 4x Daily AC & at Bedtime  lisinopril, 10 mg, Oral, Q24H  [START ON 10/26/2024] methylPREDNISolone sodium succinate, 60 mg, Intravenous, Daily  sodium chloride, 10 mL, Intravenous, Q12H  warfarin, 7.5 mg, Oral, Daily      niCARdipine, 5-15 mg/hr, Last Rate: 12.5 mg/hr (10/25/24 1127)  Pharmacy Consult - Steroid Insulin Protocol,   Pharmacy to dose warfarin,         acetaminophen **OR** acetaminophen **OR** acetaminophen    senna-docusate sodium **AND** polyethylene glycol **AND** bisacodyl **AND** bisacodyl    dextrose    dextrose    dextrose    glucagon (human recombinant)    melatonin    nitroglycerin    ondansetron    oxyCODONE    Pharmacy Consult - Steroid Insulin Protocol    Pharmacy to dose warfarin    [COMPLETED] Insert Peripheral IV **AND** sodium chloride    sodium chloride  Patient has no known allergies.    Objective     Physical Exam:   NAD, alert and oriented  RRR  Lungs clear  Abd soft, benign, obese  Vascular: Palpable bilateral radial pulses  Skin: Warm to touch and dry    Vital Signs and  "Labs:  Vital Signs Patient Vitals for the past 24 hrs:   BP Temp Temp src Pulse Resp SpO2 Height Weight   10/25/24 1004 174/67 -- -- 101 -- 91 % -- --   10/25/24 0952 143/52 -- -- 103 -- 90 % -- --   10/25/24 0941 163/73 -- -- -- -- -- 167.6 cm (66\") (!) 138 kg (304 lb)   10/25/24 0925 (!) 181/75 -- -- -- -- -- -- --   10/25/24 0815 165/70 -- -- -- -- -- -- --   10/25/24 0803 142/65 -- -- 101 -- 91 % -- --   10/25/24 0746 145/70 -- -- 104 -- 92 % -- --   10/25/24 0735 172/80 -- -- 105 -- -- -- --   10/25/24 0702 159/66 -- -- 102 -- -- -- --   10/25/24 0646 (!) 191/77 -- -- 105 -- -- -- --   10/25/24 0616 (!) 187/80 -- -- 96 20 92 % -- --   10/25/24 0549 -- -- -- -- -- 93 % -- --   10/25/24 0546 (!) 190/76 -- -- 97 -- -- -- --   10/25/24 0532 (!) 194/76 -- -- 93 20 93 % -- --   10/25/24 0501 (!) 222/97 -- -- 97 22 98 % -- --   10/25/24 0458 (!) 236/85 -- -- 97 -- 96 % -- --   10/25/24 0448 (!) 260/103 -- -- 88 -- -- -- --   10/25/24 0445 (!) 270/106 -- -- 88 17 -- -- --   10/25/24 0416 (!) 257/111 -- -- 73 16 97 % -- --   10/25/24 0412 (!) 270/112 -- -- 74 18 98 % -- --   10/25/24 0411 (!) 252/117 -- -- 76 18 98 % -- --   10/25/24 0313 (!) 236/92 97.1 °F (36.2 °C) Oral 65 18 98 % -- --   10/25/24 0312 -- -- -- -- -- -- 167.6 cm (66\") (!) 138 kg (304 lb 10.8 oz)     BMI:  Body mass index is 49.07 kg/m².    CBC    Results from last 7 days   Lab Units 10/25/24  0419   WBC 10*3/mm3 12.80*   HEMOGLOBIN g/dL 13.5   PLATELETS 10*3/mm3 242     BMP   Results from last 7 days   Lab Units 10/25/24  0419   SODIUM mmol/L 139   POTASSIUM mmol/L 3.9   CHLORIDE mmol/L 100   CO2 mmol/L 28.3   BUN mg/dL 19   CREATININE mg/dL 1.11*   GLUCOSE mg/dL 145*     HbA1C   Lab Results   Component Value Date    HGBA1C 11.2 (H) 08/21/2024    HGBA1C 9.27 (H) 11/02/2021     Infection     Radiology(recent) XR Hip With or Without Pelvis 2 - 3 View Left    Result Date: 10/25/2024  Impression: Severe left and moderate right hip joint degeneration. " Electronically Signed: Otis Shine MD  10/25/2024 5:28 AM EDT  Workstation ID: [id number]    CT Head Without Contrast    Result Date: 10/25/2024  Impression: No acute intracranial abnormality. Electronically Signed: Otis Shine MD  10/25/2024 4:10 AM EDT  Workstation ID: [id number]     VTE Prophylaxis:  Pharmacologic VTE prophylaxis orders are present.        Active Hospital Problems    Diagnosis  POA    **Hypertensive urgency [I16.0]  Yes      Resolved Hospital Problems   No resolved problems to display.       Assessment & Plan   Assessment / Plan     Hypertensive urgency      74 y.o. female who presents with headache and blurry vision.  She was noted to be in a hypertensive emergency and given antihypertensive medications which improved her blood pressure and her headache.  Cardiology is following and managing this.  Due to her episode of headache, blurry vision and left jaw pain in the setting of leukocytosis and elevated ESR (CRP is normal) one of the differentials is giant cell arteritis.  She has been given steroids for this.  Primary team has requested temporal artery biopsy.  We will schedule this for her likely on Monday or Tuesday.    Thank you for this consultation.         MARIA Otero  [facility] Vascular Surgery  10/25/24   O: [phone]  F: [phone]

## 2024-10-25 NOTE — PLAN OF CARE
Goal Outcome Evaluation:  Plan of Care Reviewed With: patient           Outcome Evaluation: Patient is a 81 y/o F who was admitted to [facility] on 10/25/24 with c/o headache on L side (temporal region that refers into her gums).  Reports to also have had blurry vision in her left eye - has since resolved.  Pt reports left hip pain as well that has worsened.  Pt's sister is currently in the hospital, and she has been doing a lot more walking in/out of the hospital.  She thinks this is attributing to some of her pain/issues.  When pt came to ED her BP was significantly elevated and classified as a hypertensive emergency (257/111). It has since come down.  Pt is still undergoing further assessment.  CT head performed and was negative, and L hip x-ray was performed showing severe left and moderate right hip joint degeneration.  PMHx includes diabetes mellitus, history of PE, HLD, HTN, CONNOR, GERD, osteoarthritis.  At baseline, pt is independent and uses a rollator to ambulate with.  She reports she has 1 ANDREEA a [facility].  She and her sister take turns caring for one another.  Pt also has a niece that assists her as needed.  Pt denies any recent falls.  Reports she does have a hard time getting around, doing dishes, cleaning, and she is interested in obtaining more assistance.  During today's evaluation pt required min A for supine to sit, min A for sit to stand, and CGA for short distance ambulation with RW (forward/back and laterally). Pt declines SNF.  Is interetsed in home health care and possibly getting a caregiver.  At this time, PT is recomending home with assist and home health PT.  Anticipate she will improve during her stay, but PT will continue to follow.    Anticipated Discharge Disposition (PT): home with assist, home with home health

## 2024-10-25 NOTE — ED PROVIDER NOTES
Subjective   History of Present Illness  74-year-old female presents with headache.  She states she was asleep and woke up with headache.  She states she had headache 2 days ago.  She states she fell like she has some blurry vision on the way to the hospital.  She has had no nausea vomiting.  She describes the pain in left temporal area.  Reports she was even having some pain in her gums.  She has no neck pain.  She has had no nausea vomiting.  No chest pain or shortness of breath.  She has had no complaints of numbness weakness paresthesia of extremities.  Review of Systems    Past Medical History:   Diagnosis Date    Diabetes mellitus     Elevated cholesterol     GERD (gastroesophageal reflux disease)     Hypertension     Osteoarthritis of left hip 11/6/2021    Sleep apnea        No Known Allergies    Past Surgical History:   Procedure Laterality Date    CARDIAC CATHETERIZATION Bilateral 11/2/2021    Procedure: Percutaneous Mechanical Thrombectomy- INARI;  Surgeon: Chris Pritchett MD;  Location: [facility] CATH INVASIVE LOCATION;  Service: Cardiovascular;  Laterality: Bilateral;    CARDIAC CATHETERIZATION N/A 11/2/2021    Procedure: Right Heart Cath;  Surgeon: Chris Pritchett MD;  Location: [facility] CATH INVASIVE LOCATION;  Service: Cardiovascular;  Laterality: N/A;    INTERVENTIONAL RADIOLOGY PROCEDURE Bilateral 11/2/2021    Procedure: Pulmonary Angiogram;  Surgeon: Chris Pritchett MD;  Location: [facility] CATH INVASIVE LOCATION;  Service: Cardiovascular;  Laterality: Bilateral;       No family history on file.    Social History     Socioeconomic History    Marital status:    Tobacco Use    Smoking status: Never    Smokeless tobacco: Never   Substance and Sexual Activity    Alcohol use: Not Currently    Drug use: Never    Sexual activity: Not Currently     Prior to Admission medications    Medication Sig Start Date End Date Taking? Authorizing Provider   allopurinol (ZYLOPRIM) 100 MG tablet Take 100 mg by  mouth Every Night.    Gibran Simpson MD   amLODIPine (NORVASC) 10 MG tablet Take 10 mg by mouth every night at bedtime.    iGbran Simpson MD   calcium carbonate (TUMS) 500 MG chewable tablet Chew 1 tablet 3 (Three) Times a Day As Needed for Indigestion or Heartburn. 11/10/21   Laci Gordon MD   cholecalciferol (VITAMIN D3) 25 MCG (1000 UT) tablet Take 2,000 Units by mouth Daily.    Gibran Simpson MD   furosemide (LASIX) 20 MG tablet Take 1 tablet by mouth Daily. 11/11/21   Laci Gordon MD   gabapentin (NEURONTIN) 300 MG capsule Take 1 capsule by mouth 2 (Two) Times a Day. 11/10/21   Laci Gordon MD   insulin NPH-insulin regular (humuLIN 70/30,novoLIN 70/30) (70-30) 100 UNIT/ML injection Inject 50 Units under the skin into the appropriate area as directed Every Morning.    Gibran Simpson MD   insulin NPH-insulin regular (humuLIN 70/30,novoLIN 70/30) (70-30) 100 UNIT/ML injection Inject 30 Units under the skin into the appropriate area as directed Every Night.    Gibran Simspon MD   lisinopril (PRINIVIL,ZESTRIL) 5 MG tablet Take 1 tablet by mouth 2 (Two) Times a Day. 2/2/22   Chris Pritchett MD   omeprazole (priLOSEC) 40 MG capsule Take 40 mg by mouth Daily. 1/1/22   Gibran Simpson MD   ursodiol (ACTIGALL) 300 MG capsule Take 500 mg by mouth 2 (Two) Times a Day.    Gibran Simpson MD   warfarin (COUMADIN) 7.5 MG tablet 1 tablet every day  Indications: DVT/PE (active thrombosis) 11/10/21   Laci Gordon MD           Objective   Physical Exam  73-year-old female awake alert.  Generally overweight.  Pupils equal round react light production work muscle intact she describes the pain as the left temporal area.  Neck supple without meningismus chest clear equal breath sounds cardiovascular regular in rhythm abdomen soft nontender examination extremities complains of some pain left leg due to arthritis which is not new.  Neurologic exam without focal  findings noted.  Hands without pronator drift or nose intact speech clear motor sensory grossly intact to extremities.  Procedures           ED Course      Results for orders placed or performed during the hospital encounter of 10/25/24   POC Glucose Once    Collection Time: 10/25/24  3:32 AM    Specimen: Blood   Result Value Ref Range    Glucose 138 (H) 70 - 105 mg/dL   ECG 12 Lead Other; Headache    Collection Time: 10/25/24  3:34 AM   Result Value Ref Range    QT Interval 440 ms    QTC Interval 482 ms   Basic Metabolic Panel    Collection Time: 10/25/24  4:19 AM    Specimen: Blood   Result Value Ref Range    Glucose 145 (H) 65 - 99 mg/dL    BUN 19 8 - 23 mg/dL    Creatinine 1.11 (H) 0.57 - 1.00 mg/dL    Sodium 139 136 - 145 mmol/L    Potassium 3.9 3.5 - 5.2 mmol/L    Chloride 100 98 - 107 mmol/L    CO2 28.3 22.0 - 29.0 mmol/L    Calcium 9.7 8.6 - 10.5 mg/dL    BUN/Creatinine Ratio 17.1 7.0 - 25.0    Anion Gap 10.7 5.0 - 15.0 mmol/L    eGFR 52.6 (L) >60.0 mL/min/1.73   Sedimentation Rate    Collection Time: 10/25/24  4:19 AM    Specimen: Blood   Result Value Ref Range    Sed Rate 106 (H) 0 - 30 mm/hr   Single High Sensitivity Troponin T    Collection Time: 10/25/24  4:19 AM    Specimen: Blood   Result Value Ref Range    HS Troponin T 17 (H) <14 ng/L   Protime-INR    Collection Time: 10/25/24  4:19 AM    Specimen: Blood   Result Value Ref Range    Protime 24.3 19.4 - 28.5 Seconds    INR 2.38 2.00 - 3.00   CBC Auto Differential    Collection Time: 10/25/24  4:19 AM    Specimen: Blood   Result Value Ref Range    WBC 12.80 (H) 3.40 - 10.80 10*3/mm3    RBC 5.03 3.77 - 5.28 10*6/mm3    Hemoglobin 13.5 12.0 - 15.9 g/dL    Hematocrit 44.9 34.0 - 46.6 %    MCV 89.3 79.0 - 97.0 fL    MCH 26.8 26.6 - 33.0 pg    MCHC 30.1 (L) 31.5 - 35.7 g/dL    RDW 14.2 12.3 - 15.4 %    RDW-SD 45.8 37.0 - 54.0 fl    MPV 11.4 6.0 - 12.0 fL    Platelets 242 140 - 450 10*3/mm3    Neutrophil % 40.8 (L) 42.7 - 76.0 %    Lymphocyte % 45.4 (H) 19.6  - 45.3 %    Monocyte % 7.7 5.0 - 12.0 %    Eosinophil % 4.8 0.3 - 6.2 %    Basophil % 1.1 0.0 - 1.5 %    Immature Grans % 0.2 0.0 - 0.5 %    Neutrophils, Absolute 5.22 1.70 - 7.00 10*3/mm3    Lymphocytes, Absolute 5.81 (H) 0.70 - 3.10 10*3/mm3    Monocytes, Absolute 0.99 (H) 0.10 - 0.90 10*3/mm3    Eosinophils, Absolute 0.61 (H) 0.00 - 0.40 10*3/mm3    Basophils, Absolute 0.14 0.00 - 0.20 10*3/mm3    Immature Grans, Absolute 0.03 0.00 - 0.05 10*3/mm3    nRBC 0.0 0.0 - 0.2 /100 WBC   Gold Top - SST    Collection Time: 10/25/24  4:19 AM   Result Value Ref Range    Extra Tube Hold for add-ons.      XR Hip With or Without Pelvis 2 - 3 View Left   Final Result   Impression:   Severe left and moderate right hip joint degeneration.            Electronically Signed: Otis Shine MD     10/25/2024 5:28 AM EDT     Workstation ID: [id number]      CT Head Without Contrast   Final Result   Impression:   No acute intracranial abnormality.            Electronically Signed: Otis Shine MD     10/25/2024 4:10 AM EDT     Workstation ID: [id number]         Medications   sodium chloride 0.9 % flush 10 mL (has no administration in time range)   niCARdipine (CARDENE) 25mg in 250mL NS infusion (15 mg/hr Intravenous Rate/Dose Change 10/25/24 0459)   Pharmacy to dose warfarin (has no administration in time range)   nitroglycerin (NITROSTAT) SL tablet 0.4 mg (has no administration in time range)   sodium chloride 0.9 % flush 10 mL (has no administration in time range)   sodium chloride 0.9 % flush 10 mL (has no administration in time range)   acetaminophen (TYLENOL) tablet 650 mg (has no administration in time range)     Or   acetaminophen (TYLENOL) 160 MG/5ML oral solution 650 mg (has no administration in time range)     Or   acetaminophen (TYLENOL) suppository 650 mg (has no administration in time range)   ondansetron (ZOFRAN) injection 4 mg (has no administration in time range)   melatonin tablet 5 mg (has no administration in time  range)   dextrose (GLUTOSE) oral gel 15 g (has no administration in time range)   dextrose (D50W) (25 g/50 mL) IV injection 25 g (has no administration in time range)   glucagon (GLUCAGEN) injection 1 mg (has no administration in time range)   insulin lispro (HUMALOG/ADMELOG) injection 2-9 Units (has no administration in time range)   sennosides-docusate (PERICOLACE) 8.6-50 MG per tablet 2 tablet (has no administration in time range)     And   polyethylene glycol (MIRALAX) packet 17 g (has no administration in time range)     And   bisacodyl (DULCOLAX) EC tablet 5 mg (has no administration in time range)     And   bisacodyl (DULCOLAX) suppository 10 mg (has no administration in time range)   diphenhydrAMINE (BENADRYL) injection 25 mg (25 mg Intravenous Given 10/25/24 8395)   prochlorperazine (COMPAZINE) injection 10 mg (10 mg Intravenous Given 10/25/24 3235)   HYDROmorphone (DILAUDID) injection 0.5 mg (0.5 mg Intravenous Given 10/25/24 6059)   methylPREDNISolone sodium succinate (SOLU-Medrol) injection 60 mg (60 mg Intravenous Given 10/25/24 7505)   HYDROmorphone (DILAUDID) injection 0.5 mg (0.5 mg Intravenous Given 10/25/24 5754)     Temp:  [97.1 °F (36.2 °C)] 97.1 °F (36.2 °C)  Heart Rate:  [65-97] 93  Resp:  [16-22] 20  BP: (194-270)/() 194/76                                            Medical Decision Making    Chart review: Patient was noted to have right heart catheterization with aspiration thrombectomy of pulmonary arteries November 2021.  Comorbidity: As per past history   Differential: Temporal arteritis, subarachnoid hemorrhage, intracranial hemorrhage, hypertensive urgency,  My EKG interpretation: Sinus rhythm rate of 72.  No previous for comparison.  Lab: White count 12.8 with hemin 13.5 platelet count of 242 with 40 segs 45 lymphs.  Basic metabolic panel glucose 145 BUN 19 creatinine 1.11 INR is therapeutic at 2.38 troponin mild elevation 17 sed rate elevated at 106.  My Radiology review and  interpretation: CT scan of head shows no  intracranial hemorrhage mass or stroke.  Left hip reveals severe left moderate right hip degenerative change.  There is complete loss of superior joint space and subcortical cystic degeneration of the left hip.  Discussion/treatment: Patient had IV placed.  Was given diphenhydramine and prochlorperazine for headache.  Repeat evaluation she reports her headache is better but is now complaining of pain in her left hip.  She states she has had pain in her hip for years but it is now severe.  She reports no fall or injury.  She is having no abdominal pain or back pain on repeat evaluation.  Pain is localized just to her hip with palpation or movement.  She was given Dilaudid for this.  Patient was noted to have marked elevation of her systolic blood pressure and was started on Cardene.  She is currently on Cardene at 15 mg/h.  Her systolic blood pressure has improved to 194 she was also given Solu-Medrol due to sed rate 106 and giant cell arteritis pain in her differential.  Patient was discussed with the nurse practitioner the hospitalist.  Will be admitted to their service for continued care further evaluation as needed patient critical care time 35 minutes independent of procedure  Patient was evaluated using appropriate PPE    Final diagnoses:   Hypertensive emergency   Acute nonintractable headache, unspecified headache type   Left hip pain   Elevated sed rate       ED Disposition  ED Disposition       ED Disposition   Decision to Admit    Condition   --    Comment   Level of Care: Progressive Care [20]   Admitting Physician: LLANES ALVAREZ, CARLOS [[id number]]                 No follow-up provider specified.       Medication List      No changes were made to your prescriptions during this visit.            Kenan Gilbert MD  10/25/24 0540       Kenan Gilbert MD  10/25/24 0563

## 2024-10-25 NOTE — Clinical Note
Level of Care: Progressive Care [20]   Diagnosis: Hypertensive urgency [004423]   Certification: I Certify That Inpatient Hospital Services Are Medically Necessary For Greater Than 2 Midnights

## 2024-10-25 NOTE — CONSULTS
Cardiology Consult Note    Patient Identification:  Name: Grace Renee  Age: 74 y.o.  Sex: female  :  1950  MRN: [id number]             Requesting Physician :  MARIA Garces hospitalist     Reason for Consultation / Chief Complaint :   Hypertensive urgency    History of Present Illness:      Ms. Grace Renee has a PMH of     - Hypertension   - Dyslipidemia  - diabetes   - Pulmonary emboli s/p mechanical thrombectomy 2021  - CONNOR   - GERD  - Arthritis   - Obesity, BMI 49   - no allergies       Presented to the ED with headache and jaw pain.  Patient reports that she was here in the hospital spending the night with her sister when she woke up around 2 AM with severe headache as well as jaw pain and hip pain.  Patient was significantly hypertensive and placed on cardene drip.  She also notes that she has some broken teeth in her mouth and has had problems with gum swelling in the past, she thought that maybe she was getting an infection.  She reports that her headache and jaw pain have resolved and now she still has significant pain.   She denies any chest pain or shortness of breath.  She does note that on the way to the hospital to visit her sister that she had a hard time seeing her vision was blurry, which is now resolved.        Labs in the ED showed HS troponin 17, creatinine 1.11, WBC 12.8 sed rate 106 otherwise unremarkable.  EKG normal sinus rhythm. CT head negative     Echocardiogram pending patient remains on Cardene drip  Will check blood cultures  Restart home lisinopril monitor blood pressure closely and off Cardene drip      Further assessment and plan per Dr. Jiménez  Electronically signed by MARIA Estevez, 10/25/24, 12:01 PM EDT.    Cardiology attending addendum :    I have personally performed a face-to-face diagnostic evaluation, physical exam and reviewed data on this patient.  I have reviewed documentation done by me and nurse practitioner  and corrected as needed.   And agree with the different components of documentation.Greater than 50% of the time spent in the care of this patient was provided by attending consultant/me.         Assessment:  :    Hypertensive emergency  Elevated troponin at 17  Dyslipidemia  Diabetes, with A1c of 11.2  History of PE and anticoagulation, INR therapeutic at 2.38  Obesity with BMI over 49      Recommendations / Plan:        Patient presented with headache while visiting family in the hospital was found to have significantly elevated blood pressure.  Patient is currently admitted to PCU in telemetry  Will monitor rhythm  Patient is on IV Cardene.  Will continue IV Cardene and monitor closely by monitoring hemodynamics, rhythm, labs.  Continue lisinopril and add low-dose beta-blockers 50 twice daily.  Check echocardiogram.  Patient's troponin is mildly elevated at 17.  Will repeat troponin.  EKG 10/25/2024 reviewed/interpreted by me reveals sinus rhythm with a rate of 72 bpm  Will schedule stress test to guide therapy.  Patient would benefit from diabetes control  Will defer evaluation and therapy of leukocytosis to primary team.  Patient had CT head 10/25/2024 which was negative.  Does not have any further HA.  Patient's sed rate is elevated at 106, but HS troponin is normal at 0.36.  Appreciated hospitalist input being worked up for temporal arteritis..             Diagnosis Plan   1. Hypertensive emergency        2. Acute nonintractable headache, unspecified headache type  Case request    Case request      3. Left hip pain        4. Elevated sed rate  Case request    Case request                 Past Medical History:  Past Medical History:   Diagnosis Date    Diabetes mellitus     Elevated cholesterol     GERD (gastroesophageal reflux disease)     Hypertension     Osteoarthritis of left hip 11/6/2021    Sleep apnea      Past Surgical History:  Past Surgical History:   Procedure Laterality Date    CARDIAC CATHETERIZATION Bilateral 11/2/2021     Procedure: Percutaneous Mechanical Thrombectomy- [facility];  Surgeon: Chris Pritchett MD;  Location:  [facility];  Service: Cardiovascular;  Laterality: Bilateral;    CARDIAC CATHETERIZATION N/A 11/2/2021    Procedure: Right Heart Cath;  Surgeon: Chris Pritchett MD;  Location:  [facility];  Service: Cardiovascular;  Laterality: N/A;    INTERVENTIONAL RADIOLOGY PROCEDURE Bilateral 11/2/2021    Procedure: Pulmonary Angiogram;  Surgeon: Chris Pritchett MD;  Location:  [facility];  Service: Cardiovascular;  Laterality: Bilateral;      Allergies:  No Known Allergies  Home Meds:  Medications Prior to Admission   Medication Sig Dispense Refill Last Dose/Taking    allopurinol (ZYLOPRIM) 100 MG tablet Take 1 tablet by mouth Every Night.   10/24/2024    atorvastatin (LIPITOR) 20 MG tablet Take 1 tablet by mouth Daily.   10/24/2024    calcium carbonate (TUMS) 500 MG chewable tablet Chew 1 tablet 3 (Three) Times a Day As Needed for Indigestion or Heartburn. 90 tablet 3 Taking As Needed    Cholecalciferol (Vitamin D3) 50 MCG capsule Take 1 capsule by mouth Daily.   10/24/2024    furosemide (LASIX) 20 MG tablet Take 1 tablet by mouth Daily. 30 tablet 3 10/24/2024    gabapentin (NEURONTIN) 300 MG capsule Take 1 capsule by mouth 3 (Three) Times a Day As Needed.   Taking As Needed    Insulin NPH Isophane & Regular (HumuLIN 70/30 KwikPen) (70-30) 100 UNIT/ML suspension pen-injector Inject 50 Units under the skin into the appropriate area as directed Every Morning.   10/24/2024    Insulin NPH Isophane & Regular (HumuLIN 70/30 KwikPen) (70-30) 100 UNIT/ML suspension pen-injector Inject 30 Units under the skin into the appropriate area as directed Every Evening.   10/24/2024    lisinopril (PRINIVIL,ZESTRIL) 5 MG tablet Take 1 tablet by mouth 2 (Two) Times a Day. 180 tablet 3 10/24/2024    omeprazole (priLOSEC) 40 MG capsule Take 1 capsule by mouth Daily.   10/24/2024     potassium chloride (KLOR-CON M10) 10 MEQ CR tablet Take 1 tablet by mouth Daily.   10/24/2024    tiZANidine (ZANAFLEX) 4 MG tablet Take 1 tablet by mouth 3 (Three) Times a Day As Needed for Muscle Spasms.   Taking As Needed    warfarin (COUMADIN) 7.5 MG tablet 1 tablet every day  Indications: DVT/PE (active thrombosis) 30 tablet 3 10/24/2024    metoclopramide (REGLAN) 10 MG tablet Take 1 tablet by mouth Daily.        Current Meds:     Current Facility-Administered Medications:     acetaminophen (TYLENOL) tablet 650 mg, 650 mg, Oral, Q4H PRN, 650 mg at 10/25/24 1040 **OR** acetaminophen (TYLENOL) 160 MG/5ML oral solution 650 mg, 650 mg, Oral, Q4H PRN **OR** acetaminophen (TYLENOL) suppository 650 mg, 650 mg, Rectal, Q4H PRN, Mirella Pop, APRN    allopurinol (ZYLOPRIM) tablet 100 mg, 100 mg, Oral, Nightly, Iveth Steele PA-C    atorvastatin (LIPITOR) tablet 20 mg, 20 mg, Oral, Daily, Iveth Steele PA-C    sennosides-docusate (PERICOLACE) 8.6-50 MG per tablet 2 tablet, 2 tablet, Oral, BID PRN **AND** polyethylene glycol (MIRALAX) packet 17 g, 17 g, Oral, Daily PRN **AND** bisacodyl (DULCOLAX) EC tablet 5 mg, 5 mg, Oral, Daily PRN **AND** bisacodyl (DULCOLAX) suppository 10 mg, 10 mg, Rectal, Daily PRN, Mirella Pop, APRN    calcium carbonate (TUMS) chewable tablet 500 mg (200 mg elemental), 1 tablet, Oral, TID PRN, Iveth Steele PA-C    dextrose (D50W) (25 g/50 mL) IV injection 25 g, 25 g, Intravenous, Q15 Min PRN, Mirella Pop, APRKANDY    dextrose (D50W) (25 g/50 mL) IV injection 25 g, 25 g, Intravenous, Q15 Min PRN, Mirella Pop APRN    dextrose (GLUTOSE) oral gel 15 g, 15 g, Oral, Q15 Min PRN, Mirella Pop APRN    furosemide (LASIX) tablet 20 mg, 20 mg, Oral, Daily, Iveth Steele PA-C, 20 mg at 10/25/24 1347    gabapentin (NEURONTIN) capsule 300 mg, 300 mg, Oral, TID PRN, Iveth Steele PA-C    glucagon (GLUCAGEN) injection 1 mg, 1 mg, Intramuscular, Q15 Min PRN, Mirella Pop,  MARIA    insulin lispro (HUMALOG/ADMELOG) injection 10 Units, 10 Units, Subcutaneous, TID With Meals, Iveth Steele PA-C, 10 Units at 10/25/24 1909    insulin lispro (HUMALOG/ADMELOG) injection 4-24 Units, 4-24 Units, Subcutaneous, 4x Daily AC & at Bedtime, Mirella Pop APRN, 24 Units at 10/25/24 1740    lisinopril (PRINIVIL,ZESTRIL) tablet 5 mg, 5 mg, Oral, BID, OssaiCharanjit MD, 5 mg at 10/25/24 1319    melatonin tablet 5 mg, 5 mg, Oral, Nightly PRN, Mirella Pop APRN    [START ON 10/26/2024] methylPREDNISolone sodium succinate (SOLU-Medrol) injection 60 mg, 60 mg, Intravenous, Daily, Iveth Steele PA-C    metoprolol succinate XL (TOPROL-XL) 24 hr tablet 50 mg, 50 mg, Oral, Q24H, Michael Jiménez MD, 50 mg at 10/25/24 1740    niCARdipine (CARDENE) 25mg in 250mL NS infusion, 5-15 mg/hr, Intravenous, Titrated, Kenan Gilbert MD, Last Rate: 50 mL/hr at 10/25/24 1909, 5 mg/hr at 10/25/24 1909    nitroglycerin (NITROSTAT) SL tablet 0.4 mg, 0.4 mg, Sublingual, Q5 Min PRN, Mirella Pop APRN    ondansetron (ZOFRAN) injection 4 mg, 4 mg, Intravenous, Q6H PRN, Mirella Pop APRN    oxyCODONE (ROXICODONE) immediate release tablet 5 mg, 5 mg, Oral, Q6H PRN, Iveth Steele PA-C    [START ON 10/26/2024] pantoprazole (PROTONIX) EC tablet 40 mg, 40 mg, Oral, Q AM, Iveth Steele PA-C    Pharmacy Consult - Steroid Insulin Protocol, , Does not apply, Continuous PRN, Almasri, Sawsan, PA-C    Pharmacy to dose warfarin, , Does not apply, Continuous PRN, Mirella Pop, APRN    potassium chloride (K-DUR,KLOR-CON) ER tablet 10 mEq, 10 mEq, Oral, Daily, Iveth Steele PA-C, 10 mEq at 10/25/24 1319    [COMPLETED] Insert Peripheral IV, , , Once **AND** sodium chloride 0.9 % flush 10 mL, 10 mL, Intravenous, PRN, Kenan Gilbert MD    sodium chloride 0.9 % flush 10 mL, 10 mL, Intravenous, Q12H, Mirella Pop, APRN, 10 mL at 10/25/24 0926    sodium chloride 0.9 % flush 10 mL, 10 mL,  "Intravenous, PRN, OneidaMirella garcia APRN    tiZANidine (ZANAFLEX) tablet 4 mg, 4 mg, Oral, TID PRN, Iveth Steele PA-C    warfarin (COUMADIN) tablet 7.5 mg, 7.5 mg, Oral, Daily, Iveth Steele PA-C  Social History:   Social History     Tobacco Use    Smoking status: Never    Smokeless tobacco: Never   Substance Use Topics    Alcohol use: Not Currently      Family History:  History reviewed. No pertinent family history.     Review of Systems : Review of Systems   Eyes:  Positive for blurred vision.   Cardiovascular:  Negative for chest pain, dyspnea on exertion, leg swelling and palpitations.   Respiratory:  Negative for shortness of breath.    Musculoskeletal:  Positive for joint pain (Left hip).   Gastrointestinal:  Negative for nausea and vomiting.   Neurological:  Positive for headaches (Left temporal).   All other systems reviewed and are negative.      Constitutional:  Temp:  [97.1 °F (36.2 °C)-97.8 °F (36.6 °C)] 97.7 °F (36.5 °C)  Heart Rate:  [] 80  Resp:  [16-22] 20  BP: (127-270)/() 154/53    Physical Exam   /53   Pulse 80   Temp 97.7 °F (36.5 °C) (Oral)   Resp 20   Ht 167.6 cm (66\")   Wt (!) 138 kg (304 lb)   SpO2 95%   BMI 49.07 kg/m²   Physical Exam  General:  Appears in no acute distress  Eyes: Sclerae are anicteric,  conjunctivae are clear   HEENT:  No JVD. Thyroid not visibly enlarged. No mucosal pallor or cyanosis  Respiratory: Respirations regular and unlabored at rest.  Bilaterally good breath sounds with good air entry in all fields. No crackles, rubs or wheezes auscultated  Cardiovascular: S1,S2 Regular rate and rhythm. No murmur, rub or gallop auscultated. No pretibial pitting edema  Gastrointestinal: Abdomen soft, flat, nontender. Bowel sounds present.  Morbidly obese  Musculoskeletal:  No abnormal movements  Extremities: No digital clubbing or cyanosis  Skin: Color pink. Skin warm and dry to touch. No rashes  No xanthoma  Neuro: Alert and awake, no lateralizing " deficits appreciated    Cardiographics  ECG: EKG tracing was  personally reviewed/interpreted by me  ECG 12 Lead Other; Headache   Final Result   HEART RATE=72  bpm   RR Jaaudusw=253  ms   MD Zhckymjn=893  ms   P Horizontal Axis=-59  deg   P Front Axis=-77  deg   QRSD Interval=89  ms   QT Ygsxbkdr=911  ms   UUvK=312  ms   QRS Axis=23  deg   T Wave Axis=65  deg   - OTHERWISE NORMAL ECG -   Sinus or ectopic atrial rhythm   When compared with ECG of 08-Nov-2021 13:24:37,   Significant change in rhythm   Electronically Signed By: Kenan Gilbert (HANNAH) 2024-10-25 06:45:36   Date and Time of Study:2024-10-25 03:34:34      Telemetry Scan   Final Result      Telemetry Scan   Final Result          Telemetry: sinus rhythm     Echocardiogram:   Results for orders placed during the hospital encounter of 10/25/24    Adult Transthoracic Echo Complete W/ Cont if Necessary Per Protocol    Interpretation Summary    Left ventricular ejection fraction appears to be 61 - 65%.    Left ventricular diastolic function was normal.    The left atrial cavity is mildly dilated.    Mild aortic valve stenosis is present.      Imaging  Chest X-ray:   Imaging Results (Last 24 Hours)       Procedure Component Value Units Date/Time    XR Hip With or Without Pelvis 2 - 3 View Left [[id number]] Collected: 10/25/24 0527     Updated: 10/25/24 0530    Narrative:      XR HIP W OR WO PELVIS 2-3 VIEW LEFT    Date of Exam: 10/25/2024 5:24 AM EDT    Indication: pain    Comparison: 11/5/2021.    Findings:  There is severe joint degeneration at the left hip consisting of osteophytes, subcortical sclerosis, complete loss of superior joint space and subcortical cystic degeneration. There are moderate similar degenerative changes of the right hip. There is no   evidence of a fracture or destructive osseous lesion. Bony pelvis appears intact. There is mild pelvic and trochanteric enthesopathy. There are prominent vascular calcifications.      Impression:       Impression:  Severe left and moderate right hip joint degeneration.        Electronically Signed: Otis Shine MD    10/25/2024 5:28 AM EDT    Workstation ID: [id number]    CT Head Without Contrast [[id number]] Collected: 10/25/24 0409     Updated: 10/25/24 0412    Narrative:      CT HEAD WO CONTRAST    Date of Exam: 10/25/2024 3:45 AM EDT    Indication: Headache.    Comparison: 11/1/2021.    Technique: Axial CT images were obtained of the head without contrast administration.  Coronal reconstructions were performed.  Automated exposure control and iterative reconstruction methods were used.      Findings:  Superficial soft tissues appear within normal limits. The calvarium is intact.  Paranasal sinuses and mastoid air cells appear well aerated.  Orbits are unremarkable.  There is no acute intracranial hemorrhage.  No mass effect or midline shift.  No   abnormal extra-axial collections.  Gray-white differentiation is within normal limits.  There are no focal hypoattenuating lesions.  Ventricular size and configuration is normal for age.      Impression:      Impression:  No acute intracranial abnormality.        Electronically Signed: Otis Shine MD    10/25/2024 4:10 AM EDT    Workstation ID: [id number]            Lab Review: I have reviewed the labs  Results from last 7 days   Lab Units 10/25/24  0419   HSTROP T ng/L 17*         Results from last 7 days   Lab Units 10/25/24  0419   SODIUM mmol/L 139   POTASSIUM mmol/L 3.9   BUN mg/dL 19   CREATININE mg/dL 1.11*   CALCIUM mg/dL 9.7         Results from last 7 days   Lab Units 10/25/24  0419   PROBNP pg/mL 153.0     Results from last 7 days   Lab Units 10/25/24  0419   WBC 10*3/mm3 12.80*   HEMOGLOBIN g/dL 13.5   HEMATOCRIT % 44.9   PLATELETS 10*3/mm3 242     Results from last 7 days   Lab Units 10/25/24  0419   INR  2.38             Michael Jiménez MD  10/25/2024, 19:19 EDT      EMR Dragon/Transcription:   Dictated utilizing Dragon dictation

## 2024-10-25 NOTE — CASE MANAGEMENT/SOCIAL WORK
Continued Stay Note   [facility]     Patient Name: Grace Renee  MRN: [id number]  Today's Date: 10/25/2024    Admit Date: 10/25/2024    Plan: Return home   Discharge Plan       Row Name 10/25/24 1313       Plan    Plan Return home    Plan Comments DC Barriers:  Cardene drip, vascular consulted, blood cultures ordered, echo pending             Taisha Ocampo, RN      [facility]  Phone # [phone]  Fax # [phone]

## 2024-10-26 ENCOUNTER — APPOINTMENT (OUTPATIENT)
Dept: NUCLEAR MEDICINE | Facility: HOSPITAL | Age: 74
DRG: 305 | End: 2024-10-26
Payer: COMMERCIAL

## 2024-10-26 LAB
ANION GAP SERPL CALCULATED.3IONS-SCNC: 10.8 MMOL/L (ref 5–15)
BH CV REST NUCLEAR ISOTOPE DOSE: 11 MCI
BH CV STRESS BP STAGE 3: NORMAL
BH CV STRESS COMMENTS STAGE 1: NORMAL
BH CV STRESS DOSE REGADENOSON STAGE 1: 0.4
BH CV STRESS DURATION MIN STAGE 1: 0
BH CV STRESS DURATION SEC STAGE 1: 10
BH CV STRESS DURATION SEC STAGE 2: 0
BH CV STRESS HR STAGE 1: 74
BH CV STRESS HR STAGE 2: 81
BH CV STRESS HR STAGE 3: 81
BH CV STRESS HR STAGE 4: 77
BH CV STRESS NUCLEAR ISOTOPE DOSE: 30.7 MCI
BH CV STRESS PROTOCOL 1: NORMAL
BH CV STRESS RECOVERY BP: NORMAL MMHG
BH CV STRESS RECOVERY HR: 78 BPM
BH CV STRESS STAGE 1: 1
BH CV STRESS STAGE 2: 2
BH CV STRESS STAGE 3: 3
BH CV STRESS STAGE 4: 4
BUN SERPL-MCNC: 29 MG/DL (ref 8–23)
BUN/CREAT SERPL: 22.8 (ref 7–25)
CALCIUM SPEC-SCNC: 8.8 MG/DL (ref 8.6–10.5)
CHLORIDE SERPL-SCNC: 103 MMOL/L (ref 98–107)
CO2 SERPL-SCNC: 24.2 MMOL/L (ref 22–29)
CREAT SERPL-MCNC: 1.27 MG/DL (ref 0.57–1)
DEPRECATED RDW RBC AUTO: 44.4 FL (ref 37–54)
EGFRCR SERPLBLD CKD-EPI 2021: 44.7 ML/MIN/1.73
ERYTHROCYTE [DISTWIDTH] IN BLOOD BY AUTOMATED COUNT: 13.8 % (ref 12.3–15.4)
GEN 5 2HR TROPONIN T REFLEX: 33 NG/L
GLUCOSE BLDC GLUCOMTR-MCNC: 315 MG/DL (ref 70–105)
GLUCOSE BLDC GLUCOMTR-MCNC: 321 MG/DL (ref 70–105)
GLUCOSE BLDC GLUCOMTR-MCNC: 371 MG/DL (ref 70–105)
GLUCOSE BLDC GLUCOMTR-MCNC: 414 MG/DL (ref 70–105)
GLUCOSE BLDC GLUCOMTR-MCNC: 436 MG/DL (ref 70–105)
GLUCOSE SERPL-MCNC: 151 MG/DL (ref 65–99)
HCT VFR BLD AUTO: 36.5 % (ref 34–46.6)
HGB BLD-MCNC: 11.4 G/DL (ref 12–15.9)
LV EF NUC BP: 61 %
MAXIMAL PREDICTED HEART RATE: 147 BPM
MCH RBC QN AUTO: 27.1 PG (ref 26.6–33)
MCHC RBC AUTO-ENTMCNC: 31.2 G/DL (ref 31.5–35.7)
MCV RBC AUTO: 86.7 FL (ref 79–97)
PERCENT MAX PREDICTED HR: 55.1 %
PLATELET # BLD AUTO: 259 10*3/MM3 (ref 140–450)
PMV BLD AUTO: 11.2 FL (ref 6–12)
POTASSIUM SERPL-SCNC: 4.6 MMOL/L (ref 3.5–5.2)
RBC # BLD AUTO: 4.21 10*6/MM3 (ref 3.77–5.28)
SODIUM SERPL-SCNC: 138 MMOL/L (ref 136–145)
STRESS BASELINE BP: NORMAL MMHG
STRESS BASELINE HR: 76 BPM
STRESS PERCENT HR: 65 %
STRESS POST PEAK BP: NORMAL MMHG
STRESS POST PEAK HR: 81 BPM
STRESS TARGET HR: 125 BPM
TROPONIN T DELTA: -6 NG/L
TROPONIN T SERPL HS-MCNC: 39 NG/L
WBC NRBC COR # BLD AUTO: 15.7 10*3/MM3 (ref 3.4–10.8)

## 2024-10-26 PROCEDURE — 25010000002 METHYLPREDNISOLONE PER 125 MG

## 2024-10-26 PROCEDURE — 63710000001 INSULIN LISPRO (HUMAN) PER 5 UNITS: Performed by: STUDENT IN AN ORGANIZED HEALTH CARE EDUCATION/TRAINING PROGRAM

## 2024-10-26 PROCEDURE — 63710000001 INSULIN REGULAR HUMAN PER 5 UNITS

## 2024-10-26 PROCEDURE — 97112 NEUROMUSCULAR REEDUCATION: CPT

## 2024-10-26 PROCEDURE — 99232 SBSQ HOSP IP/OBS MODERATE 35: CPT | Performed by: INTERNAL MEDICINE

## 2024-10-26 PROCEDURE — 82948 REAGENT STRIP/BLOOD GLUCOSE: CPT

## 2024-10-26 PROCEDURE — 99232 SBSQ HOSP IP/OBS MODERATE 35: CPT | Performed by: SURGERY

## 2024-10-26 PROCEDURE — 25010000002 REGADENOSON 0.4 MG/5ML SOLUTION: Performed by: STUDENT IN AN ORGANIZED HEALTH CARE EDUCATION/TRAINING PROGRAM

## 2024-10-26 PROCEDURE — 78452 HT MUSCLE IMAGE SPECT MULT: CPT | Performed by: INTERNAL MEDICINE

## 2024-10-26 PROCEDURE — 84484 ASSAY OF TROPONIN QUANT: CPT | Performed by: NURSE PRACTITIONER

## 2024-10-26 PROCEDURE — 0 TECHNETIUM SESTAMIBI: Performed by: STUDENT IN AN ORGANIZED HEALTH CARE EDUCATION/TRAINING PROGRAM

## 2024-10-26 PROCEDURE — 97116 GAIT TRAINING THERAPY: CPT

## 2024-10-26 PROCEDURE — 93018 CV STRESS TEST I&R ONLY: CPT | Performed by: INTERNAL MEDICINE

## 2024-10-26 PROCEDURE — 93017 CV STRESS TEST TRACING ONLY: CPT

## 2024-10-26 PROCEDURE — 78452 HT MUSCLE IMAGE SPECT MULT: CPT

## 2024-10-26 PROCEDURE — A9500 TC99M SESTAMIBI: HCPCS | Performed by: STUDENT IN AN ORGANIZED HEALTH CARE EDUCATION/TRAINING PROGRAM

## 2024-10-26 PROCEDURE — 87040 BLOOD CULTURE FOR BACTERIA: CPT | Performed by: NURSE PRACTITIONER

## 2024-10-26 PROCEDURE — 99221 1ST HOSP IP/OBS SF/LOW 40: CPT | Performed by: PSYCHIATRY & NEUROLOGY

## 2024-10-26 PROCEDURE — 85027 COMPLETE CBC AUTOMATED: CPT

## 2024-10-26 PROCEDURE — 93016 CV STRESS TEST SUPVJ ONLY: CPT | Performed by: NURSE PRACTITIONER

## 2024-10-26 PROCEDURE — 63710000001 INSULIN GLARGINE PER 5 UNITS: Performed by: STUDENT IN AN ORGANIZED HEALTH CARE EDUCATION/TRAINING PROGRAM

## 2024-10-26 PROCEDURE — 80048 BASIC METABOLIC PNL TOTAL CA: CPT

## 2024-10-26 RX ORDER — IBUPROFEN 600 MG/1
1 TABLET ORAL
Status: DISCONTINUED | OUTPATIENT
Start: 2024-10-26 | End: 2024-10-26

## 2024-10-26 RX ORDER — DEXTROSE MONOHYDRATE 25 G/50ML
10-50 INJECTION, SOLUTION INTRAVENOUS
Status: DISCONTINUED | OUTPATIENT
Start: 2024-10-26 | End: 2024-10-26

## 2024-10-26 RX ORDER — INSULIN LISPRO 100 [IU]/ML
1-200 INJECTION, SOLUTION INTRAVENOUS; SUBCUTANEOUS
Status: DISCONTINUED | OUTPATIENT
Start: 2024-10-26 | End: 2024-10-26

## 2024-10-26 RX ORDER — DEXTROSE MONOHYDRATE 25 G/50ML
25 INJECTION, SOLUTION INTRAVENOUS
Status: DISCONTINUED | OUTPATIENT
Start: 2024-10-26 | End: 2024-10-26

## 2024-10-26 RX ORDER — INSULIN LISPRO 100 [IU]/ML
1-200 INJECTION, SOLUTION INTRAVENOUS; SUBCUTANEOUS AS NEEDED
Status: DISCONTINUED | OUTPATIENT
Start: 2024-10-26 | End: 2024-10-26

## 2024-10-26 RX ORDER — NICOTINE POLACRILEX 4 MG
15 LOZENGE BUCCAL
Status: DISCONTINUED | OUTPATIENT
Start: 2024-10-26 | End: 2024-10-26

## 2024-10-26 RX ORDER — IBUPROFEN 600 MG/1
1 TABLET ORAL
Status: DISCONTINUED | OUTPATIENT
Start: 2024-10-26 | End: 2024-10-28 | Stop reason: HOSPADM

## 2024-10-26 RX ORDER — DEXTROSE MONOHYDRATE 25 G/50ML
25 INJECTION, SOLUTION INTRAVENOUS
Status: DISCONTINUED | OUTPATIENT
Start: 2024-10-26 | End: 2024-10-28 | Stop reason: HOSPADM

## 2024-10-26 RX ORDER — INSULIN LISPRO 100 [IU]/ML
2-7 INJECTION, SOLUTION INTRAVENOUS; SUBCUTANEOUS
Status: DISCONTINUED | OUTPATIENT
Start: 2024-10-26 | End: 2024-10-27

## 2024-10-26 RX ORDER — INSULIN LISPRO 100 [IU]/ML
4-24 INJECTION, SOLUTION INTRAVENOUS; SUBCUTANEOUS
Status: DISCONTINUED | OUTPATIENT
Start: 2024-10-26 | End: 2024-10-26

## 2024-10-26 RX ORDER — NICOTINE POLACRILEX 4 MG
15 LOZENGE BUCCAL
Status: DISCONTINUED | OUTPATIENT
Start: 2024-10-26 | End: 2024-10-28 | Stop reason: HOSPADM

## 2024-10-26 RX ORDER — INSULIN LISPRO 100 [IU]/ML
10 INJECTION, SOLUTION INTRAVENOUS; SUBCUTANEOUS ONCE
Status: DISCONTINUED | OUTPATIENT
Start: 2024-10-26 | End: 2024-10-26

## 2024-10-26 RX ORDER — REGADENOSON 0.08 MG/ML
0.4 INJECTION, SOLUTION INTRAVENOUS
Status: COMPLETED | OUTPATIENT
Start: 2024-10-26 | End: 2024-10-26

## 2024-10-26 RX ORDER — INSULIN LISPRO 100 [IU]/ML
14 INJECTION, SOLUTION INTRAVENOUS; SUBCUTANEOUS
Status: DISCONTINUED | OUTPATIENT
Start: 2024-10-26 | End: 2024-10-27

## 2024-10-26 RX ORDER — AMLODIPINE BESYLATE 5 MG/1
5 TABLET ORAL
Status: DISCONTINUED | OUTPATIENT
Start: 2024-10-26 | End: 2024-10-27

## 2024-10-26 RX ADMIN — INSULIN LISPRO 9 UNITS: 100 INJECTION, SOLUTION INTRAVENOUS; SUBCUTANEOUS at 16:31

## 2024-10-26 RX ADMIN — INSULIN LISPRO 24 UNITS: 100 INJECTION, SOLUTION INTRAVENOUS; SUBCUTANEOUS at 18:12

## 2024-10-26 RX ADMIN — Medication 10 ML: at 11:41

## 2024-10-26 RX ADMIN — AMLODIPINE BESYLATE 5 MG: 5 TABLET ORAL at 12:46

## 2024-10-26 RX ADMIN — PANTOPRAZOLE SODIUM 40 MG: 40 TABLET, DELAYED RELEASE ORAL at 05:55

## 2024-10-26 RX ADMIN — POTASSIUM CHLORIDE 10 MEQ: 750 TABLET, EXTENDED RELEASE ORAL at 11:37

## 2024-10-26 RX ADMIN — INSULIN LISPRO 10 UNITS: 100 INJECTION, SOLUTION INTRAVENOUS; SUBCUTANEOUS at 03:01

## 2024-10-26 RX ADMIN — REGADENOSON 0.4 MG: 0.08 INJECTION, SOLUTION INTRAVENOUS at 10:48

## 2024-10-26 RX ADMIN — METHYLPREDNISOLONE SODIUM SUCCINATE 60 MG: 125 INJECTION, POWDER, FOR SOLUTION INTRAMUSCULAR; INTRAVENOUS at 11:35

## 2024-10-26 RX ADMIN — TECHNETIUM TC 99M SESTAMIBI 1 DOSE: 1 INJECTION INTRAVENOUS at 08:11

## 2024-10-26 RX ADMIN — INSULIN GLARGINE 20 UNITS: 100 INJECTION, SOLUTION SUBCUTANEOUS at 20:59

## 2024-10-26 RX ADMIN — METOPROLOL SUCCINATE 50 MG: 50 TABLET, EXTENDED RELEASE ORAL at 11:37

## 2024-10-26 RX ADMIN — ALLOPURINOL 100 MG: 100 TABLET ORAL at 20:59

## 2024-10-26 RX ADMIN — LISINOPRIL 5 MG: 5 TABLET ORAL at 11:37

## 2024-10-26 RX ADMIN — INSULIN LISPRO 7 UNITS: 100 INJECTION, SOLUTION INTRAVENOUS; SUBCUTANEOUS at 20:59

## 2024-10-26 RX ADMIN — OXYCODONE 5 MG: 5 TABLET ORAL at 01:59

## 2024-10-26 RX ADMIN — TECHNETIUM TC 99M SESTAMIBI 1 DOSE: 1 INJECTION INTRAVENOUS at 10:48

## 2024-10-26 RX ADMIN — INSULIN LISPRO 19 UNITS: 100 INJECTION, SOLUTION INTRAVENOUS; SUBCUTANEOUS at 12:28

## 2024-10-26 RX ADMIN — Medication 10 ML: at 21:00

## 2024-10-26 RX ADMIN — WARFARIN SODIUM 7.5 MG: 5 TABLET ORAL at 16:32

## 2024-10-26 RX ADMIN — LISINOPRIL 5 MG: 5 TABLET ORAL at 20:58

## 2024-10-26 RX ADMIN — INSULIN HUMAN 6 UNITS: 100 INJECTION, SOLUTION PARENTERAL at 12:29

## 2024-10-26 RX ADMIN — FUROSEMIDE 20 MG: 20 TABLET ORAL at 11:37

## 2024-10-26 NOTE — PROGRESS NOTES
Name: Grace Renee ADMIT: 10/25/2024   : 1950  PCP: Jani Sun MD    MRN: [id number] LOS: 1 days   AGE/SEX: 74 y.o. female  ROOM:     [facility]    74 y.o. female admitted with sudden temporal headache in the context of severe hypertension with episode of blurred vision and elevated ESR.  Originally requested to see for possible temporal artery biopsy to evaluate for temporal arteritis.  Symptoms have all resolved, and Dr. Ramirez has seen and doubts TA.    Scheduled Medications:   allopurinol, 100 mg, Oral, Nightly  amLODIPine, 5 mg, Oral, Q24H  atorvastatin, 20 mg, Oral, Daily  furosemide, 20 mg, Oral, Daily  insulin glargine, 1-200 Units, Subcutaneous, Nightly - Glucommander  insulin lispro, 1-200 Units, Subcutaneous, 4x Daily With Meals & Nightly  insulin regular, 6 Units, Subcutaneous, Daily  lisinopril, 5 mg, Oral, BID  methylPREDNISolone sodium succinate, 60 mg, Intravenous, Daily  metoprolol succinate XL, 50 mg, Oral, Q24H  pantoprazole, 40 mg, Oral, Q AM  potassium chloride, 10 mEq, Oral, Daily  sodium chloride, 10 mL, Intravenous, Q12H  warfarin, 7.5 mg, Oral, Daily    Active Infusions:niCARdipine, 5-15 mg/hr, Last Rate: Stopped (10/25/24 1935)  Pharmacy Consult - Steroid Insulin Protocol,   Pharmacy to dose warfarin,     Vital Signs  Vital Signs Patient Vitals for the past 24 hrs:   BP Temp Temp src Pulse Resp SpO2   10/26/24 1134 154/81 97.7 °F (36.5 °C) Oral 85 14 100 %   10/26/24 0700 137/49 -- -- 65 -- 95 %   10/26/24 0453 151/58 97.8 °F (36.6 °C) Oral 63 14 96 %   10/26/24 0137 142/47 98 °F (36.7 °C) Oral 80 18 98 %   10/25/24 2243 160/92 -- -- 73 -- --   10/25/24 2011 154/53 98.3 °F (36.8 °C) Oral 69 17 95 %   10/25/24 1935 147/55 -- -- 74 -- --   10/25/24 1717 154/53 97.7 °F (36.5 °C) Oral 80 20 95 %   10/25/24 1709 160/52 -- -- 83 -- 97 %   10/25/24 1644 155/56 -- -- 77 -- --   10/25/24 1632 148/56 -- -- 79 -- 96 %   10/25/24 1617 155/65 -- -- 80 -- 98 %   10/25/24 1602  157/51 -- -- 86 -- 97 %   10/25/24 1530 173/53 -- -- -- -- --     Assessment & Plan   Assessment & Plan    Hypertensive urgency    Acute nonintractable headache    Elevated sed rate    74 y.o. female with sudden temporal headache without prodrome or jaw claudication.  Blurred vision is very nonspecific.  I agree that the likelihood of temporal artery uses low, and there is an alternative diagnosis that is much more likely, namely uncontrolled hypertension.  I do not recommend temporal artery biopsy at this time, but will be available if others feel strongly.  Will see again on request.    Adán Sethi MD  10/26/24, 15:21 EDT    Office contact: [phone]

## 2024-10-26 NOTE — PROGRESS NOTES
Cardiology Progress Note    Patient Identification:  Name: Grace Renee  Age: 74 y.o.  Sex: female  :  1950  MRN: [id number]                 Follow Up / Chief Complaint: Jaw pain, headache, uncontrolled hypertension  Chief Complaint   Patient presents with    Headache       Interval History: Patient presented to the emergency room with headache and jaw pain was found to have uncontrolled hypertension.       Subjective: Patient denies any chest pain today.  Is scheduled for stress test today.      Objective:  10/26/2024: HS troponin is 33, glucose 315, white count elevated at 15.7.    History of present illness:      Ms. Grace Renee has a PMH of      - Hypertension   - Dyslipidemia  - diabetes   - Pulmonary emboli s/p mechanical thrombectomy 2021  - CONNOR   - GERD  - Arthritis   - Obesity, BMI 49   - no allergies         Presented to the ED with headache and jaw pain.  Patient reports that she was here in the hospital spending the night with her sister when she woke up around 2 AM with severe headache as well as jaw pain and hip pain.  Patient was significantly hypertensive and placed on cardene drip.  She also notes that she has some broken teeth in her mouth and has had problems with gum swelling in the past, she thought that maybe she was getting an infection.  She reports that her headache and jaw pain have resolved and now she still has significant pain.   She denies any chest pain or shortness of breath.  She does note that on the way to the hospital to visit her sister that she had a hard time seeing her vision was blurry, which is now resolved.          Labs in the ED showed HS troponin 17, creatinine 1.11, WBC 12.8 sed rate 106 otherwise unremarkable.  EKG normal sinus rhythm. CT head negative      Echocardiogram pending patient remains on Cardene drip  Will check blood cultures  Restart home lisinopril monitor blood pressure closely and off Cardene drip           Assessment:  :      Hypertensive emergency  Elevated troponin at 17  Dyslipidemia  Diabetes, with A1c of 11.2  History of PE and anticoagulation, INR therapeutic at 2.38  Obesity with BMI over 49        Recommendations / Plan:         Patient presented with headache while visiting family in the hospital was found to have significantly elevated blood pressure.  Patient is currently admitted to PCU in telemetry  Patient was hypotensive requiring IV Cardene.  Now is off IV Cardene.  Continue lisinopril and  beta-blockers 50 twice daily.  Patient had an echocardiogram 10/25/2024 which revealed EF of 61 to 65% with mild left atrial enlargement and mild aortic stenosis  Patient's troponin is mildly elevated at 17.  Will repeat troponin.  EKG 10/25/2024 reviewed/interpreted by me reveals sinus rhythm with a rate of 72 bpm  Will schedule for stress test to guide therapy.  Patient would benefit from diabetes control  Will defer evaluation and therapy of leukocytosis to primary team.  Patient had CT head 10/25/2024 which was negative.  Does not have any further HA.  Patient's sed rate is elevated at 106, but HS troponin is normal at 0.36.  Appreciated hospitalist input being worked up for temporal arteritis..         Copied text in this portion of the note has been reviewed and is accurate as of 10/26/2024    Past Medical History:  Past Medical History:   Diagnosis Date    Diabetes mellitus     Elevated cholesterol     GERD (gastroesophageal reflux disease)     Hypertension     Osteoarthritis of left hip 11/6/2021    Sleep apnea      Past Surgical History:  Past Surgical History:   Procedure Laterality Date    CARDIAC CATHETERIZATION Bilateral 11/2/2021    Procedure: Percutaneous Mechanical Thrombectomy- INARI;  Surgeon: Chris Pritchett MD;  Location: [facility] CATH INVASIVE LOCATION;  Service: Cardiovascular;  Laterality: Bilateral;    CARDIAC CATHETERIZATION N/A 11/2/2021    Procedure: Right Heart Cath;  Surgeon: Chris Pritchett MD;   "Location: [facility] CATH INVASIVE LOCATION;  Service: Cardiovascular;  Laterality: N/A;    INTERVENTIONAL RADIOLOGY PROCEDURE Bilateral 11/2/2021    Procedure: Pulmonary Angiogram;  Surgeon: Chris Pritchett MD;  Location: [facility] CATH INVASIVE LOCATION;  Service: Cardiovascular;  Laterality: Bilateral;        Social History:   Social History     Tobacco Use    Smoking status: Never    Smokeless tobacco: Never   Substance Use Topics    Alcohol use: Not Currently      Family History:  History reviewed. No pertinent family history.       Allergies:  No Known Allergies  Scheduled Meds:  allopurinol, 100 mg, Nightly  atorvastatin, 20 mg, Daily  furosemide, 20 mg, Daily  insulin glargine, 1-200 Units, Nightly - Glucommander  insulin lispro, 1-200 Units, 4x Daily With Meals & Nightly  lisinopril, 5 mg, BID  methylPREDNISolone sodium succinate, 60 mg, Daily  metoprolol succinate XL, 50 mg, Q24H  pantoprazole, 40 mg, Q AM  potassium chloride, 10 mEq, Daily  sodium chloride, 10 mL, Q12H  warfarin, 7.5 mg, Daily          Review of Systems:   ROS        Constitutional:  Temp:  [97.7 °F (36.5 °C)-98.3 °F (36.8 °C)] 97.8 °F (36.6 °C)  Heart Rate:  [] 63  Resp:  [14-20] 14  BP: (127-181)/(47-92) 151/58    Physical Exam   /58 (BP Location: Right arm, Patient Position: Lying)   Pulse 63   Temp 97.8 °F (36.6 °C) (Oral)   Resp 14   Ht 167.6 cm (66\")   Wt (!) 138 kg (304 lb)   SpO2 96%   BMI 49.07 kg/m²   General:  Appears in no acute distress  Eyes: Sclera is anicteric,  conjunctiva is clear   HEENT:  No JVD. Thyroid not visibly enlarged. No mucosal pallor or cyanosis  Respiratory: Respirations regular and unlabored at rest.  Clear to auscultation  Cardiovascular: S1,S2 Regular rate and rhythm.    Gastrointestinal: Abdomen nondistended.  Musculoskeletal:  No abnormal movements  Extremities: No digital clubbing or cyanosis  Skin: Color pink.   Neuro: Alert and awake.    INTAKE AND OUTPUT:    Intake/Output Summary " (Last 24 hours) at 10/26/2024 0729  Last data filed at 10/25/2024 1943  Gross per 24 hour   Intake 720 ml   Output --   Net 720 ml       Cardiographics  Telemetry: Sinus    ECG:   ECG 12 Lead Other; Headache   Final Result   HEART RATE=72  bpm   RR Vmxlegwy=307  ms   SC Snswyxnt=516  ms   P Horizontal Axis=-59  deg   P Front Axis=-77  deg   QRSD Interval=89  ms   QT Rbumozap=870  ms   RCtA=958  ms   QRS Axis=23  deg   T Wave Axis=65  deg   - OTHERWISE NORMAL ECG -   Sinus or ectopic atrial rhythm   When compared with ECG of 08-Nov-2021 13:24:37,   Significant change in rhythm   Electronically Signed By: Kenan Gilbert (HANNAH) 2024-10-25 06:45:36   Date and Time of Study:2024-10-25 03:34:34      Telemetry Scan   Final Result      Telemetry Scan   Final Result      Telemetry Scan   Final Result      Telemetry Scan   Final Result      Telemetry Scan   Final Result        I have personally reviewed EKG    Echocardiogram: Results for orders placed during the hospital encounter of 10/25/24    Adult Transthoracic Echo Complete W/ Cont if Necessary Per Protocol    Interpretation Summary    Left ventricular ejection fraction appears to be 61 - 65%.    Left ventricular diastolic function was normal.    The left atrial cavity is mildly dilated.    Mild aortic valve stenosis is present.      Lab Review   I have reviewed the labs  Results from last 7 days   Lab Units 10/26/24  0552 10/26/24  0144 10/25/24  0419   HSTROP T ng/L 33* 39* 17*         Results from last 7 days   Lab Units 10/26/24  0144   SODIUM mmol/L 138   POTASSIUM mmol/L 4.6   BUN mg/dL 29*   CREATININE mg/dL 1.27*   CALCIUM mg/dL 8.8         Results from last 7 days   Lab Units 10/26/24  0144 10/25/24  0419   WBC 10*3/mm3 15.70* 12.80*   HEMOGLOBIN g/dL 11.4* 13.5   HEMATOCRIT % 36.5 44.9   PLATELETS 10*3/mm3 259 242     Results from last 7 days   Lab Units 10/25/24  0419   INR  2.38       RADIOLOGY:  Imaging Results (Last 24 Hours)       ** No results found for  "the last 24 hours. **                  )10/26/2024  MD MAXI Hennessy/Transcription:   \"Dictated utilizing Dragon dictation\".   "

## 2024-10-26 NOTE — NURSING NOTE
Pt has had elevated blood sugar this afternoon. Pt received her dinner, blood sugar was re-checked it was 414, this nurse notified Hospitalist. Per glucommander pt is to get 13 units of Humalog, not sure it is taking in consideration that pt is on steroids. Hospitalist notified, he will discontinue Glucommander and start high dose SSI. Will treat high glucose once ssi order is started per MD and approved from pharmacy.

## 2024-10-26 NOTE — PROGRESS NOTES
[facility] MEDICINE SERVICE  DAILY PROGRESS NOTE    NAME: Grace Renee  : 1950  MRN: [id number]      LOS: 1 day     PROVIDER OF SERVICE: Jhonny Munguia MD    Chief Complaint: Hypertensive urgency    Subjective:     Interval History:  History taken from: Patient and patient's chart     Went for stress test this morning.  Complaining of temporal headache-stabbing character.  Blood pressure noted to be elevated this a.m.        Review of Systems:   Review of Systems  All negative except above  Objective:     Vital Signs  Temp:  [97.7 °F (36.5 °C)-98.3 °F (36.8 °C)] 97.7 °F (36.5 °C)  Heart Rate:  [63-93] 85  Resp:  [14-20] 14  BP: (127-176)/(47-92) 154/81   Body mass index is 49.07 kg/m².    Physical Exam  Physical Exam  General: Alert and oriented, no acute distress.  HENT: Normocephalic, moist oral mucosa, no scleral icterus.  Neck: Supple, nontender, no carotid bruits, no JVD, no LAD.  Lungs: Clear to auscultation, nonlabored respiration.  Heart: RRR, no murmur, gallop or edema.  Abdomen: Soft, nontender, nondistended, + bowel sounds.  Musculoskeletal: Normal range of motion and strength, no tenderness or swelling.  Neuro: alert and awake, moving all 4 extremities   Skin: Skin is warm, dry and pink, no rashes or lesions.  Psychiatric: Cooperative, appropriate mood and affect.         Diagnostic Data    Results from last 7 days   Lab Units 10/26/24  0144   WBC 10*3/mm3 15.70*   HEMOGLOBIN g/dL 11.4*   HEMATOCRIT % 36.5   PLATELETS 10*3/mm3 259   GLUCOSE mg/dL 151*   CREATININE mg/dL 1.27*   BUN mg/dL 29*   SODIUM mmol/L 138   POTASSIUM mmol/L 4.6   ANION GAP mmol/L 10.8       XR Hip With or Without Pelvis 2 - 3 View Left    Result Date: 10/25/2024  Impression: Severe left and moderate right hip joint degeneration. Electronically Signed: Otis Shine MD  10/25/2024 5:28 AM EDT  Workstation ID: [id number]    CT Head Without Contrast    Result Date: 10/25/2024  Impression: No acute  intracranial abnormality. Electronically Signed: Otis Shine MD  10/25/2024 4:10 AM EDT  Workstation ID: [id number]       I have reviewed patient labs and imaging     Assessment/Plan:     Active and Resolved Problems  Hypertensive urgency  History of hypertension  Initial systolic greater than 200  Troponin 17  EKG sinus rhythm rate 72, no evidence of acute  Echo 10/25/2024 with EF 61-65%  off Cardene drip  On furosemide 20 mg, lisinopril 5 mg twice daily, metoprolol XL 50 mg  Add tablet amlodipine 5 mg  Monitor blood pressure and adjust medication accordingly     Headache, left temporal region  Elevated sedimentation rate  WBC 12.8, , concern for temporal arteritis  CT head without acute findings  Continue Solu-Medrol  Vascular consulted-noted plan for temporal artery biopsy on Monday or Tuesday  Neurology consulted     Left hip pain  History of osteoarthritis  X-ray of pelvis with severe left and moderate right hip joint degeneration  As needed pain medication     Diabetes mellitus type 2  Anticipate hyperglycemia in setting of exogenous steroids  , steroid hyperglycemia protocol ordered  SSI  Consistent carb diet  Hypoglycemia protocol      History of PE  INR goal 2-3  Continue warfarin, pharmacy to dose     Hyperlipidemia  Continue Lipitor     Gout  Continue allopurinol     GERD  Continue PPI  VTE Prophylaxis:  Pharmacologic VTE prophylaxis orders are present.             Disposition Planning:     Barriers to Discharge: Medical clearance  Anticipated Date of Discharge: 10/29  Place of Discharge: Home      Time: 40 minutes     Code Status and Medical Interventions: CPR (Attempt to Resuscitate); Full Support   Ordered at: 10/25/24 0523     Code Status (Patient has no pulse and is not breathing):    CPR (Attempt to Resuscitate)     Medical Interventions (Patient has pulse or is breathing):    Full Support       Signature: Electronically signed by Jhonny Munguia MD, 10/26/24, 12:35  EDT.  [facility] Hospitalist Team

## 2024-10-26 NOTE — NURSING NOTE
This pt left for stress test prior to 8 am assessment, this nurse was not able to perform morning assessment, just quick b/p. Pt has been there since before 8 am, this nurse reached to Nuclear Department since it has been well over 2 hours since she has been down for stress test. This nurse reached out for update, it appeared Nuclear had a late start on Stress tests, pt will be back in room in about 45 mins to an hour. Will perform head to toe assessment when pt returns.

## 2024-10-26 NOTE — PLAN OF CARE
Goal Outcome Evaluation:  Plan of Care Reviewed With: patient        Progress: improving  Outcome Evaluation: Pt resting this shift, denies any pain. Pt had stress test this morning, this nurse was unable to do 8 am assessment, due to MD delays down in Nuclear Medicine pt did not come back to unit around 11:30. Pt blood pressure was elevated, Amlodipine was added, after 2 hour recheck per MD request, b/p numbers came down, laters b/p 136/62. Pt denies any dizziness, headaches and was able to ambulate to and from bathroom with her walker with stand by assist. Pt has been sitting in her chair most of the day. Blood sugars have been elevated due to iv steroids, but pt was started on Glucommander and Novolin R with meals. Pt had to be given a prn dose of insulin from Glucommander this afternoon due to elevated blood sugar, also due to her food not coming for another hour. Pt tolerated well, will re-check blood sugar and give insulin per glucommander and carb consumption. Pt can resume her Warfarin as pt no longer needs biopsy. Call light within reach.

## 2024-10-26 NOTE — PLAN OF CARE
Assessment: Grace Renee presents with functional mobility impairments which indicate the need for skilled intervention. Tolerating session today without incident. Pt did very well amb in room using her rollator at slowed neeraj with no LOB noted. Plans on dc home with HH. Will continue to follow and progress as tolerated.

## 2024-10-26 NOTE — NURSING NOTE
This nurse started High dose ssi per MD order, MD then changed his mind and asked for this nurse to d/c high dose ssi and place low dose ssi as he d/c steroids.

## 2024-10-26 NOTE — CONSULTS
Primary Care Provider: Provider, No Known     Consult requested by: Admitting team    Reason for Consultation: Neurological evaluation /headache and blurred vision    History taken from: patient chart RN discussions with primary    Chief complaint: Headache and blurred vision       SUBJECTIVE:    History of present illness: Background per H&P: Grace Renee is a 74 y.o. female who was evaluated in room 2102 at [facility]    Source of information is the patient, the medical records and my discussion with the primary team    This patient was actually was in this hospital, visiting her sister and she suddenly developed sharp stabbing-like pain in the left temporal region.  She reported bilateral blurred vision also.    She got concerned and went to the ER    She was evaluated and found to have significant blood pressure elevation, at 1 time her systolic was as high as 270 and diastolic as high as 170    Head CT was okay    I believe she got some steroids    1 concern was that this could be temporal arteritis but all her symptoms resolved    Her ESR was elevated at 106 but CRP was fine    He has no jaw claudication    Her exam is unremarkable            As per admitting,  Grace Renee is a 74 y.o. female with a CMH of diabetes mellitus, history of PE, HLD, HTN, CONNOR, GERD, osteoarthritis who presented to [facility] on 10/25/2024 with headache x 2 days.  Patient states that she was asleep when she suddenly woke up with a headache that lasted a few hours with associated left jaw pain. She localizes pain to left temporal region.  Also reports left-sided blurry vision with few hours prior.  Reports that headache is not recurred. Denies neck pain. Denies visual changes, nausea, vomiting, unilateral weakness or paresthesias of extremities.  Denies history of headaches.  She also endorses left groin/hip pain but reports history of arthritis.      In the ED, patient was noted to be hypertensive with  systolics greater than 200.  Labs pertinent for elevated sed rate of 106. Troponin 17. Mild leukocytosis WBC 12.8. INR therapeutic at 2.3. EKG was sinus rhythm rate of 72, no evidence of acute ischemia. CT head without acute intracranial abnormalities.  Pelvis x-ray with no acute findings as well.  Patient was started on Cardene drip as well as Solu-Medrol for concerns of giant cell arteritis.  Hospital service to admit for further management.     Review of Systems   Constitutional:  Negative for chills and fever.   Respiratory:  Negative for shortness of breath.    Cardiovascular:  Negative for chest pain.   Gastrointestinal:  Negative for abdominal pain, nausea and vomiting.   Musculoskeletal:         Left hip pain   Neurological:  Positive for headaches. Negative for dizziness, speech difficulty, weakness and numbness.   All other systems reviewed and are negative.        - Portions of the above HPI were copied from previous encounters and edited as appropriate. PMH as detailed below.     Review of Systems   No fever chills rigors or sweats  He has morbid obesity, BMI 49.07  Sleep apnea  HEENT:  No speech problem, vision changes, facial asymmetry or pain, or neck problem  Chest: No chest pain, clubbing, cyanosis, orthopnea palpitations  Pulmonary:  No shortness of air, cough or expectoration  Abdomen:  No swelling/tension, constipation,diarrhea or pain  No genitourinary symptoms  Extremity problems as discussed  No back problem  No psychotic issues  Neurologic issues as discussed  No hematologic, dermatologic or endocrine problems she is diabetic        PATIENT HISTORY:  Past Medical History:   Diagnosis Date    Diabetes mellitus     Elevated cholesterol     GERD (gastroesophageal reflux disease)     Hypertension     Osteoarthritis of left hip 11/6/2021    Sleep apnea    ,   Past Surgical History:   Procedure Laterality Date    CARDIAC CATHETERIZATION Bilateral 11/2/2021    Procedure: Percutaneous Mechanical  Thrombectomy- [facility];  Surgeon: Chris Pritchett MD;  Location:  [facility];  Service: Cardiovascular;  Laterality: Bilateral;    CARDIAC CATHETERIZATION N/A 11/2/2021    Procedure: Right Heart Cath;  Surgeon: Chris Pritchett MD;  Location:  [facility];  Service: Cardiovascular;  Laterality: N/A;    INTERVENTIONAL RADIOLOGY PROCEDURE Bilateral 11/2/2021    Procedure: Pulmonary Angiogram;  Surgeon: Chris Pritchett MD;  Location:  [facility];  Service: Cardiovascular;  Laterality: Bilateral;   , History reviewed. No pertinent family history.,   Social History     Tobacco Use    Smoking status: Never    Smokeless tobacco: Never   Vaping Use    Vaping status: Never Used   Substance Use Topics    Alcohol use: Not Currently    Drug use: Never   ,   Prior to Admission medications    Medication Sig Start Date End Date Taking? Authorizing Provider   allopurinol (ZYLOPRIM) 100 MG tablet Take 1 tablet by mouth Every Night.   Yes Gibran Simpson MD   atorvastatin (LIPITOR) 20 MG tablet Take 1 tablet by mouth Daily.   Yes Gibran Simpson MD   calcium carbonate (TUMS) 500 MG chewable tablet Chew 1 tablet 3 (Three) Times a Day As Needed for Indigestion or Heartburn. 11/10/21  Yes Laci Gordon MD   Cholecalciferol (Vitamin D3) 50 MCG capsule Take 1 capsule by mouth Daily.   Yes Gibran Simpson MD   furosemide (LASIX) 20 MG tablet Take 1 tablet by mouth Daily. 11/11/21  Yes Laci Gordon MD   gabapentin (NEURONTIN) 300 MG capsule Take 1 capsule by mouth 3 (Three) Times a Day As Needed.   Yes Gibran Simpson MD   Insulin NPH Isophane & Regular (HumuLIN 70/30 KwikPen) (70-30) 100 UNIT/ML suspension pen-injector Inject 50 Units under the skin into the appropriate area as directed Every Morning.   Yes Gibran Simpson MD   Insulin NPH Isophane & Regular (HumuLIN 70/30 KwikPen) (70-30) 100 UNIT/ML suspension pen-injector Inject 30 Units  under the skin into the appropriate area as directed Every Evening.   Yes ProviderGibran MD   lisinopril (PRINIVIL,ZESTRIL) 5 MG tablet Take 1 tablet by mouth 2 (Two) Times a Day. 2/2/22  Yes Chris Pritchett MD   omeprazole (priLOSEC) 40 MG capsule Take 1 capsule by mouth Daily. 1/1/22  Yes ProviderGibran MD   potassium chloride (KLOR-CON M10) 10 MEQ CR tablet Take 1 tablet by mouth Daily.   Yes ProviderGibran MD   tiZANidine (ZANAFLEX) 4 MG tablet Take 1 tablet by mouth 3 (Three) Times a Day As Needed for Muscle Spasms.   Yes ProviderGibran MD   warfarin (COUMADIN) 7.5 MG tablet 1 tablet every day  Indications: DVT/PE (active thrombosis) 11/10/21  Yes Laci Gordon MD   metoclopramide (REGLAN) 10 MG tablet Take 1 tablet by mouth Daily.    Provider, MD Gibran    Allergies:  Patient has no known allergies.    Current Facility-Administered Medications   Medication Dose Route Frequency Provider Last Rate Last Admin    acetaminophen (TYLENOL) tablet 650 mg  650 mg Oral Q4H PRN Mirella Pop APRN   650 mg at 10/25/24 1040    Or    acetaminophen (TYLENOL) 160 MG/5ML oral solution 650 mg  650 mg Oral Q4H PRN Mirella Pop APRN        Or    acetaminophen (TYLENOL) suppository 650 mg  650 mg Rectal Q4H PRN Mirella Pop APRN        allopurinol (ZYLOPRIM) tablet 100 mg  100 mg Oral Nightly Iveth Steele PA-C   100 mg at 10/25/24 2236    amLODIPine (NORVASC) tablet 5 mg  5 mg Oral Q24H Jhonny Munguia MD   5 mg at 10/26/24 1246    atorvastatin (LIPITOR) tablet 20 mg  20 mg Oral Daily Iveth Steele PA-C        sennosides-docusate (PERICOLACE) 8.6-50 MG per tablet 2 tablet  2 tablet Oral BID PRN Mirella Pop APRN        And    polyethylene glycol (MIRALAX) packet 17 g  17 g Oral Daily PRN Yahyawi, Mirella L, APRN        And    bisacodyl (DULCOLAX) EC tablet 5 mg  5 mg Oral Daily PRN Mirella Pop APRN        And    bisacodyl (DULCOLAX) suppository 10 mg   10 mg Rectal Daily PRN Mirella Pop APRN        calcium carbonate (TUMS) chewable tablet 500 mg (200 mg elemental)  1 tablet Oral TID PRN Iveth Steele PA-C        dextrose (D50W) (25 g/50 mL) IV injection 10-50 mL  10-50 mL Intravenous Q15 Min PRN Llanes Alvarez, Carlos, MD        dextrose (GLUTOSE) oral gel 15 g  15 g Oral Q15 Min PRN Llanes Alvarez, Carlos, MD        furosemide (LASIX) tablet 20 mg  20 mg Oral Daily Iveth Steele PA-C   20 mg at 10/26/24 1137    gabapentin (NEURONTIN) capsule 300 mg  300 mg Oral TID PRN Iveth Steele PA-C        Glucagon (GLUCAGEN) injection 1 mg  1 mg Intramuscular Q15 Min PRN Llanes Alvarez, Carlos, MD        insulin glargine (LANTUS, SEMGLEE) injection 1-200 Units  1-200 Units Subcutaneous Nightly - Glucommander Llanes Alvarez, Carlos, MD        insulin lispro (HUMALOG/ADMELOG) injection 1-200 Units  1-200 Units Subcutaneous 4x Daily With Meals & Nightly Llanes Alvarez, Carlos, MD   19 Units at 10/26/24 1228    insulin lispro (HUMALOG/ADMELOG) injection 1-200 Units  1-200 Units Subcutaneous PRN Llanes Alvarez, Carlos, MD   10 Units at 10/26/24 0301    insulin regular (humuLIN R,novoLIN R) injection 6 Units  6 Units Subcutaneous Daily Iveth Steele PA-C   6 Units at 10/26/24 1229    lisinopril (PRINIVIL,ZESTRIL) tablet 5 mg  5 mg Oral BID Charanjit Swain MD   5 mg at 10/26/24 1137    melatonin tablet 5 mg  5 mg Oral Nightly PRN Mirella Pop APRN        methylPREDNISolone sodium succinate (SOLU-Medrol) injection 60 mg  60 mg Intravenous Daily Iveth Steele PA-C   60 mg at 10/26/24 1135    metoprolol succinate XL (TOPROL-XL) 24 hr tablet 50 mg  50 mg Oral Q24H Michael Jiménez MD   50 mg at 10/26/24 1137    niCARdipine (CARDENE) 25mg in 250mL NS infusion  5-15 mg/hr Intravenous Titrated Kenan Gilbert MD   Stopped at 10/25/24 1935    nitroglycerin (NITROSTAT) SL tablet 0.4 mg  0.4 mg Sublingual Q5 Min PRN Mirella Pop, APRN         ondansetron (ZOFRAN) injection 4 mg  4 mg Intravenous Q6H PRN Mirella Pop APRN        oxyCODONE (ROXICODONE) immediate release tablet 5 mg  5 mg Oral Q6H PRN Iveth Steele PA-C   5 mg at 10/26/24 0159    pantoprazole (PROTONIX) EC tablet 40 mg  40 mg Oral Q AM Iveth Steele PA-C   40 mg at 10/26/24 0555    Pharmacy Consult - Steroid Insulin Protocol   Does not apply Continuous PRN Iveth Steele PA-C        Pharmacy to dose warfarin   Does not apply Continuous PRN Mirella Pop APRN        potassium chloride (K-DUR,KLOR-CON) ER tablet 10 mEq  10 mEq Oral Daily Iveth Steele PA-C   10 mEq at 10/26/24 1137    sodium chloride 0.9 % flush 10 mL  10 mL Intravenous PRN Kenan Gilbert MD        sodium chloride 0.9 % flush 10 mL  10 mL Intravenous Q12H Mirella Pop APRN   10 mL at 10/26/24 1141    sodium chloride 0.9 % flush 10 mL  10 mL Intravenous PRN Mirella Pop APRN        tiZANidine (ZANAFLEX) tablet 4 mg  4 mg Oral TID PRN Iveth Steele PA-C        warfarin (COUMADIN) tablet 7.5 mg  7.5 mg Oral Daily Iveth Steele PA-C            ________________________________________________________        OBJECTIVE:  Upon today's exam, patient resting comfortably in bed in no acute distress      The patient is awake and alert and oriented x3  Normal speech  Cranial nerve examination demonstrate:  Full fields of vision to confrontation  Pupils are round, reactive to light and accommodation and size of about 3 mm  No ptosis or nystagmus  Funduscopic examination was not successful  Eye movements are conjugate     Sensation on the face and scalp are normal  Muscles of mastication are normal and symmetric  Muscles of  facial expression are normal and symmetric  Hearing is intact bilaterally  Head turning and shoulder shrugs were unremarkable  Tongue was midline  I could not visualize  oropharynx or uvula         No temporal or jaw claudication or tenderness  Motor examination:  Normal bulk, tone  and strength was 5/5  No fasciculations     Sensory examination:  Intact for soft touch, pain   Romberg was not evaluated     Reflexes:  0/4     Coordination:  Normal finger-to-nose to finger, rapid alternating movements and toe to finger     Gait:  Deferred     Toe signs:  Mute            ________________________________________________________   RESULTS REVIEW:    VITAL SIGNS:   Temp:  [97.7 °F (36.5 °C)-98.3 °F (36.8 °C)] 97.7 °F (36.5 °C)  Heart Rate:  [63-86] 85  Resp:  [14-20] 14  BP: (137-173)/(47-92) 154/81     LABS:      Lab 10/26/24  0144 10/25/24  0419   WBC 15.70* 12.80*   HEMOGLOBIN 11.4* 13.5   HEMATOCRIT 36.5 44.9   PLATELETS 259 242   NEUTROS ABS  --  5.22   IMMATURE GRANS (ABS)  --  0.03   LYMPHS ABS  --  5.81*   MONOS ABS  --  0.99*   EOS ABS  --  0.61*   MCV 86.7 89.3   SED RATE  --  106*   CRP  --  0.36   PROTIME  --  24.3         Lab 10/26/24  0144 10/25/24  0419   SODIUM 138 139   POTASSIUM 4.6 3.9   CHLORIDE 103 100   CO2 24.2 28.3   ANION GAP 10.8 10.7   BUN 29* 19   CREATININE 1.27* 1.11*   EGFR 44.7* 52.6*   GLUCOSE 151* 145*   CALCIUM 8.8 9.7             Lab 10/26/24  0552 10/26/24  0144 10/25/24  0419   PROBNP  --   --  153.0   HSTROP T 33* 39* 17*   PROTIME  --   --  24.3   INR  --   --  2.38                     Lab Results   Component Value Date     (H) 08/21/2024    HGBA1C 11.2 (H) 08/21/2024    [id number] 1,999 (H) 11/06/2021       IMAGING STUDIES:  XR Hip With or Without Pelvis 2 - 3 View Left    Result Date: 10/25/2024  Impression: Severe left and moderate right hip joint degeneration. Electronically Signed: Otis Shine MD  10/25/2024 5:28 AM EDT  Workstation ID: [id number]    CT Head Without Contrast    Result Date: 10/25/2024  Impression: No acute intracranial abnormality. Electronically Signed: Otis Shine MD  10/25/2024 4:10 AM EDT  Workstation ID: [id number]     I reviewed the patient's new clinical results.    ________________________________________________________      PROBLEM LIST:    Hypertensive urgency    Acute nonintractable headache    Elevated sed rate            ASSESSMENT/PLAN:  Single episode of headache  No new symptoms right now  Her blood pressure was elevated    She has some blurred vision    She does not look like classic case of temporal arteritis    I do not think she needs long-term steroids    I will observe    Call me if needed    If MRIs done and it shows any abnormalities, please let me know    Considering her diabetes and considering that her symptoms are not there, her CRP is okay, I am not sure she will be a candidate for long-term steroids    Biopsy?        I discussed the patient's findings and my recommendations with patient and nursing staff    Miguel Aguila MD  10/26/24  13:37 EDT

## 2024-10-27 LAB
ANION GAP SERPL CALCULATED.3IONS-SCNC: 10.8 MMOL/L (ref 5–15)
BUN SERPL-MCNC: 34 MG/DL (ref 8–23)
BUN/CREAT SERPL: 27 (ref 7–25)
CALCIUM SPEC-SCNC: 9 MG/DL (ref 8.6–10.5)
CHLORIDE SERPL-SCNC: 98 MMOL/L (ref 98–107)
CO2 SERPL-SCNC: 23.2 MMOL/L (ref 22–29)
CREAT SERPL-MCNC: 1.26 MG/DL (ref 0.57–1)
DEPRECATED RDW RBC AUTO: 43.9 FL (ref 37–54)
EGFRCR SERPLBLD CKD-EPI 2021: 45.2 ML/MIN/1.73
ERYTHROCYTE [DISTWIDTH] IN BLOOD BY AUTOMATED COUNT: 13.8 % (ref 12.3–15.4)
GLUCOSE BLDC GLUCOMTR-MCNC: 162 MG/DL (ref 70–105)
GLUCOSE BLDC GLUCOMTR-MCNC: 253 MG/DL (ref 70–105)
GLUCOSE BLDC GLUCOMTR-MCNC: 279 MG/DL (ref 70–105)
GLUCOSE BLDC GLUCOMTR-MCNC: 324 MG/DL (ref 70–105)
GLUCOSE SERPL-MCNC: 311 MG/DL (ref 65–99)
HBA1C MFR BLD: 10.9 % (ref 4.8–5.6)
HCT VFR BLD AUTO: 39.4 % (ref 34–46.6)
HGB BLD-MCNC: 11.9 G/DL (ref 12–15.9)
INR PPP: 2.23 (ref 2–3)
MCH RBC QN AUTO: 26.3 PG (ref 26.6–33)
MCHC RBC AUTO-ENTMCNC: 30.2 G/DL (ref 31.5–35.7)
MCV RBC AUTO: 87 FL (ref 79–97)
PLATELET # BLD AUTO: 236 10*3/MM3 (ref 140–450)
PMV BLD AUTO: 10.5 FL (ref 6–12)
POTASSIUM SERPL-SCNC: 4.1 MMOL/L (ref 3.5–5.2)
POTASSIUM SERPL-SCNC: 5.5 MMOL/L (ref 3.5–5.2)
PROTHROMBIN TIME: 22.9 SECONDS (ref 19.4–28.5)
RBC # BLD AUTO: 4.53 10*6/MM3 (ref 3.77–5.28)
SODIUM SERPL-SCNC: 132 MMOL/L (ref 136–145)
WBC NRBC COR # BLD AUTO: 15.48 10*3/MM3 (ref 3.4–10.8)

## 2024-10-27 PROCEDURE — 82948 REAGENT STRIP/BLOOD GLUCOSE: CPT | Performed by: STUDENT IN AN ORGANIZED HEALTH CARE EDUCATION/TRAINING PROGRAM

## 2024-10-27 PROCEDURE — 63710000001 INSULIN GLARGINE PER 5 UNITS: Performed by: STUDENT IN AN ORGANIZED HEALTH CARE EDUCATION/TRAINING PROGRAM

## 2024-10-27 PROCEDURE — 83036 HEMOGLOBIN GLYCOSYLATED A1C: CPT | Performed by: STUDENT IN AN ORGANIZED HEALTH CARE EDUCATION/TRAINING PROGRAM

## 2024-10-27 PROCEDURE — 63710000001 INSULIN LISPRO (HUMAN) PER 5 UNITS: Performed by: STUDENT IN AN ORGANIZED HEALTH CARE EDUCATION/TRAINING PROGRAM

## 2024-10-27 PROCEDURE — 82948 REAGENT STRIP/BLOOD GLUCOSE: CPT

## 2024-10-27 PROCEDURE — 63710000001 INSULIN LISPRO (HUMAN) PER 5 UNITS

## 2024-10-27 PROCEDURE — 85027 COMPLETE CBC AUTOMATED: CPT

## 2024-10-27 PROCEDURE — 99232 SBSQ HOSP IP/OBS MODERATE 35: CPT | Performed by: INTERNAL MEDICINE

## 2024-10-27 PROCEDURE — 80048 BASIC METABOLIC PNL TOTAL CA: CPT

## 2024-10-27 PROCEDURE — 84132 ASSAY OF SERUM POTASSIUM: CPT | Performed by: STUDENT IN AN ORGANIZED HEALTH CARE EDUCATION/TRAINING PROGRAM

## 2024-10-27 PROCEDURE — 85610 PROTHROMBIN TIME: CPT | Performed by: STUDENT IN AN ORGANIZED HEALTH CARE EDUCATION/TRAINING PROGRAM

## 2024-10-27 RX ORDER — INSULIN LISPRO 100 [IU]/ML
3-14 INJECTION, SOLUTION INTRAVENOUS; SUBCUTANEOUS
Status: DISCONTINUED | OUTPATIENT
Start: 2024-10-27 | End: 2024-10-28 | Stop reason: HOSPADM

## 2024-10-27 RX ORDER — VALSARTAN 80 MG/1
160 TABLET ORAL
Status: DISCONTINUED | OUTPATIENT
Start: 2024-10-27 | End: 2024-10-28 | Stop reason: HOSPADM

## 2024-10-27 RX ORDER — AMLODIPINE BESYLATE 5 MG/1
10 TABLET ORAL
Status: DISCONTINUED | OUTPATIENT
Start: 2024-10-27 | End: 2024-10-28 | Stop reason: HOSPADM

## 2024-10-27 RX ORDER — INSULIN LISPRO 100 [IU]/ML
5 INJECTION, SOLUTION INTRAVENOUS; SUBCUTANEOUS ONCE
Status: COMPLETED | OUTPATIENT
Start: 2024-10-27 | End: 2024-10-27

## 2024-10-27 RX ORDER — HYDRALAZINE HYDROCHLORIDE 25 MG/1
50 TABLET, FILM COATED ORAL EVERY 8 HOURS SCHEDULED
Status: DISCONTINUED | OUTPATIENT
Start: 2024-10-27 | End: 2024-10-28 | Stop reason: HOSPADM

## 2024-10-27 RX ORDER — INSULIN LISPRO 100 [IU]/ML
17 INJECTION, SOLUTION INTRAVENOUS; SUBCUTANEOUS
Status: DISCONTINUED | OUTPATIENT
Start: 2024-10-28 | End: 2024-10-28 | Stop reason: HOSPADM

## 2024-10-27 RX ADMIN — INSULIN LISPRO 3 UNITS: 100 INJECTION, SOLUTION INTRAVENOUS; SUBCUTANEOUS at 21:09

## 2024-10-27 RX ADMIN — HYDRALAZINE HYDROCHLORIDE 50 MG: 25 TABLET ORAL at 15:53

## 2024-10-27 RX ADMIN — Medication 10 ML: at 08:15

## 2024-10-27 RX ADMIN — INSULIN LISPRO 8 UNITS: 100 INJECTION, SOLUTION INTRAVENOUS; SUBCUTANEOUS at 17:18

## 2024-10-27 RX ADMIN — Medication 10 ML: at 21:10

## 2024-10-27 RX ADMIN — WARFARIN SODIUM 7.5 MG: 5 TABLET ORAL at 17:19

## 2024-10-27 RX ADMIN — INSULIN LISPRO 14 UNITS: 100 INJECTION, SOLUTION INTRAVENOUS; SUBCUTANEOUS at 17:18

## 2024-10-27 RX ADMIN — SODIUM ZIRCONIUM CYCLOSILICATE 10 G: 10 POWDER, FOR SUSPENSION ORAL at 08:13

## 2024-10-27 RX ADMIN — INSULIN LISPRO 5 UNITS: 100 INJECTION, SOLUTION INTRAVENOUS; SUBCUTANEOUS at 00:31

## 2024-10-27 RX ADMIN — HYDRALAZINE HYDROCHLORIDE 50 MG: 25 TABLET ORAL at 21:09

## 2024-10-27 RX ADMIN — INSULIN LISPRO 14 UNITS: 100 INJECTION, SOLUTION INTRAVENOUS; SUBCUTANEOUS at 13:39

## 2024-10-27 RX ADMIN — INSULIN LISPRO 8 UNITS: 100 INJECTION, SOLUTION INTRAVENOUS; SUBCUTANEOUS at 13:39

## 2024-10-27 RX ADMIN — AMLODIPINE BESYLATE 10 MG: 5 TABLET ORAL at 08:14

## 2024-10-27 RX ADMIN — VALSARTAN 160 MG: 80 TABLET, FILM COATED ORAL at 19:15

## 2024-10-27 RX ADMIN — POTASSIUM CHLORIDE 10 MEQ: 750 TABLET, EXTENDED RELEASE ORAL at 08:13

## 2024-10-27 RX ADMIN — PANTOPRAZOLE SODIUM 40 MG: 40 TABLET, DELAYED RELEASE ORAL at 06:21

## 2024-10-27 RX ADMIN — METOPROLOL SUCCINATE 50 MG: 50 TABLET, EXTENDED RELEASE ORAL at 08:14

## 2024-10-27 RX ADMIN — INSULIN LISPRO 10 UNITS: 100 INJECTION, SOLUTION INTRAVENOUS; SUBCUTANEOUS at 08:13

## 2024-10-27 RX ADMIN — ALLOPURINOL 100 MG: 100 TABLET ORAL at 21:09

## 2024-10-27 RX ADMIN — LISINOPRIL 5 MG: 5 TABLET ORAL at 08:14

## 2024-10-27 RX ADMIN — INSULIN GLARGINE 20 UNITS: 100 INJECTION, SOLUTION SUBCUTANEOUS at 21:09

## 2024-10-27 RX ADMIN — FUROSEMIDE 20 MG: 20 TABLET ORAL at 08:14

## 2024-10-27 RX ADMIN — INSULIN LISPRO 14 UNITS: 100 INJECTION, SOLUTION INTRAVENOUS; SUBCUTANEOUS at 08:13

## 2024-10-27 NOTE — PROGRESS NOTES
Cardiology Progress Note    Patient Identification:  Name: Grace Renee  Age: 74 y.o.  Sex: female  :  1950  MRN: [id number]                 Follow Up / Chief Complaint: Jaw pain, headache, uncontrolled hypertension  Chief Complaint   Patient presents with    Headache       Interval History: Patient presented to the emergency room with headache and jaw pain was found to have uncontrolled hypertension.  Patient underwent Lexiscan Cardiolite 10/26/2024 which was negative for ischemia EF of 61%       Subjective: Patient denies any chest pain today.       Objective:    10/26/2024: HS troponin is 33, glucose 315, white count elevated at 15.7.  10/27/2024: Glucose elevated, creatinine 1.26, white count elevated at 15    History of present illness:      Ms. Grace Renee has a PMH of      - Hypertension   - Dyslipidemia  - diabetes   - Pulmonary emboli s/p mechanical thrombectomy 2021  - CONNOR   - GERD  - Arthritis   - Obesity, BMI 49   - no allergies         Presented to the ED with headache and jaw pain.  Patient reports that she was here in the hospital spending the night with her sister when she woke up around 2 AM with severe headache as well as jaw pain and hip pain.  Patient was significantly hypertensive and placed on cardene drip.  She also notes that she has some broken teeth in her mouth and has had problems with gum swelling in the past, she thought that maybe she was getting an infection.  She reports that her headache and jaw pain have resolved and now she still has significant pain.   She denies any chest pain or shortness of breath.  She does note that on the way to the hospital to visit her sister that she had a hard time seeing her vision was blurry, which is now resolved.          Labs in the ED showed HS troponin 17, creatinine 1.11, WBC 12.8 sed rate 106 otherwise unremarkable.  EKG normal sinus rhythm. CT head negative            Assessment:  :     Hypertensive emergency  Elevated  troponin at 17  Dyslipidemia  Diabetes, with A1c of 11.2  History of PE and anticoagulation, INR therapeutic at 2.38  Obesity with BMI over 49        Recommendations / Plan:         Patient presented with headache while visiting family in the hospital was found to have significantly elevated blood pressure.  Patient is currently admitted to PCU in telemetry  Patient was hypertensive requiring IV Cardene.  Now is off IV Cardene.  Patient had an echocardiogram 10/25/2024 which revealed EF of 61 to 65% with mild left atrial enlargement and mild aortic stenosis  Patient's troponin is mildly elevated at 17.  Will repeat troponin.  EKG 10/25/2024 reviewed/interpreted by me reveals sinus rhythm with a rate of 72 bpm  Lexiscan Cardiolite 10/26/2024 was negative for ischemia EF of 61%  Patient would benefit from diabetes control  Will defer evaluation and therapy of leukocytosis to primary team.  Patient had CT head 10/25/2024 which was negative.  Does not have any further HA.  Patient's sed rate is elevated at 106, but HS troponin is normal at 0.36.  Appreciated hospitalist input being worked up for temporal arteritis..  Will change lisinopril to ARB         Copied text in this portion of the note has been reviewed and is accurate as of 10/27/2024    Past Medical History:  Past Medical History:   Diagnosis Date    Diabetes mellitus     Elevated cholesterol     GERD (gastroesophageal reflux disease)     Hypertension     Osteoarthritis of left hip 11/6/2021    Sleep apnea      Past Surgical History:  Past Surgical History:   Procedure Laterality Date    CARDIAC CATHETERIZATION Bilateral 11/2/2021    Procedure: Percutaneous Mechanical Thrombectomy- [facility];  Surgeon: Chris Pritchett MD;  Location: [facility] CATH INVASIVE LOCATION;  Service: Cardiovascular;  Laterality: Bilateral;    CARDIAC CATHETERIZATION N/A 11/2/2021    Procedure: Right Heart Cath;  Surgeon: Chris Pritchett MD;  Location: [facility] CATH INVASIVE LOCATION;   "Service: Cardiovascular;  Laterality: N/A;    INTERVENTIONAL RADIOLOGY PROCEDURE Bilateral 11/2/2021    Procedure: Pulmonary Angiogram;  Surgeon: Chris Pritchett MD;  Location: [facility] INVASIVE LOCATION;  Service: Cardiovascular;  Laterality: Bilateral;        Social History:   Social History     Tobacco Use    Smoking status: Never    Smokeless tobacco: Never   Substance Use Topics    Alcohol use: Not Currently      Family History:  History reviewed. No pertinent family history.       Allergies:  No Known Allergies  Scheduled Meds:  allopurinol, 100 mg, Nightly  amLODIPine, 5 mg, Q24H  atorvastatin, 20 mg, Daily  furosemide, 20 mg, Daily  insulin glargine, 20 Units, Nightly  insulin lispro, 14 Units, TID With Meals  insulin lispro, 3-14 Units, 4x Daily AC & at Bedtime  lisinopril, 5 mg, BID  metoprolol succinate XL, 50 mg, Q24H  pantoprazole, 40 mg, Q AM  potassium chloride, 10 mEq, Daily  sodium chloride, 10 mL, Q12H  warfarin, 7.5 mg, Daily          Review of Systems:   ROS        Constitutional:  Temp:  [97.7 °F (36.5 °C)-97.9 °F (36.6 °C)] 97.9 °F (36.6 °C)  Heart Rate:  [64-85] 68  Resp:  [13-18] 13  BP: (136-178)/(49-81) 167/67    Physical Exam   /67 (BP Location: Right arm, Patient Position: Lying)   Pulse 68   Temp 97.9 °F (36.6 °C) (Oral)   Resp 13   Ht 167.6 cm (66\")   Wt 136 kg (300 lb)   SpO2 97%   BMI 48.42 kg/m²   General:  Appears in no acute distress  Eyes: Sclera is anicteric,  conjunctiva is clear   HEENT:  No JVD. Thyroid not visibly enlarged. No mucosal pallor or cyanosis  Respiratory: Respirations regular and unlabored at rest.  Clear to auscultation  Cardiovascular: S1,S2 Regular rate and rhythm.    Gastrointestinal: Abdomen nondistended.  Musculoskeletal:  No abnormal movements  Extremities: No digital clubbing or cyanosis  Skin: Color pink.   Neuro: Alert and awake.    INTAKE AND OUTPUT:    Intake/Output Summary (Last 24 hours) at 10/27/2024 0614  Last data filed at " 10/26/2024 2044  Gross per 24 hour   Intake 980 ml   Output --   Net 980 ml       Cardiographics  Telemetry: Sinus    ECG:   ECG 12 Lead Other; Headache   Final Result   HEART RATE=72  bpm   RR Coplxzvb=408  ms   ME Vqhgtnxa=499  ms   P Horizontal Axis=-59  deg   P Front Axis=-77  deg   QRSD Interval=89  ms   QT Sosvvkbo=865  ms   KItK=055  ms   QRS Axis=23  deg   T Wave Axis=65  deg   - OTHERWISE NORMAL ECG -   Sinus or ectopic atrial rhythm   When compared with ECG of 08-Nov-2021 13:24:37,   Significant change in rhythm   Electronically Signed By: Kenan Gilbert (HANNAH) 2024-10-25 06:45:36   Date and Time of Study:2024-10-25 03:34:34      Telemetry Scan   Final Result      Telemetry Scan   Final Result      Telemetry Scan   Final Result      Telemetry Scan   Final Result      Telemetry Scan   Final Result      Telemetry Scan   Final Result      Telemetry Scan   Final Result      Telemetry Scan   Final Result        I have personally reviewed EKG    Echocardiogram: Results for orders placed during the hospital encounter of 10/25/24    Adult Transthoracic Echo Complete W/ Cont if Necessary Per Protocol    Interpretation Summary    Left ventricular ejection fraction appears to be 61 - 65%.    Left ventricular diastolic function was normal.    The left atrial cavity is mildly dilated.    Mild aortic valve stenosis is present.      Lab Review   I have reviewed the labs  Results from last 7 days   Lab Units 10/26/24  0552 10/26/24  0144 10/25/24  0419   HSTROP T ng/L 33* 39* 17*         Results from last 7 days   Lab Units 10/27/24  0309   SODIUM mmol/L 132*   POTASSIUM mmol/L 5.5*   BUN mg/dL 34*   CREATININE mg/dL 1.26*   CALCIUM mg/dL 9.0         Results from last 7 days   Lab Units 10/27/24  0309 10/26/24  0144 10/25/24  0419   WBC 10*3/mm3 15.48* 15.70* 12.80*   HEMOGLOBIN g/dL 11.9* 11.4* 13.5   HEMATOCRIT % 39.4 36.5 44.9   PLATELETS 10*3/mm3 236 259 242     Results from last 7 days   Lab Units 10/25/24  0419   INR  " 2.38       RADIOLOGY:  Imaging Results (Last 24 Hours)       ** No results found for the last 24 hours. **                  )10/27/2024  MD MAXI Hennessy Dragon/Transcription:   \"Dictated utilizing Dragon dictation\".   "

## 2024-10-27 NOTE — PLAN OF CARE
Goal Outcome Evaluation:  Plan of Care Reviewed With: patient        Progress: improving  Outcome Evaluation: Pt has been sleeping in her chair most of the day. Pt denies any headaches, blood pressure is still somewhat elevated, Hydralazine has been added per Hospitalist and Valsartan per Cardiologist. Pt has been able to get up and walk around with per personal walker. Pt blood sugars are elevated, but getting better now that steroids have been discontinued. Call light within reach, chair alarm in place.

## 2024-10-27 NOTE — PROGRESS NOTES
[facility] MEDICINE SERVICE  DAILY PROGRESS NOTE    NAME: Grace Renee  : 1950  MRN: [id number]      LOS: 2 days     PROVIDER OF SERVICE: Jhonny Munguia MD    Chief Complaint: Hypertensive urgency    Subjective:     Interval History:  History taken from: Patient and patient's chart     Denies any new complaints.  Denies any headache or chest pain.        Review of Systems:   Review of Systems  All negative except above  Objective:     Vital Signs  Temp:  [97.6 °F (36.4 °C)-97.9 °F (36.6 °C)] 97.6 °F (36.4 °C)  Heart Rate:  [62-85] 62  Resp:  [13-18] 15  BP: (136-178)/(62-81) 170/67   Body mass index is 48.42 kg/m².    Physical Exam  Physical Exam  General: Alert and oriented, no acute distress.  HENT: Normocephalic, moist oral mucosa, no scleral icterus.  Neck: Supple, nontender, no carotid bruits, no JVD, no LAD.  Lungs: Clear to auscultation, nonlabored respiration.  Heart: RRR, no murmur, gallop or edema.  Abdomen: Soft, nontender, nondistended, + bowel sounds.  Musculoskeletal: Normal range of motion and strength, no tenderness or swelling.  Neuro: alert and awake, moving all 4 extremities   Skin: Skin is warm, dry and pink, no rashes or lesions.  Psychiatric: Cooperative, appropriate mood and affect.         Diagnostic Data    Results from last 7 days   Lab Units 10/27/24  0309   WBC 10*3/mm3 15.48*   HEMOGLOBIN g/dL 11.9*   HEMATOCRIT % 39.4   PLATELETS 10*3/mm3 236   GLUCOSE mg/dL 311*   CREATININE mg/dL 1.26*   BUN mg/dL 34*   SODIUM mmol/L 132*   POTASSIUM mmol/L 5.5*   ANION GAP mmol/L 10.8       No radiology results for the last day      I have reviewed patient labs and imaging     Assessment/Plan:     Active and Resolved Problems  Hypertensive urgency  History of hypertension  Initial systolic greater than 200  Troponin 17  EKG sinus rhythm rate 72, no evidence of acute  Echo 10/25/2024 with EF 61-65%  off Cardene drip  On furosemide 20 mg, lisinopril 5 mg twice daily,  metoprolol XL 50 mg  Increase tablet amlodipine to 10 mg  Monitor blood pressure and adjust medication accordingly     Headache, left temporal region  Elevated sedimentation rate  WBC 12.8, , concern for temporal arteritis  CT head without acute findings  Discussed with neurology-presentation not typical of temporal arteritis.  Will discontinue IV steroids  Discussed with vascular, agreed with neurology.  No further plan for temporal artery biopsy  Neurology following     Left hip pain  History of osteoarthritis  X-ray of pelvis with severe left and moderate right hip joint degeneration  As needed pain medication     Diabetes mellitus type 2  Anticipate hyperglycemia in setting of exogenous steroids  , steroid hyperglycemia protocol ordered  SSI  Consistent carb diet  Hypoglycemia protocol  Diabetic educator consult     History of PE  INR goal 2-3  Continue warfarin, pharmacy to dose     Hyperlipidemia  Continue Lipitor     Gout  Continue allopurinol     GERD  Continue PPI  VTE Prophylaxis:  Pharmacologic VTE prophylaxis orders are present.             Disposition Planning:     Barriers to Discharge: Medical clearance  Anticipated Date of Discharge: 10/28  Place of Discharge: Home      Time: 40 minutes     Code Status and Medical Interventions: CPR (Attempt to Resuscitate); Full Support   Ordered at: 10/25/24 0523     Code Status (Patient has no pulse and is not breathing):    CPR (Attempt to Resuscitate)     Medical Interventions (Patient has pulse or is breathing):    Full Support       Signature: Electronically signed by Jhonny Munguia MD, 10/27/24, 09:36 EDT.  [facility] Hospitalist Team

## 2024-10-27 NOTE — PROGRESS NOTES
Patient has improved    No new issues    I agree with the vascular team that I do not think this is temporal arteritis    Continue present plan    Call if needed

## 2024-10-27 NOTE — CASE MANAGEMENT/SOCIAL WORK
Continued Stay Note   [facility]     Patient Name: Grace Renee  MRN: [id number]  Today's Date: 10/27/2024    Admit Date: 10/25/2024    Plan: Plan to return home with  PT. [facility] referral in process.   Discharge Plan       Row Name 10/27/24 1908       Plan    Plan Plan to return home with  PT. [facility] referral in process.    Provided Post Acute Provider List? Yes    Post Acute Provider List [facility] PT    Delivered To Patient    Method of Delivery In person    Plan Comments CM met with patient at bedside and delivered HH list for PT. Patient requested a referral to [facility]. CM sent basket referral and called agency regarding referral and to obtain fax number. CM left message.                Lesvia Mcgrath RN    Office: [phone]  Fax: [phone]

## 2024-10-27 NOTE — PLAN OF CARE
Goal Outcome Evaluation:  Plan of Care Reviewed With: patient      A known DM patient A&O X 4 who had high blood glucose last night, on SSI and glargine. Methylprednisolone was Dc'd yesterday and started on low dose of SSI and glargine. At 2012, the blood glucose was 436 and given 7 units of humalog and 20 u of glargine . At 2336, repeat glucose is 321. The physician was consulted and humalog 5 units given SQ. This AM is 311. She has been observed to sleep more on the  than on the bed. She has been assisted to the bathroom several times and no episodes of dizziness verbalized. Call light has been available to her,

## 2024-10-27 NOTE — PROGRESS NOTES
"Pharmacy dosing service  Anticoagulant  Warfarin     Subjective:    Grace Renee is a 74 y.o.female being continued on warfarin for History of DVT/PE.    INR Goal: 2 - 3  Home medication?: warfarin 7.5 mg PO daily  Bridge Therapy Present?:  No  Interacting Medications Evaluation (New/Present/Discontinued): n/a  Additional Contributing Factors: hip pain (severe pain/stress in general can increase INR)      Assessment/Plan:    INR is therapeutic again today, will continue home regimen.     Continue to monitor and adjust based on INR.         Date 10/25 10/26 10/27         INR 2.38  2.23         Dose 7.5mg 7.5 mg 7.5mg             Objective:  [Ht: 167.6 cm (66\"); Wt: 136 kg (300 lb); BMI: Body mass index is 48.42 kg/m².]    Lab Results   Component Value Date    ALBUMIN 3.7 08/21/2024     Lab Results   Component Value Date    INR 2.23 10/27/2024    INR 2.38 10/25/2024    INR 1.1 08/21/2024    PROTIME 22.9 10/27/2024    PROTIME 24.3 10/25/2024    PROTIME 12.9 08/21/2024     Lab Results   Component Value Date    HGB 11.9 (L) 10/27/2024    HGB 11.4 (L) 10/26/2024    HGB 13.5 10/25/2024     Lab Results   Component Value Date    HCT 39.4 10/27/2024    HCT 36.5 10/26/2024    HCT 44.9 10/25/2024       Jarett Dempsey [facility]  10/27/24 15:52 EDT       "

## 2024-10-28 ENCOUNTER — DOCUMENTATION (OUTPATIENT)
Dept: HOME HEALTH SERVICES | Facility: HOME HEALTHCARE | Age: 74
End: 2024-10-28
Payer: COMMERCIAL

## 2024-10-28 ENCOUNTER — TRANSCRIBE ORDERS (OUTPATIENT)
Dept: HOME HEALTH SERVICES | Facility: HOME HEALTHCARE | Age: 74
End: 2024-10-28
Payer: COMMERCIAL

## 2024-10-28 VITALS
BODY MASS INDEX: 47.09 KG/M2 | OXYGEN SATURATION: 98 % | WEIGHT: 293 LBS | TEMPERATURE: 97.5 F | HEART RATE: 69 BPM | DIASTOLIC BLOOD PRESSURE: 67 MMHG | SYSTOLIC BLOOD PRESSURE: 141 MMHG | RESPIRATION RATE: 16 BRPM | HEIGHT: 66 IN

## 2024-10-28 DIAGNOSIS — I16.0 HYPERTENSIVE URGENCY: Primary | ICD-10-CM

## 2024-10-28 LAB
ANION GAP SERPL CALCULATED.3IONS-SCNC: 8.6 MMOL/L (ref 5–15)
BUN SERPL-MCNC: 31 MG/DL (ref 8–23)
BUN/CREAT SERPL: 20.8 (ref 7–25)
CALCIUM SPEC-SCNC: 8.6 MG/DL (ref 8.6–10.5)
CHLORIDE SERPL-SCNC: 101 MMOL/L (ref 98–107)
CO2 SERPL-SCNC: 28.4 MMOL/L (ref 22–29)
CREAT SERPL-MCNC: 1.49 MG/DL (ref 0.57–1)
DEPRECATED RDW RBC AUTO: 45.7 FL (ref 37–54)
EGFRCR SERPLBLD CKD-EPI 2021: 36.9 ML/MIN/1.73
ERYTHROCYTE [DISTWIDTH] IN BLOOD BY AUTOMATED COUNT: 14.2 % (ref 12.3–15.4)
GLUCOSE BLDC GLUCOMTR-MCNC: 195 MG/DL (ref 70–105)
GLUCOSE BLDC GLUCOMTR-MCNC: 197 MG/DL (ref 70–105)
GLUCOSE SERPL-MCNC: 185 MG/DL (ref 65–99)
HCT VFR BLD AUTO: 38.3 % (ref 34–46.6)
HGB BLD-MCNC: 11.7 G/DL (ref 12–15.9)
INR PPP: 2.09 (ref 2–3)
MCH RBC QN AUTO: 26.7 PG (ref 26.6–33)
MCHC RBC AUTO-ENTMCNC: 30.5 G/DL (ref 31.5–35.7)
MCV RBC AUTO: 87.2 FL (ref 79–97)
PLATELET # BLD AUTO: 273 10*3/MM3 (ref 140–450)
PMV BLD AUTO: 11 FL (ref 6–12)
POTASSIUM SERPL-SCNC: 4.2 MMOL/L (ref 3.5–5.2)
PROTHROMBIN TIME: 21.6 SECONDS (ref 19.4–28.5)
RBC # BLD AUTO: 4.39 10*6/MM3 (ref 3.77–5.28)
SODIUM SERPL-SCNC: 138 MMOL/L (ref 136–145)
WBC NRBC COR # BLD AUTO: 16.28 10*3/MM3 (ref 3.4–10.8)

## 2024-10-28 PROCEDURE — 85610 PROTHROMBIN TIME: CPT | Performed by: STUDENT IN AN ORGANIZED HEALTH CARE EDUCATION/TRAINING PROGRAM

## 2024-10-28 PROCEDURE — 63710000001 INSULIN LISPRO (HUMAN) PER 5 UNITS: Performed by: STUDENT IN AN ORGANIZED HEALTH CARE EDUCATION/TRAINING PROGRAM

## 2024-10-28 PROCEDURE — 99232 SBSQ HOSP IP/OBS MODERATE 35: CPT | Performed by: INTERNAL MEDICINE

## 2024-10-28 PROCEDURE — 85027 COMPLETE CBC AUTOMATED: CPT | Performed by: STUDENT IN AN ORGANIZED HEALTH CARE EDUCATION/TRAINING PROGRAM

## 2024-10-28 PROCEDURE — 63710000001 INSULIN LISPRO (HUMAN) PER 5 UNITS

## 2024-10-28 PROCEDURE — 82948 REAGENT STRIP/BLOOD GLUCOSE: CPT | Performed by: STUDENT IN AN ORGANIZED HEALTH CARE EDUCATION/TRAINING PROGRAM

## 2024-10-28 PROCEDURE — 80048 BASIC METABOLIC PNL TOTAL CA: CPT | Performed by: STUDENT IN AN ORGANIZED HEALTH CARE EDUCATION/TRAINING PROGRAM

## 2024-10-28 RX ORDER — METOPROLOL SUCCINATE 50 MG/1
50 TABLET, EXTENDED RELEASE ORAL
Qty: 30 TABLET | Refills: 0 | Status: SHIPPED | OUTPATIENT
Start: 2024-10-29 | End: 2024-11-28

## 2024-10-28 RX ORDER — HYDRALAZINE HYDROCHLORIDE 50 MG/1
50 TABLET, FILM COATED ORAL EVERY 8 HOURS SCHEDULED
Qty: 90 TABLET | Refills: 0 | Status: SHIPPED | OUTPATIENT
Start: 2024-10-28 | End: 2024-11-27

## 2024-10-28 RX ORDER — AMLODIPINE BESYLATE 10 MG/1
10 TABLET ORAL
Qty: 30 TABLET | Refills: 0 | Status: SHIPPED | OUTPATIENT
Start: 2024-10-29 | End: 2024-11-28

## 2024-10-28 RX ORDER — VALSARTAN 160 MG/1
160 TABLET ORAL
Qty: 30 TABLET | Refills: 0 | Status: SHIPPED | OUTPATIENT
Start: 2024-10-29 | End: 2024-11-28

## 2024-10-28 RX ADMIN — FUROSEMIDE 20 MG: 20 TABLET ORAL at 08:25

## 2024-10-28 RX ADMIN — HYDRALAZINE HYDROCHLORIDE 50 MG: 25 TABLET ORAL at 13:02

## 2024-10-28 RX ADMIN — INSULIN LISPRO 3 UNITS: 100 INJECTION, SOLUTION INTRAVENOUS; SUBCUTANEOUS at 13:03

## 2024-10-28 RX ADMIN — HYDRALAZINE HYDROCHLORIDE 50 MG: 25 TABLET ORAL at 05:33

## 2024-10-28 RX ADMIN — METOPROLOL SUCCINATE 50 MG: 50 TABLET, EXTENDED RELEASE ORAL at 08:25

## 2024-10-28 RX ADMIN — PANTOPRAZOLE SODIUM 40 MG: 40 TABLET, DELAYED RELEASE ORAL at 05:33

## 2024-10-28 RX ADMIN — AMLODIPINE BESYLATE 10 MG: 5 TABLET ORAL at 08:25

## 2024-10-28 RX ADMIN — ATORVASTATIN CALCIUM 20 MG: 20 TABLET, FILM COATED ORAL at 08:25

## 2024-10-28 RX ADMIN — INSULIN LISPRO 17 UNITS: 100 INJECTION, SOLUTION INTRAVENOUS; SUBCUTANEOUS at 13:04

## 2024-10-28 RX ADMIN — GABAPENTIN 300 MG: 300 CAPSULE ORAL at 02:15

## 2024-10-28 RX ADMIN — INSULIN LISPRO 3 UNITS: 100 INJECTION, SOLUTION INTRAVENOUS; SUBCUTANEOUS at 08:26

## 2024-10-28 RX ADMIN — VALSARTAN 160 MG: 80 TABLET, FILM COATED ORAL at 08:26

## 2024-10-28 RX ADMIN — POTASSIUM CHLORIDE 10 MEQ: 750 TABLET, EXTENDED RELEASE ORAL at 08:25

## 2024-10-28 RX ADMIN — INSULIN LISPRO 17 UNITS: 100 INJECTION, SOLUTION INTRAVENOUS; SUBCUTANEOUS at 08:26

## 2024-10-28 RX ADMIN — Medication 10 ML: at 08:27

## 2024-10-28 NOTE — PROGRESS NOTES
Demographics verified with patient. Pt is agreeable to services and has no other agency    Nursing and PT    Dr. Jani Sun will be the following provider. Pt has a follow up appt with him on 10/31    Pharmacy: [facility] in [place]

## 2024-10-28 NOTE — CASE MANAGEMENT/SOCIAL WORK
Case Management Discharge Note      [facility]    Provided Post Acute Provider List?: Yes  Post Acute Provider List: Home Health (HH PT)  Delivered To: Patient  Method of Delivery: In person    Selected Continued Care - Discharged on 10/28/2024 Admission date: 10/25/2024 - Discharge disposition: Home or Self Care        Home Medical Care Coordination complete.      Service Provider Services Address Phone Fax Patient Preferred    [facility] [address] [facility] [facility] [address] [phone] --               Transportation Services  Private: Car    Final Discharge Disposition Code: 06 - home with home health care   What Type Of Note Output Would You Prefer (Optional)?: Bullet Format How Severe Is Your Skin Lesion?: moderate Has Your Skin Lesion Been Treated?: been treated Is This A New Presentation, Or A Follow-Up?: Skin Lesion Additional History: Patient has used ketoconazole 2% shampoo for 4 months with no improvement

## 2024-10-28 NOTE — PLAN OF CARE
Problem: Adult Inpatient Plan of Care  Goal: Absence of Hospital-Acquired Illness or Injury  Intervention: Identify and Manage Fall Risk  Recent Flowsheet Documentation  Taken 10/28/2024 0600 by Alexandra Pearson RN  Safety Promotion/Fall Prevention:   assistive device/personal items within reach   clutter free environment maintained   fall prevention program maintained   nonskid shoes/slippers when out of bed   room organization consistent   safety round/check completed  Taken 10/28/2024 0401 by Alexandra Pearson RN  Safety Promotion/Fall Prevention:   assistive device/personal items within reach   clutter free environment maintained   fall prevention program maintained   nonskid shoes/slippers when out of bed   room organization consistent   safety round/check completed  Taken 10/28/2024 0200 by Alexandra Pearson RN  Safety Promotion/Fall Prevention:   assistive device/personal items within reach   clutter free environment maintained   fall prevention program maintained   nonskid shoes/slippers when out of bed   room organization consistent   safety round/check completed  Taken 10/28/2024 0001 by Alexandra Pearson RN  Safety Promotion/Fall Prevention:   assistive device/personal items within reach   clutter free environment maintained   fall prevention program maintained   nonskid shoes/slippers when out of bed   room organization consistent   safety round/check completed  Taken 10/27/2024 2200 by Alexandra Pearson RN  Safety Promotion/Fall Prevention:   assistive device/personal items within reach   clutter free environment maintained   room organization consistent   safety round/check completed  Taken 10/27/2024 2001 by Alexandra Pearson, RN  Safety Promotion/Fall Prevention:   assistive device/personal items within reach   clutter free environment maintained   fall prevention program maintained   nonskid shoes/slippers when out of bed   room organization consistent   safety round/check completed  Intervention:  Prevent Skin Injury  Recent Flowsheet Documentation  Taken 10/27/2024 2001 by Alexandra Pearson RN  Body Position: position changed independently  Skin Protection: incontinence pads utilized  Intervention: Prevent and Manage VTE (Venous Thromboembolism) Risk  Recent Flowsheet Documentation  Taken 10/28/2024 0401 by Alexandra Pearson RN  VTE Prevention/Management:   bilateral   SCDs (sequential compression devices) off   patient refused intervention  Taken 10/28/2024 0001 by Alexandra Pearson RN  VTE Prevention/Management:   bilateral   SCDs (sequential compression devices) off   patient refused intervention  Taken 10/27/2024 2001 by Alexandra Pearson RN  VTE Prevention/Management:   bilateral   SCDs (sequential compression devices) off   patient refused intervention  Intervention: Prevent Infection  Recent Flowsheet Documentation  Taken 10/28/2024 0001 by Alexandra Pearson RN  Infection Prevention:   environmental surveillance performed   hand hygiene promoted   personal protective equipment utilized   single patient room provided  Taken 10/27/2024 2200 by Alexandra Pearson RN  Infection Prevention:   environmental surveillance performed   hand hygiene promoted   personal protective equipment utilized   single patient room provided  Taken 10/27/2024 2001 by Alexandra Pearson RN  Infection Prevention:   environmental surveillance performed   hand hygiene promoted   personal protective equipment utilized   single patient room provided  Goal: Optimal Comfort and Wellbeing  Intervention: Provide Person-Centered Care  Recent Flowsheet Documentation  Taken 10/27/2024 2001 by Alexandra Pearson RN  Trust Relationship/Rapport:   care explained   choices provided   questions answered   questions encouraged   reassurance provided     Problem: Skin Injury Risk Increased  Goal: Skin Health and Integrity  Intervention: Optimize Skin Protection  Recent Flowsheet Documentation  Taken 10/27/2024 2001 by Alexandra Pearson  RN  Activity Management: up in chair  Pressure Reduction Techniques: frequent weight shift encouraged  Pressure Reduction Devices: pressure-redistributing mattress utilized  Skin Protection: incontinence pads utilized     Problem: Comorbidity Management  Goal: Blood Glucose Level Within Target Range  Intervention: Monitor and Manage Glycemia  Recent Flowsheet Documentation  Taken 10/27/2024 2001 by Alexandra Pearson RN  Medication Review/Management: medications reviewed  Goal: Blood Pressure in Desired Range  Intervention: Maintain Blood Pressure Management  Recent Flowsheet Documentation  Taken 10/27/2024 2001 by Alexandra Pearson RN  Medication Review/Management: medications reviewed  Goal: Maintenance of Osteoarthritis Symptom Control  Intervention: Maintain Osteoarthritis Symptom Control  Recent Flowsheet Documentation  Taken 10/27/2024 2001 by Alexandra Pearson RN  Activity Management: up in chair  Assistive Device Utilized: walker  Medication Review/Management: medications reviewed     Problem: Hypertension Acute  Goal: Blood Pressure Within Desired Range  Intervention: Normalize Blood Pressure  Recent Flowsheet Documentation  Taken 10/27/2024 2001 by Alexandra Pearson RN  Medication Review/Management: medications reviewed     Problem: Fall Injury Risk  Goal: Absence of Fall and Fall-Related Injury  Intervention: Identify and Manage Contributors  Recent Flowsheet Documentation  Taken 10/27/2024 2001 by Alexandra Pearson RN  Medication Review/Management: medications reviewed  Intervention: Promote Injury-Free Environment  Recent Flowsheet Documentation  Taken 10/28/2024 0600 by Alexandra Pearson RN  Safety Promotion/Fall Prevention:   assistive device/personal items within reach   clutter free environment maintained   fall prevention program maintained   nonskid shoes/slippers when out of bed   room organization consistent   safety round/check completed  Taken 10/28/2024 0401 by Alexandra Pearson, RN  Safety  Promotion/Fall Prevention:   assistive device/personal items within reach   clutter free environment maintained   fall prevention program maintained   nonskid shoes/slippers when out of bed   room organization consistent   safety round/check completed  Taken 10/28/2024 0200 by Alexandra Pearson RN  Safety Promotion/Fall Prevention:   assistive device/personal items within reach   clutter free environment maintained   fall prevention program maintained   nonskid shoes/slippers when out of bed   room organization consistent   safety round/check completed  Taken 10/28/2024 0001 by Alexandra Pearson RN  Safety Promotion/Fall Prevention:   assistive device/personal items within reach   clutter free environment maintained   fall prevention program maintained   nonskid shoes/slippers when out of bed   room organization consistent   safety round/check completed  Taken 10/27/2024 2200 by Alexandra Pearson RN  Safety Promotion/Fall Prevention:   assistive device/personal items within reach   clutter free environment maintained   room organization consistent   safety round/check completed  Taken 10/27/2024 2001 by Alexandra Pearson RN  Safety Promotion/Fall Prevention:   assistive device/personal items within reach   clutter free environment maintained   fall prevention program maintained   nonskid shoes/slippers when out of bed   room organization consistent   safety round/check completed   Goal Outcome Evaluation:

## 2024-10-28 NOTE — CASE MANAGEMENT/SOCIAL WORK
Continued Stay Note  [facility]     Patient Name: Grace Renee  MRN: [id number]  Today's Date: 10/28/2024    Admit Date: 10/25/2024    Plan: Return home with [facility]   Discharge Plan       Row Name 10/28/24 1123       Plan    Plan Return home with [facility]    Plan Comments [facility] liaison Stacy updated that patient is accepted with [facility] for start date of 10/31      Row Name 10/28/24 1054       Plan    Plan Return home with [facility] (Pending)    Plan Comments CM called [facility] and was transferred from [facility] to [facility]. After speaking to the patient and the liaison Matt, their services are for caregivers only. CM provided the patient a new [facility] list at bedside and she chose [facility] and [facility] liaison Stacy. Pending acceptance.               Taisha Ocampo RN      [facility]  Phone # [phone]  Fax # [phone]

## 2024-10-28 NOTE — CONSULTS
"Diabetes Education  Assessment/Teaching    Patient Name:  Grace Renee  YOB: 1950  MRN: [id number]  Admit Date:  10/25/2024      Assessment Date:  10/28/2024  Flowsheet Row Most Recent Value   General Information     Referral From: MD order  [Consult received due to A1c >7%. A1c this adm 10.9%. Adm bs 138.]   Height 167.6 cm (66\")   Height Method Stated   Weight 136 kg (300 lb)   Weight Method Bed scale   Pregnancy Assessment    Diabetes History    What type of diabetes do you have? Type 2   Length of Diabetes Diagnosis --  [Pt with 20+ year hx of diabetes.]   Have you had diabetes education/teaching in the past? yes   When and where was your diabetes education? Pt has attended diabetes classes at [facility] in the past.   Do you test your blood sugar at home? yes   Frequency of checks 3-4 times/day   Meter type Cuff-Protectongo meter from work, about 3 years old   Who performs the test? self   Have you had low blood sugar? (<70mg/dl) yes   How often do you have low blood sugar? occasionally   Education Preferences    Nutrition Information    Assessment Topics    DM Goals             Flowsheet Row Most Recent Value   DM Education Needs    Meter Has own   Frequency of Testing AC/HS  [Discussed importance of recording bs and taking report to PCP visits. Pt states she does take bs meter with her to appts and MD reviews averages.]   Blood Glucose Target Range Discussed A1c result of 10.9%. Discussed healthy bs range and healthy A1c target. Discussed importance of bs control.   Medication Insulin  [Pt taking Humulin 70/30 50 units am and 30 units pm. Pt states does not always take evening shot before supper. Discussed importance of taking insulin about 30 minutes before breakfast and supper.]   Problem Solving Hypoglycemia, Hyperglycemia, Signs, Symptoms, Treatment  [Pt states she does keep glucose tabs with her.]   Reducing Risks A1C testing  [Gave A1c info sheet.]   Healthy Eating --  [Pt usually eating 2 " meals/day. Pt states she and sister eat dinner out about 4-5 times/week. Discussed limiting portions and decreasing consumption of high CHO snacks/sweets.]   Motivation Engaged   Teaching Method Explanation, Discussion, Handouts   Patient Response Verbalized understanding              Other Comments:  Met with pt at bedside. Pt states has PCP and sees routinely. Pt has been receiving Lantus and Humalog in hospital. Pt states she sometimes does not want to eat when she gets up so unable to take am insulin since it has mealtime insulin in the injection. Discussed lantus and humalog pt receiving in hospital. Discussed if meals vary(frequency and timing), this may be a better insulin regimen to be on due to being able to take Lantus if not eating. Pt states she will discuss with PCP.     Pt states she does have her insulin and bs monitoring supplies. Pt states bs has been elevated due to she has been under more stress lately. Her sister  3 weeks ago and she was not allowed to attend . Pt states this is affecting her grieving process. Pt understands importance of bs control. Pt states she does not need diabetes education material and has no further questions.       Electronically signed by:  Rosalva Panda RN  10/28/24 12:58 EDT

## 2024-10-28 NOTE — CASE MANAGEMENT/SOCIAL WORK
Continued Stay Note  [facility]     Patient Name: Grace Renee  MRN: [id number]  Today's Date: 10/28/2024    Admit Date: 10/25/2024    Plan: Return home with [facility] (Pending)   Discharge Plan       Row Name 10/28/24 1054       Plan    Plan Return home with [facility] (Pending)    Plan Comments CM called [facility] and was transferred from [facility] to [facility]. After speaking to the patient and the liaison Matt, their services are for caregivers only. CM provided the patient a new C list at bedside and she chose [facility]. [facility] and [facility] liaison Stacy. Pending acceptance. MD notified to add [facility] order.                Taisha Ocampo RN     [facility]  Phone # [phone]  Fax # [phone]

## 2024-10-28 NOTE — DISCHARGE SUMMARY
"[facility] Medicine Services  Discharge Summary    Date of Service: 10/28/2024  Patient Name: Grace Renee  : 1950  MRN: [id number]    Date of Admission: 10/25/2024  Discharge Diagnosis: Hypertensive urgency  Date of Discharge: 10/28/2024  Primary Care Physician: Jani Sun MD      Presenting Problem:   Elevated sed rate [R70.0]  Left hip pain [M25.552]  Hypertensive urgency [I16.0]  Hypertensive emergency [I16.1]  Acute nonintractable headache, unspecified headache type [R51.9]    Active and Resolved Hospital Problems:  Active Hospital Problems    Diagnosis POA    **Hypertensive urgency [I16.0] Yes    Acute nonintractable headache [R51.9] Unknown    Elevated sed rate [R70.0] Unknown      Resolved Hospital Problems   No resolved problems to display.         Hospital Course     HPI:    \"Grace Renee is a 74 y.o. female with a CMH of diabetes mellitus, history of PE, HLD, HTN, CONNOR, GERD, osteoarthritis who presented to [facility] on 10/25/2024 with headache x 2 days.  Patient states that she was asleep when she suddenly woke up with a headache that lasted a few hours with associated left jaw pain. She localizes pain to left temporal region.  Also reports left-sided blurry vision with few hours prior.  Reports that headache is not recurred. Denies neck pain. Denies visual changes, nausea, vomiting, unilateral weakness or paresthesias of extremities.  Denies history of headaches.  She also endorses left groin/hip pain but reports history of arthritis.      In the ED, patient was noted to be hypertensive with systolics greater than 200.  Labs pertinent for elevated sed rate of 106. Troponin 17. Mild leukocytosis WBC 12.8. INR therapeutic at 2.3. EKG was sinus rhythm rate of 72, no evidence of acute ischemia. CT head without acute intracranial abnormalities.  Pelvis x-ray with no acute findings as well.  Patient was started on Cardene drip as well as Solu-Medrol for " concerns of giant cell arteritis.  Hospital service to admit for further management.    Hospital Course:  Hypertensive urgency  History of hypertension  Initial systolic greater than 200  Troponin 17  EKG sinus rhythm rate 72, no evidence of acute  Echo 10/25/2024 with EF 61-65%  off Cardene drip  On furosemide 20 mg, metoprolol XL 50 mg  Increase tablet amlodipine to 10 mg  Lisinopril 5 mg changed to Diovan 160 mg and hydralazine 50 mg q8 added   Monitor blood pressure and adjust medication accordingly     Headache, left temporal region  Elevated sedimentation rate  WBC 12.8, , concern for temporal arteritis  CT head without acute findings  Discussed with neurology-presentation not typical of temporal arteritis.  Will discontinue IV steroids  Discussed with vascular, agreed with neurology.  No further plan for temporal artery biopsy  Neurology following- no suspicion of Temporal arteritis.      Left hip pain  History of osteoarthritis  X-ray of pelvis with severe left and moderate right hip joint degeneration  As needed pain medication     Diabetes mellitus type 2  Anticipate hyperglycemia in setting of exogenous steroids  , steroid hyperglycemia protocol ordered  SSI  Consistent carb diet  Hypoglycemia protocol  Diabetic educator consult  Resume home regimen at discharge      History of PE  INR goal 2-3  Continue home warfarin      Hyperlipidemia  Continue Lipitor     Gout  Continue allopurinol     GERD  Continue PPI    Leucocytosis likely reactive after steroid use. No sign of infection, monitor off antibiotics and follow up with primary care provider.      DISCHARGE Follow Up Recommendations for labs and diagnostics:   Monitor BP and keep log.  Follow up with primary care provider within 3 days of this discharge.   Monitor CBC and BMP as outpatient.         Day of Discharge     Vital Signs:  Temp:  [97.3 °F (36.3 °C)-98.4 °F (36.9 °C)] 97.5 °F (36.4 °C)  Heart Rate:  [56-71] 62  Resp:  [13-16]  16  BP: (128-177)/(53-83) 128/83          Pertinent  and/or Most Recent Results     LAB RESULTS:      Lab 10/28/24  0204 10/27/24  1347 10/27/24  0309 10/26/24  0144 10/25/24  0419   WBC 16.28*  --  15.48* 15.70* 12.80*   HEMOGLOBIN 11.7*  --  11.9* 11.4* 13.5   HEMATOCRIT 38.3  --  39.4 36.5 44.9   PLATELETS 273  --  236 259 242   NEUTROS ABS  --   --   --   --  5.22   IMMATURE GRANS (ABS)  --   --   --   --  0.03   LYMPHS ABS  --   --   --   --  5.81*   MONOS ABS  --   --   --   --  0.99*   EOS ABS  --   --   --   --  0.61*   MCV 87.2  --  87.0 86.7 89.3   SED RATE  --   --   --   --  106*   CRP  --   --   --   --  0.36   PROTIME 21.6 22.9  --   --  24.3         Lab 10/28/24  0204 10/27/24  1724 10/27/24  0309 10/26/24  0144 10/25/24  0419   SODIUM 138  --  132* 138 139   POTASSIUM 4.2 4.1 5.5* 4.6 3.9   CHLORIDE 101  --  98 103 100   CO2 28.4  --  23.2 24.2 28.3   ANION GAP 8.6  --  10.8 10.8 10.7   BUN 31*  --  34* 29* 19   CREATININE 1.49*  --  1.26* 1.27* 1.11*   EGFR 36.9*  --  45.2* 44.7* 52.6*   GLUCOSE 185*  --  311* 151* 145*   CALCIUM 8.6  --  9.0 8.8 9.7   HEMOGLOBIN A1C  --   --  10.90*  --   --              Lab 10/28/24  0204 10/27/24  1347 10/26/24  0552 10/26/24  0144 10/25/24  0419   PROBNP  --   --   --   --  153.0   HSTROP T  --   --  33* 39* 17*   PROTIME 21.6 22.9  --   --  24.3   INR 2.09 2.23  --   --  2.38                 Brief Urine Lab Results       None          Microbiology Results (last 10 days)       Procedure Component Value - Date/Time    Blood Culture - Blood, Arm, Left [[id number]]  (Normal) Collected: 10/26/24 0014    Lab Status: Preliminary result Specimen: Blood from Arm, Left Updated: 10/28/24 0030     Blood Culture No growth at 2 days    Narrative:      Less than seven (7) mL's of blood was collected.  Insufficient quantity may yield false negative results.    Blood Culture - Blood, Arm, Left [[id number]]  (Normal) Collected: 10/25/24 1531    Lab Status: Preliminary result  Specimen: Blood from Arm, Left Updated: 10/27/24 1545     Blood Culture No growth at 2 days            XR Hip With or Without Pelvis 2 - 3 View Left    Result Date: 10/25/2024  Impression: Impression: Severe left and moderate right hip joint degeneration. Electronically Signed: Otis Shine MD  10/25/2024 5:28 AM EDT  Workstation ID: [id number]    CT Head Without Contrast    Result Date: 10/25/2024  Impression: Impression: No acute intracranial abnormality. Electronically Signed: Otis Shine MD  10/25/2024 4:10 AM EDT  Workstation ID: [id number]     Results for orders placed during the hospital encounter of 11/01/21    Duplex Venous Lower Extremity - Bilateral CAR    Interpretation Summary  · Limited, incomplete bilateral lower extremity venous duplex scan due to right groin bandages, inability of patient to cooperate with exam and intolerance to compression maneuvers.  · There is no deep or superficial vein thrombosis in imaged vein segments in the lower extremities.      Results for orders placed during the hospital encounter of 11/01/21    Duplex Venous Lower Extremity - Bilateral CAR    Interpretation Summary  · Limited, incomplete bilateral lower extremity venous duplex scan due to right groin bandages, inability of patient to cooperate with exam and intolerance to compression maneuvers.  · There is no deep or superficial vein thrombosis in imaged vein segments in the lower extremities.      Results for orders placed during the hospital encounter of 10/25/24    Adult Transthoracic Echo Complete W/ Cont if Necessary Per Protocol    Interpretation Summary    Left ventricular ejection fraction appears to be 61 - 65%.    Left ventricular diastolic function was normal.    The left atrial cavity is mildly dilated.    Mild aortic valve stenosis is present.      Labs Pending at Discharge:  Pending Labs       Order Current Status    Blood Culture - Blood, Arm, Left Preliminary result    Blood Culture - Blood, Arm, Left  Preliminary result                     Consults:   Consults       Date and Time Order Name Status Description    10/26/2024  8:59 AM Inpatient Neurology Consult General Completed     10/25/2024 11:41 AM Inpatient Vascular Surgery Consult Completed     10/25/2024  5:23 AM Inpatient Cardiology Consult Completed     10/25/2024  5:04 AM Hospitalist (on-call MD unless specified)                Discharge Details        Discharge Medications        New Medications        Instructions Start Date   amLODIPine 10 MG tablet  Commonly known as: NORVASC   10 mg, Oral, Every 24 Hours Scheduled   Start Date: October 29, 2024     hydrALAZINE 50 MG tablet  Commonly known as: APRESOLINE   50 mg, Oral, Every 8 Hours Scheduled      metoprolol succinate XL 50 MG 24 hr tablet  Commonly known as: TOPROL-XL   50 mg, Oral, Every 24 Hours Scheduled   Start Date: October 29, 2024     valsartan 160 MG tablet  Commonly known as: DIOVAN   160 mg, Oral, Every 24 Hours Scheduled   Start Date: October 29, 2024            Continue These Medications        Instructions Start Date   allopurinol 100 MG tablet  Commonly known as: ZYLOPRIM   100 mg, Nightly      atorvastatin 20 MG tablet  Commonly known as: LIPITOR   20 mg, Daily      calcium carbonate 500 MG chewable tablet  Commonly known as: TUMS   1 tablet, Oral, 3 Times Daily PRN      furosemide 20 MG tablet  Commonly known as: LASIX   20 mg, Oral, Daily      gabapentin 300 MG capsule  Commonly known as: NEURONTIN   300 mg, 3 Times Daily PRN      HumuLIN 70/30 KwikPen (70-30) 100 UNIT/ML suspension pen-injector  Generic drug: Insulin NPH Isophane & Regular   50 Units, Every Morning      HumuLIN 70/30 KwikPen (70-30) 100 UNIT/ML suspension pen-injector  Generic drug: Insulin NPH Isophane & Regular   30 Units, Every Evening      metoclopramide 10 MG tablet  Commonly known as: REGLAN   10 mg, Oral, Daily      omeprazole 40 MG capsule  Commonly known as: priLOSEC   40 mg, Daily      potassium chloride  10 MEQ CR tablet  Commonly known as: KLOR-CON M10   10 mEq, Daily      tiZANidine 4 MG tablet  Commonly known as: ZANAFLEX   4 mg, 3 Times Daily PRN      Vitamin D3 50 MCG capsule   1 capsule, Daily      warfarin 7.5 MG tablet  Commonly known as: COUMADIN   1 tablet every day             Stop These Medications      lisinopril 5 MG tablet  Commonly known as: PRINIVIL,ZESTRIL              No Known Allergies      Discharge Disposition:   Home or Self Care    Diet:  Hospital:  Diet Order   Procedures    Diet: Cardiac, Diabetic; Healthy Heart (2-3 Na+); Consistent Carbohydrate; Texture: Regular (IDDSI 7); Fluid Consistency: Thin (IDDSI 0)         Discharge Activity:   as tolerated       CODE STATUS:  Code Status and Medical Interventions: CPR (Attempt to Resuscitate); Full Support   Ordered at: 10/25/24 0523     Code Status (Patient has no pulse and is not breathing):    CPR (Attempt to Resuscitate)     Medical Interventions (Patient has pulse or is breathing):    Full Support         No future appointments.    Additional Instructions for the Follow-ups that You Need to Schedule       Ambulatory Referral to Home Health ([facility])   As directed      Face to Face Visit Date: 10/28/2024   Follow-up provider for Plan of Care?: I treated the patient in an acute care facility and will not continue treatment after discharge.   Follow-up provider: QUINTON SIMMONS [[id number]]   Reason/Clinical Findings: osteoarthritis   Describe mobility limitations that make leaving home difficult: osteoarthritis   Nursing/Therapeutic Services Requested: Physical Therapy   Frequency: Other                Time spent on Discharge including face to face service:  >35 minutes    Signature: Electronically signed by Jhonny Munguia MD, 10/28/24, 11:53 EDT.  [facility] Hospitalist Team

## 2024-10-28 NOTE — CASE MANAGEMENT/SOCIAL WORK
Continued Stay Note   [facility]     Patient Name: Grace Renee  MRN: [id number]  Today's Date: 10/28/2024    Admit Date: 10/25/2024    Plan: Return home with [facility]   Discharge Plan       Row Name 10/28/24 1123       Plan    Plan Return home with [facility]    Plan Comments [facility] liaison Stacy updated that patient is accepted with [facility] for start date of 10/31             Taisha Ocampo RN     [facility]  Phone # [phone]  Fax # [phone]

## 2024-10-28 NOTE — PLAN OF CARE
Goal Outcome Evaluation:      Pt's BP responded to medication.  Pt up ad anushka with stand by and uses walker.  Pt has been sitting up in chair.  NAD noted.  Pt will drive herself home.  She was here visiting family when her headache started.  Has been pain free.

## 2024-10-28 NOTE — PROGRESS NOTES
Cardiology Progress Note    Patient Identification:  Name: Grace Renee  Age: 74 y.o.  Sex: female  :  1950  MRN: [id number]                 Follow Up / Chief Complaint: Jaw pain, headache, uncontrolled hypertension  Chief Complaint   Patient presents with    Headache       Interval History: Patient presented to the emergency room with headache and jaw pain was found to have uncontrolled hypertension.  Patient underwent Lexiscan Cardiolite 10/26/2024 which was negative for ischemia EF of 61%      NP note: Patient seen and examined.  She is sitting up in chair no distress noted she denies any chest pain no further headaches blood pressure somewhat improved.  Continue valsartan, hydralazine, metoprolol and amlodipine    Okay to discharge follow-up in 1 to 2 weeks  Electronically signed by MARIA Estevez, 10/28/24, 4:45 PM EDT.    Cardiology attending addendum :    I have personally performed a face-to-face diagnostic evaluation, physical exam and reviewed data on this patient.  I have reviewed documentation done by me and nurse practitioner  and corrected as needed.  And agree with the different components of documentation.Greater than 50% of the time spent in the care of this patient was provided by attending consultant/me.         Subjective: Patient denies any chest pain today.       Objective:    10/26/2024: HS troponin is 33, glucose 315, white count elevated at 15.7.  10/27/2024: Glucose elevated, creatinine 1.26, white count elevated at 15  10/28/2024: BUN 31 creatinine 1.19 glucose 185 WBC 16.28 hemoglobin 11.7    History of present illness:      Ms. Grace Renee has a PMH of      - Hypertension   - Dyslipidemia  - diabetes   - Pulmonary emboli s/p mechanical thrombectomy 2021  - CONNOR   - GERD  - Arthritis   - Obesity, BMI 49   - no allergies         Presented to the ED with headache and jaw pain.  Patient reports that she was here in the hospital spending the night with her sister when  she woke up around 2 AM with severe headache as well as jaw pain and hip pain.  Patient was significantly hypertensive and placed on cardene drip.  She also notes that she has some broken teeth in her mouth and has had problems with gum swelling in the past, she thought that maybe she was getting an infection.  She reports that her headache and jaw pain have resolved and now she still has significant pain.   She denies any chest pain or shortness of breath.  She does note that on the way to the hospital to visit her sister that she had a hard time seeing her vision was blurry, which is now resolved.          Labs in the ED showed HS troponin 17, creatinine 1.11, WBC 12.8 sed rate 106 otherwise unremarkable.  EKG normal sinus rhythm. CT head negative            Assessment:  :     Hypertensive emergency  Elevated troponin at 17  Dyslipidemia  Diabetes, with A1c of 11.2  History of PE and anticoagulation, INR therapeutic at 2.38  Obesity with BMI over 49        Recommendations / Plan:         Patient presented with headache while visiting family in the hospital was found to have significantly elevated blood pressure.  Patient is currently admitted to PCU in telemetry  Patient was hypertensive requiring IV Cardene.  Now is off IV Cardene.  Patient had an echocardiogram 10/25/2024 which revealed EF of 61 to 65% with mild left atrial enlargement and mild aortic stenosis  Patient's troponin is mildly elevated at 17.  Patient is without chest pain.  EKG 10/25/2024 reviewed/interpreted by me reveals sinus rhythm with a rate of 72 bpm  Lexiscan Cardiolite 10/26/2024 was negative for ischemia EF of 61%  Patient would benefit from diabetes control  Patient's main problem is hip pain and elevated sed rate.  Blood pressure is improved.  Will defer to primary team to evaluate and treat leukocytosis and hip issues.  Blood pressure is better at 128/83 and 141/67.  Will follow as outpatient.         Copied text in this portion of the  "note has been reviewed and is accurate as of 10/28/2024    Past Medical History:  Past Medical History:   Diagnosis Date    Diabetes mellitus     Elevated cholesterol     GERD (gastroesophageal reflux disease)     Hypertension     Osteoarthritis of left hip 11/6/2021    Sleep apnea      Past Surgical History:  Past Surgical History:   Procedure Laterality Date    CARDIAC CATHETERIZATION Bilateral 11/2/2021    Procedure: Percutaneous Mechanical Thrombectomy- [facility];  Surgeon: Chris Pritchett MD;  Location:  [facility];  Service: Cardiovascular;  Laterality: Bilateral;    CARDIAC CATHETERIZATION N/A 11/2/2021    Procedure: Right Heart Cath;  Surgeon: Chris Pritchett MD;  Location:  [facility];  Service: Cardiovascular;  Laterality: N/A;    INTERVENTIONAL RADIOLOGY PROCEDURE Bilateral 11/2/2021    Procedure: Pulmonary Angiogram;  Surgeon: Chris Pritchett MD;  Location:  [facility];  Service: Cardiovascular;  Laterality: Bilateral;        Social History:   Social History     Tobacco Use    Smoking status: Never    Smokeless tobacco: Never   Substance Use Topics    Alcohol use: Not Currently      Family History:  History reviewed. No pertinent family history.       Allergies:  No Known Allergies  Scheduled Meds:          Review of Systems:   ROS        Constitutional:  Temp:  [97.3 °F (36.3 °C)-98.4 °F (36.9 °C)] 97.5 °F (36.4 °C)  Heart Rate:  [56-71] 69  Resp:  [13-16] 16  BP: (128-177)/(53-83) 141/67    Physical Exam   /67 (BP Location: Left arm, Patient Position: Sitting)   Pulse 69   Temp 97.5 °F (36.4 °C) (Oral)   Resp 16   Ht 167.6 cm (66\")   Wt 136 kg (300 lb)   SpO2 98%   BMI 48.42 kg/m²   General:  Appears in no acute distress  Eyes: Sclera is anicteric,  conjunctiva is clear   HEENT:  No JVD. Thyroid not visibly enlarged. No mucosal pallor or cyanosis  Respiratory: Respirations regular and unlabored at rest.  Clear to " auscultation  Cardiovascular: S1,S2 Regular rate and rhythm.    Gastrointestinal: Abdomen nondistended.  Musculoskeletal:  No abnormal movements  Extremities: No digital clubbing or cyanosis  Skin: Color pink.   Neuro: Alert and awake.    INTAKE AND OUTPUT:    Intake/Output Summary (Last 24 hours) at 10/28/2024 2000  Last data filed at 10/27/2024 2007  Gross per 24 hour   Intake 480 ml   Output --   Net 480 ml       Cardiographics  Telemetry: Sinus    ECG:   ECG 12 Lead Other; Headache   Final Result   HEART RATE=72  bpm   RR Vnovusno=190  ms   FL Xrjrwmcf=585  ms   P Horizontal Axis=-59  deg   P Front Axis=-77  deg   QRSD Interval=89  ms   QT Xyjuqice=869  ms   CNlN=353  ms   QRS Axis=23  deg   T Wave Axis=65  deg   - OTHERWISE NORMAL ECG -   Sinus or ectopic atrial rhythm   When compared with ECG of 08-Nov-2021 13:24:37,   Significant change in rhythm   Electronically Signed By: Kenan Gilbert ([facility]) 2024-10-25 06:45:36   Date and Time of Study:2024-10-25 03:34:34      Telemetry Scan   Final Result      Telemetry Scan   Final Result      Telemetry Scan   Final Result      Telemetry Scan   Final Result      Telemetry Scan   Final Result      Telemetry Scan   Final Result      Telemetry Scan   Final Result      Telemetry Scan   Final Result      Telemetry Scan   Final Result      Telemetry Scan   Final Result      Telemetry Scan   Final Result      Telemetry Scan   Final Result      Telemetry Scan   Final Result      Telemetry Scan   Final Result      Telemetry Scan   Final Result      Telemetry Scan   Final Result      Telemetry Scan   Final Result      Telemetry Scan   Final Result      Telemetry Scan   Final Result        I have personally reviewed EKG    Echocardiogram: Results for orders placed during the hospital encounter of 10/25/24    Adult Transthoracic Echo Complete W/ Cont if Necessary Per Protocol    Interpretation Summary    Left ventricular ejection fraction appears to be 61 - 65%.    Left ventricular  "diastolic function was normal.    The left atrial cavity is mildly dilated.    Mild aortic valve stenosis is present.      Lab Review   I have reviewed the labs  Results from last 7 days   Lab Units 10/26/24  0552 10/26/24  0144 10/25/24  0419   HSTROP T ng/L 33* 39* 17*         Results from last 7 days   Lab Units 10/28/24  0204   SODIUM mmol/L 138   POTASSIUM mmol/L 4.2   BUN mg/dL 31*   CREATININE mg/dL 1.49*   CALCIUM mg/dL 8.6         Results from last 7 days   Lab Units 10/28/24  0204 10/27/24  0309 10/26/24  0144   WBC 10*3/mm3 16.28* 15.48* 15.70*   HEMOGLOBIN g/dL 11.7* 11.9* 11.4*   HEMATOCRIT % 38.3 39.4 36.5   PLATELETS 10*3/mm3 273 236 259     Results from last 7 days   Lab Units 10/28/24  0204 10/27/24  1347 10/25/24  0419   INR  2.09 2.23 2.38       RADIOLOGY:  Imaging Results (Last 24 Hours)       ** No results found for the last 24 hours. **                  )10/28/2024  Michael Jiménez MD      EMR Dragon/Transcription:   \"Dictated utilizing Dragon dictation\".   "

## 2024-10-28 NOTE — PROGRESS NOTES
"Pharmacy dosing service  Anticoagulant  Warfarin     Subjective:    Grace Renee is a 74 y.o.female being continued on warfarin for History of DVT/PE.    INR Goal: 2 - 3  Home medication?: warfarin 7.5 mg PO daily  Bridge Therapy Present?:  No  Interacting Medications Evaluation (New/Present/Discontinued): n/a  Additional Contributing Factors: hip pain (severe pain/stress in general can increase INR)      Assessment/Plan:    INR is therapeutic again today, will continue home regimen.     Continue to monitor and adjust based on INR.         Date 10/25 10/26 10/27 10/28        INR 2.38  2.23 2.09        Dose -- 7.5 mg 7.5mg 7.5mg            Objective:  [Ht: 167.6 cm (66\"); Wt: 136 kg (300 lb); BMI: Body mass index is 48.42 kg/m².]    Lab Results   Component Value Date    ALBUMIN 3.7 08/21/2024     Lab Results   Component Value Date    INR 2.09 10/28/2024    INR 2.23 10/27/2024    INR 2.38 10/25/2024    PROTIME 21.6 10/28/2024    PROTIME 22.9 10/27/2024    PROTIME 24.3 10/25/2024     Lab Results   Component Value Date    HGB 11.7 (L) 10/28/2024    HGB 11.9 (L) 10/27/2024    HGB 11.4 (L) 10/26/2024     Lab Results   Component Value Date    HCT 38.3 10/28/2024    HCT 39.4 10/27/2024    HCT 36.5 10/26/2024       Allen Anguiano RPH  10/28/24 09:08 EDT   "

## 2024-10-29 ENCOUNTER — READMISSION MANAGEMENT (OUTPATIENT)
Dept: CALL CENTER | Facility: HOSPITAL | Age: 74
End: 2024-10-29
Payer: COMMERCIAL

## 2024-10-29 NOTE — OUTREACH NOTE
Prep Survey      Flowsheet Row Responses   [facility] patient discharged from? [facility]   Is LACE score < 7 ? No   Eligibility Readm Mgmt   Discharge diagnosis Hypertensive urgency   Does the patient have one of the following disease processes/diagnoses(primary or secondary)? Other   Does the patient have Home health ordered? Yes   What is the Home health agency?  [facility]   Is there a DME ordered? No   Prep survey completed? Yes            Melissa LOMBARDI - Registered Nurse

## 2024-10-30 LAB — BACTERIA SPEC AEROBE CULT: NORMAL

## 2024-10-31 ENCOUNTER — READMISSION MANAGEMENT (OUTPATIENT)
Dept: CALL CENTER | Facility: HOSPITAL | Age: 74
End: 2024-10-31
Payer: COMMERCIAL

## 2024-10-31 LAB — BACTERIA SPEC AEROBE CULT: NORMAL

## 2024-10-31 NOTE — OUTREACH NOTE
Medical Week 1 Survey      Flowsheet Row Responses   [facility] patient discharged from? [facility]   Does the patient have one of the following disease processes/diagnoses(primary or secondary)? Other   Week 1 attempt successful? No   Unsuccessful attempts Attempt 1  [pt declines as she was at a f/u appt]            ELI ROMO - Registered Nurse

## 2024-11-01 ENCOUNTER — HOME CARE VISIT (OUTPATIENT)
Dept: HOME HEALTH SERVICES | Facility: HOME HEALTHCARE | Age: 74
End: 2024-11-01
Payer: COMMERCIAL

## 2024-11-04 ENCOUNTER — READMISSION MANAGEMENT (OUTPATIENT)
Dept: CALL CENTER | Facility: HOSPITAL | Age: 74
End: 2024-11-04
Payer: COMMERCIAL

## 2024-11-04 NOTE — OUTREACH NOTE
Medical Week 1 Survey      Flowsheet Row Responses   [facility] facility patient discharged from? [facility]   Does the patient have one of the following disease processes/diagnoses(primary or secondary)? Other   Week 1 attempt successful? No   Unsuccessful attempts Attempt 2            Kadi LOMBARDI - Registered Nurse

## 2024-11-07 ENCOUNTER — READMISSION MANAGEMENT (OUTPATIENT)
Dept: CALL CENTER | Facility: HOSPITAL | Age: 74
End: 2024-11-07
Payer: COMMERCIAL

## 2024-11-07 NOTE — OUTREACH NOTE
Medical Week 1 Survey      Flowsheet Row Responses   [facility] facility patient discharged from? [facility]   Does the patient have one of the following disease processes/diagnoses(primary or secondary)? Other   Week 1 attempt successful? No   Unsuccessful attempts Attempt 3            Stacy GALICIA - Registered Nurse

## 2024-11-11 ENCOUNTER — OFFICE VISIT (OUTPATIENT)
Dept: CARDIOLOGY | Facility: CLINIC | Age: 74
End: 2024-11-11
Payer: COMMERCIAL

## 2024-11-11 VITALS
OXYGEN SATURATION: 97 % | HEART RATE: 88 BPM | DIASTOLIC BLOOD PRESSURE: 50 MMHG | WEIGHT: 293 LBS | HEIGHT: 66 IN | SYSTOLIC BLOOD PRESSURE: 152 MMHG | BODY MASS INDEX: 47.09 KG/M2

## 2024-11-11 DIAGNOSIS — Z79.4 TYPE 2 DIABETES MELLITUS WITH HYPERGLYCEMIA, WITH LONG-TERM CURRENT USE OF INSULIN: ICD-10-CM

## 2024-11-11 DIAGNOSIS — G47.33 OSA (OBSTRUCTIVE SLEEP APNEA): ICD-10-CM

## 2024-11-11 DIAGNOSIS — E78.5 DYSLIPIDEMIA: ICD-10-CM

## 2024-11-11 DIAGNOSIS — I10 UNCONTROLLED HYPERTENSION: Primary | ICD-10-CM

## 2024-11-11 DIAGNOSIS — E66.01 MORBID OBESITY WITH BMI OF 45.0-49.9, ADULT: ICD-10-CM

## 2024-11-11 DIAGNOSIS — E11.65 TYPE 2 DIABETES MELLITUS WITH HYPERGLYCEMIA, WITH LONG-TERM CURRENT USE OF INSULIN: ICD-10-CM

## 2024-11-11 PROCEDURE — 99214 OFFICE O/P EST MOD 30 MIN: CPT | Performed by: INTERNAL MEDICINE

## 2024-11-11 RX ORDER — CLONIDINE 0.3 MG/24H
1 PATCH, EXTENDED RELEASE TRANSDERMAL WEEKLY
Qty: 12 PATCH | Refills: 3 | Status: SHIPPED | OUTPATIENT
Start: 2024-11-11

## 2024-11-11 NOTE — PROGRESS NOTES
Subjective:     Encounter Date:11/11/2024      Patient ID: Grace Renee is a 74 y.o. female.    Chief Complaint and history of present illness:     Follow-up for hypertension, dyslipidemia, diabetes, PE              History of present illness:       Ms. Grace Renee has a PMH of      - Hypertension   - Dyslipidemia  - diabetes   - Pulmonary emboli s/p mechanical thrombectomy 11/2/2021  - CONNOR   - GERD  - Arthritis   - Obesity, BMI 49   - no allergies     Here for hospital follow-up.  Patient was recently visiting family members in hospital started having jaw pain and headache was noticed to have very high blood pressure.  Patient is on multiple medications and is having high blood pressure.    Patient's arterial blood pressure is 152/50, heart rate 88, O2 sat of 97% on room air.  BMI is over 50.        Presented to the ED with headache and jaw pain.  Patient reports that she was here in the hospital spending the night with her sister when she woke up around 2 AM with severe headache as well as jaw pain and hip pain.  Patient was significantly hypertensive and placed on cardene drip.  She also notes that she has some broken teeth in her mouth and has had problems with gum swelling in the past, she thought that maybe she was getting an infection.  She reports that her headache and jaw pain have resolved and now she still has significant pain.   She denies any chest pain or shortness of breath.  She does note that on the way to the hospital to visit her sister that she had a hard time seeing her vision was blurry, which is now resolved.          Labs in the ED showed HS troponin 17, creatinine 1.11, WBC 12.8 sed rate 106 otherwise unremarkable.  EKG normal sinus rhythm. CT head negative   Labs from 10/26/2024: HS troponin is 33, glucose 315, white count elevated at 15.7. labs from 10/27/2024: Glucose elevated, creatinine 1.26, white count elevated at 15 .  Labs from 10/28/2024: BUN 31 creatinine 1.19 glucose  185 WBC 16.28 hemoglobin 11.7        Assessment:  :     Uncontrolled hypertension  Elevated troponin at 17  Dyslipidemia  Diabetes, with A1c of 11.2  History of PE and anticoagulation, INR therapeutic at 2.38  Obesity with BMI over 50        Recommendations / Plan:        Will continue medical management with amlodipine, hydralazine, metoprolol, Diovan, furosemide, KCl, warfarin as tolerated.  Patient blood pressure still elevated will put her on clonidine TTS.  Will follow-up and titrate medications as needed for blood pressure control  Advised patient check blood pressure at home.  Reviewed BMI > 50, counseled on weight loss diet and exercise  Follow-up with PMD for diabetes control.  Patient previously had obstructive sleep apnea but did not tolerate CPAP and is not compliant with CPAP.  Needs new equipment.  Will send to sleep medicine to evaluate and help patient.       Review of records:    EKG 10/25/2024 reviewed/interpreted by me reveals sinus rhythm with a rate of 72 bpm    Procedures    Echocardiogram 10/25/2024 reveal normal LV systolic function EF of 61 to 65% with left atrial enlargement and mild aortic stenosis.    Lexiscan Cardiolite 10/26/2024 which was negative for ischemia EF of 61%     Copied text in this portion of the note has been reviewed and is accurate as of 11/12/2024  The following portions of the patient's history were reviewed and updated as appropriate: allergies, current medications, past family history, past medical history, past social history, past surgical history and problem list.    Assessment:         MDM       Diagnosis Plan   1. Uncontrolled hypertension        2. Dyslipidemia        3. Type 2 diabetes mellitus with hyperglycemia, with long-term current use of insulin        4. Morbid obesity with BMI of 45.0-49.9, adult        5. CONNOR (obstructive sleep apnea)  Ambulatory Referral to Sleep Medicine             Plan:               Past Medical History:  Past Medical History:    Diagnosis Date    Diabetes mellitus     Elevated cholesterol     GERD (gastroesophageal reflux disease)     Hypertension     Osteoarthritis of left hip 11/06/2021    PE (pulmonary thromboembolism)     Sleep apnea      Past Surgical History:  Past Surgical History:   Procedure Laterality Date    CARDIAC CATHETERIZATION Bilateral 11/02/2021    Procedure: Percutaneous Mechanical Thrombectomy- [facility];  Surgeon: Chris Pritchett MD;  Location:  [facility];  Service: Cardiovascular;  Laterality: Bilateral;    CARDIAC CATHETERIZATION N/A 11/02/2021    Procedure: Right Heart Cath;  Surgeon: Chris Pritchett MD;  Location:  [facility];  Service: Cardiovascular;  Laterality: N/A;    GALLBLADDER SURGERY      GASTRIC BANDING      INTERVENTIONAL RADIOLOGY PROCEDURE Bilateral 11/02/2021    Procedure: Pulmonary Angiogram;  Surgeon: Chris Pritchett MD;  Location:  [facility];  Service: Cardiovascular;  Laterality: Bilateral;      Allergies:  No Known Allergies  Home Meds:  Current Meds:     Current Outpatient Medications:     allopurinol (ZYLOPRIM) 100 MG tablet, Take 1 tablet by mouth Every Night., Disp: , Rfl:     amLODIPine (NORVASC) 10 MG tablet, Take 1 tablet by mouth Daily for 30 days., Disp: 30 tablet, Rfl: 0    atorvastatin (LIPITOR) 20 MG tablet, Take 1 tablet by mouth Daily., Disp: , Rfl:     calcium carbonate (TUMS) 500 MG chewable tablet, Chew 1 tablet 3 (Three) Times a Day As Needed for Indigestion or Heartburn., Disp: 90 tablet, Rfl: 3    Cholecalciferol (Vitamin D3) 50 MCG capsule, Take 1 capsule by mouth Daily., Disp: , Rfl:     furosemide (LASIX) 20 MG tablet, Take 1 tablet by mouth Daily., Disp: 30 tablet, Rfl: 3    gabapentin (NEURONTIN) 300 MG capsule, Take 1 capsule by mouth 3 (Three) Times a Day As Needed., Disp: , Rfl:     hydrALAZINE (APRESOLINE) 50 MG tablet, Take 1 tablet by mouth Every 8 (Eight) Hours for 30 days., Disp: 90 tablet, Rfl: 0     Insulin NPH Isophane & Regular (HumuLIN 70/30 KwikPen) (70-30) 100 UNIT/ML suspension pen-injector, Inject 50 Units under the skin into the appropriate area as directed Every Morning., Disp: , Rfl:     Insulin NPH Isophane & Regular (HumuLIN 70/30 KwikPen) (70-30) 100 UNIT/ML suspension pen-injector, Inject 30 Units under the skin into the appropriate area as directed Every Evening., Disp: , Rfl:     metoclopramide (REGLAN) 10 MG tablet, Take 1 tablet by mouth Daily., Disp: , Rfl:     metoprolol succinate XL (TOPROL-XL) 50 MG 24 hr tablet, Take 1 tablet by mouth Daily for 30 days., Disp: 30 tablet, Rfl: 0    omeprazole (priLOSEC) 40 MG capsule, Take 1 capsule by mouth Daily., Disp: , Rfl:     potassium chloride (KLOR-CON M10) 10 MEQ CR tablet, Take 1 tablet by mouth Daily., Disp: , Rfl:     tiZANidine (ZANAFLEX) 4 MG tablet, Take 1 tablet by mouth 3 (Three) Times a Day As Needed for Muscle Spasms., Disp: , Rfl:     valsartan (DIOVAN) 160 MG tablet, Take 1 tablet by mouth Daily for 30 days., Disp: 30 tablet, Rfl: 0    warfarin (COUMADIN) 7.5 MG tablet, 1 tablet every day  Indications: DVT/PE (active thrombosis), Disp: 30 tablet, Rfl: 3    cloNIDine (Catapres-TTS-3) 0.3 MG/24HR patch, Place 1 patch on the skin as directed by provider 1 (One) Time Per Week., Disp: 12 patch, Rfl: 3  Social History:   Social History     Tobacco Use    Smoking status: Never    Smokeless tobacco: Never   Substance Use Topics    Alcohol use: Not Currently      Family History:  Family History   Problem Relation Age of Onset    Heart disease Mother     Heart disease Father               Review of Systems   Constitutional: Negative for malaise/fatigue.   Cardiovascular:  Negative for chest pain, leg swelling and palpitations.   Respiratory:  Negative for shortness of breath.    Skin:  Negative for rash.   Neurological:  Negative for dizziness, light-headedness and numbness.     All other systems are negative         Objective:     Physical  "Exam  /50   Pulse 88   Ht 167.6 cm (66\")   Wt (!) 143 kg (315 lb)   SpO2 97%   BMI 50.84 kg/m²   General:  Appears in no acute distress  Eyes: Sclera is anicteric,  conjunctiva is clear   HEENT:  No JVD.  No carotid bruits  Respiratory: Respirations regular and unlabored at rest.  Clear to auscultation  Cardiovascular: S1,S2 Regular rate and rhythm. .   Extremities: No digital clubbing or cyanosis, no edema  Skin: Color pink. Skin warm and dry to touch. No rashes  No xanthoma  Neuro: Alert and awake.    Lab Reviewed:         Michael Jiménez MD  11/12/2024 17:27 EST      EMR Dragon/Transcription:   \"Dictated utilizing Dragon dictation\".        "

## 2024-11-14 ENCOUNTER — OFFICE (OUTPATIENT)
Age: 74
End: 2024-11-14

## 2024-11-14 ENCOUNTER — OFFICE (OUTPATIENT)
Dept: URBAN - METROPOLITAN AREA CLINIC 64 | Facility: CLINIC | Age: 74
End: 2024-11-14

## (undated) DEVICE — INTRO SHEATH DRYSEAL FLEX 24F 8.0TO8.8MM 33CM

## (undated) DEVICE — DIL VESL STD .038 10F 20CM

## (undated) DEVICE — GW AMPLTZ SUPERSTIFF SHT/TPR STR .035IN 260CM

## (undated) DEVICE — Device

## (undated) DEVICE — CAP FLOW TRIEVER INARI MEDICAL

## (undated) DEVICE — DIL VESL 14F.038 20CM

## (undated) DEVICE — SYS PANNUS RETENT LF

## (undated) DEVICE — CATH PULM WEDGE PRESS 7F

## (undated) DEVICE — PK CATH CARD 40

## (undated) DEVICE — TRIEVER24 CATHETER, 24 FR, 90 CM, GEN4: Brand: TRIEVER 24

## (undated) DEVICE — DIL VESL 12F.038 20CM

## (undated) DEVICE — PERCLOSE PROGLIDE™ SUTURE-MEDIATED CLOSURE SYSTEM: Brand: PERCLOSE PROGLIDE™

## (undated) DEVICE — INTRO SHEATH ART/FEM ENGAGE .035 8F12CM

## (undated) DEVICE — CATH PULM GPC PE 4SD/PRT 6.7F 100CM

## (undated) DEVICE — FLORTRIEVER 2 CATHETER, MEDIUM, 12FR, 120 CM: Brand: FLOWTRIEVER 2 CATHETER, M

## (undated) DEVICE — HI-TORQUE SUPRA CORE .035 PERIPHERAL GUIDE WIRE .035 X 190 CM: Brand: HI-TORQUE SUPRA CORE

## (undated) DEVICE — DIL VESL 16F .038 20CM

## (undated) DEVICE — FLOWSAVER: Brand: FLOWSAVER

## (undated) DEVICE — GW EMR FIX EXCHG J STD .035 3MM 260CM

## (undated) DEVICE — CATH DIAG IMPULSE FR4 5F 100CM

## (undated) DEVICE — KT MANIFLD CARDIAC